# Patient Record
Sex: FEMALE | Race: WHITE | Employment: FULL TIME | URBAN - METROPOLITAN AREA
[De-identification: names, ages, dates, MRNs, and addresses within clinical notes are randomized per-mention and may not be internally consistent; named-entity substitution may affect disease eponyms.]

---

## 2017-02-20 PROBLEM — K58.0 IRRITABLE BOWEL SYNDROME WITH DIARRHEA: Status: ACTIVE | Noted: 2017-02-20

## 2018-04-24 ENCOUNTER — OFFICE VISIT (OUTPATIENT)
Dept: FAMILY MEDICINE CLINIC | Age: 43
End: 2018-04-24
Payer: COMMERCIAL

## 2018-04-24 VITALS
DIASTOLIC BLOOD PRESSURE: 80 MMHG | BODY MASS INDEX: 39.39 KG/M2 | HEIGHT: 67 IN | HEART RATE: 88 BPM | OXYGEN SATURATION: 94 % | WEIGHT: 251 LBS | RESPIRATION RATE: 18 BRPM | SYSTOLIC BLOOD PRESSURE: 122 MMHG

## 2018-04-24 DIAGNOSIS — H69.83 DYSFUNCTION OF BOTH EUSTACHIAN TUBES: ICD-10-CM

## 2018-04-24 DIAGNOSIS — J06.9 VIRAL URI: ICD-10-CM

## 2018-04-24 PROBLEM — H69.93 DYSFUNCTION OF BOTH EUSTACHIAN TUBES: Status: ACTIVE | Noted: 2018-04-24

## 2018-04-24 PROCEDURE — 99213 OFFICE O/P EST LOW 20 MIN: CPT | Performed by: FAMILY MEDICINE

## 2018-04-24 RX ORDER — PREDNISONE 20 MG/1
40 TABLET ORAL DAILY
Qty: 10 TABLET | Refills: 0 | Status: SHIPPED | OUTPATIENT
Start: 2018-04-24 | End: 2018-04-29

## 2018-04-24 ASSESSMENT — ENCOUNTER SYMPTOMS
DIARRHEA: 0
EYE ITCHING: 0
WHEEZING: 0
NAUSEA: 0
CHEST TIGHTNESS: 0
VOMITING: 0
CONSTIPATION: 0
EYE PAIN: 0
COLOR CHANGE: 0
APNEA: 0
BLOOD IN STOOL: 0
BACK PAIN: 0
ABDOMINAL PAIN: 0
SHORTNESS OF BREATH: 0
EYE REDNESS: 0
COUGH: 0
VISUAL CHANGE: 0
SORE THROAT: 0
SINUS PRESSURE: 0
RHINORRHEA: 0

## 2018-04-24 ASSESSMENT — PATIENT HEALTH QUESTIONNAIRE - PHQ9
SUM OF ALL RESPONSES TO PHQ9 QUESTIONS 1 & 2: 0
SUM OF ALL RESPONSES TO PHQ QUESTIONS 1-9: 0
1. LITTLE INTEREST OR PLEASURE IN DOING THINGS: 0
2. FEELING DOWN, DEPRESSED OR HOPELESS: 0

## 2018-06-01 ENCOUNTER — OFFICE VISIT (OUTPATIENT)
Dept: FAMILY MEDICINE CLINIC | Age: 43
End: 2018-06-01
Payer: COMMERCIAL

## 2018-06-01 ENCOUNTER — HOSPITAL ENCOUNTER (OUTPATIENT)
Age: 43
Discharge: HOME | End: 2018-06-03
Payer: COMMERCIAL

## 2018-06-01 VITALS
WEIGHT: 250 LBS | DIASTOLIC BLOOD PRESSURE: 70 MMHG | OXYGEN SATURATION: 97 % | HEIGHT: 67 IN | HEART RATE: 89 BPM | RESPIRATION RATE: 18 BRPM | SYSTOLIC BLOOD PRESSURE: 110 MMHG | BODY MASS INDEX: 39.24 KG/M2

## 2018-06-01 DIAGNOSIS — N93.8 DUB (DYSFUNCTIONAL UTERINE BLEEDING): ICD-10-CM

## 2018-06-01 DIAGNOSIS — E78.49 FAMILIAL HYPERLIPIDEMIA: Chronic | ICD-10-CM

## 2018-06-01 DIAGNOSIS — E11.9 TYPE 2 DIABETES MELLITUS WITHOUT COMPLICATION, WITHOUT LONG-TERM CURRENT USE OF INSULIN (HCC): ICD-10-CM

## 2018-06-01 DIAGNOSIS — H69.83 DYSFUNCTION OF BOTH EUSTACHIAN TUBES: ICD-10-CM

## 2018-06-01 DIAGNOSIS — E11.9 TYPE 2 DIABETES MELLITUS WITHOUT COMPLICATION, WITHOUT LONG-TERM CURRENT USE OF INSULIN (HCC): Primary | ICD-10-CM

## 2018-06-01 DIAGNOSIS — I10 ESSENTIAL HYPERTENSION: ICD-10-CM

## 2018-06-01 PROBLEM — J06.9 VIRAL URI: Status: RESOLVED | Noted: 2018-04-24 | Resolved: 2018-06-01

## 2018-06-01 LAB
ALBUMIN SERPL-MCNC: 4.2 G/DL (ref 3.5–5.2)
ALP BLD-CCNC: 76 U/L (ref 35–104)
ALT SERPL-CCNC: 47 U/L (ref 0–32)
ANION GAP SERPL CALCULATED.3IONS-SCNC: 11 MMOL/L (ref 7–16)
AST SERPL-CCNC: 25 U/L (ref 0–31)
BILIRUB SERPL-MCNC: 0.2 MG/DL (ref 0–1.2)
BUN BLDV-MCNC: 11 MG/DL (ref 6–20)
CALCIUM SERPL-MCNC: 9.1 MG/DL (ref 8.6–10.2)
CHLORIDE BLD-SCNC: 99 MMOL/L (ref 98–107)
CHOLESTEROL, TOTAL: 156 MG/DL (ref 0–199)
CO2: 28 MMOL/L (ref 22–29)
CREAT SERPL-MCNC: 0.7 MG/DL (ref 0.5–1)
CREATININE URINE: 144 MG/DL (ref 29–226)
FERRITIN: 19 NG/ML
FOLLICLE STIMULATING HORMONE: 6.2 MIU/ML
GFR AFRICAN AMERICAN: >60
GFR NON-AFRICAN AMERICAN: >60 ML/MIN/1.73
GLUCOSE BLD-MCNC: 167 MG/DL (ref 74–109)
HBA1C MFR BLD: 7.7 % (ref 4.8–5.9)
HCT VFR BLD CALC: 40.1 % (ref 34–48)
HDLC SERPL-MCNC: 38 MG/DL
HEMOGLOBIN: 12.3 G/DL (ref 11.5–15.5)
IRON SATURATION: 14 % (ref 15–50)
IRON: 51 MCG/DL (ref 37–145)
LDL CHOLESTEROL CALCULATED: 93 MG/DL (ref 0–99)
LUTEINIZING HORMONE: 5.1 MIU/ML
MCH RBC QN AUTO: 27.6 PG (ref 26–35)
MCHC RBC AUTO-ENTMCNC: 30.7 % (ref 32–34.5)
MCV RBC AUTO: 89.9 FL (ref 80–99.9)
MICROALBUMIN UR-MCNC: 32.1 MG/L
MICROALBUMIN/CREAT UR-RTO: 22.3 (ref 0–30)
PDW BLD-RTO: 14.1 FL (ref 11.5–15)
PLATELET # BLD: 266 E9/L (ref 130–450)
PMV BLD AUTO: 14.4 FL (ref 7–12)
POTASSIUM SERPL-SCNC: 4.7 MMOL/L (ref 3.5–5)
RBC # BLD: 4.46 E12/L (ref 3.5–5.5)
SODIUM BLD-SCNC: 138 MMOL/L (ref 132–146)
TOTAL IRON BINDING CAPACITY: 365 MCG/DL (ref 250–450)
TOTAL PROTEIN: 7.3 G/DL (ref 6.4–8.3)
TRIGL SERPL-MCNC: 127 MG/DL (ref 0–149)
TSH SERPL DL<=0.05 MIU/L-ACNC: 0.77 UIU/ML (ref 0.27–4.2)
VLDLC SERPL CALC-MCNC: 25 MG/DL
WBC # BLD: 6 E9/L (ref 4.5–11.5)

## 2018-06-01 PROCEDURE — 82570 ASSAY OF URINE CREATININE: CPT

## 2018-06-01 PROCEDURE — 80061 LIPID PANEL: CPT

## 2018-06-01 PROCEDURE — 83001 ASSAY OF GONADOTROPIN (FSH): CPT

## 2018-06-01 PROCEDURE — 83002 ASSAY OF GONADOTROPIN (LH): CPT

## 2018-06-01 PROCEDURE — 83550 IRON BINDING TEST: CPT

## 2018-06-01 PROCEDURE — 36415 COLL VENOUS BLD VENIPUNCTURE: CPT | Performed by: FAMILY MEDICINE

## 2018-06-01 PROCEDURE — 83540 ASSAY OF IRON: CPT

## 2018-06-01 PROCEDURE — 85027 COMPLETE CBC AUTOMATED: CPT

## 2018-06-01 PROCEDURE — 82728 ASSAY OF FERRITIN: CPT

## 2018-06-01 PROCEDURE — 83036 HEMOGLOBIN GLYCOSYLATED A1C: CPT

## 2018-06-01 PROCEDURE — 82044 UR ALBUMIN SEMIQUANTITATIVE: CPT

## 2018-06-01 PROCEDURE — 84443 ASSAY THYROID STIM HORMONE: CPT

## 2018-06-01 PROCEDURE — 80053 COMPREHEN METABOLIC PANEL: CPT

## 2018-06-01 RX ORDER — METFORMIN HYDROCHLORIDE 500 MG/1
500 TABLET, EXTENDED RELEASE ORAL
Qty: 30 TABLET | Refills: 3 | Status: SHIPPED | OUTPATIENT
Start: 2018-06-01 | End: 2018-06-04

## 2018-06-01 ASSESSMENT — ENCOUNTER SYMPTOMS
NAUSEA: 0
ABDOMINAL PAIN: 0
BLOOD IN STOOL: 0
EYE ITCHING: 0
APNEA: 0
COLOR CHANGE: 0
RHINORRHEA: 1
BACK PAIN: 0
EYE REDNESS: 0
COUGH: 0
SORE THROAT: 0
SINUS PRESSURE: 0
CONSTIPATION: 0
VOMITING: 0
CHEST TIGHTNESS: 0
SHORTNESS OF BREATH: 0
EYE PAIN: 0
WHEEZING: 0
DIARRHEA: 0

## 2018-06-04 ENCOUNTER — TELEPHONE (OUTPATIENT)
Dept: FAMILY MEDICINE CLINIC | Age: 43
End: 2018-06-04

## 2018-06-04 RX ORDER — METFORMIN HYDROCHLORIDE 500 MG/1
1000 TABLET, EXTENDED RELEASE ORAL
Qty: 60 TABLET | Refills: 5 | Status: SHIPPED | OUTPATIENT
Start: 2018-06-04 | End: 2018-06-18 | Stop reason: SDUPTHER

## 2018-06-04 RX ORDER — ROSUVASTATIN CALCIUM 5 MG/1
5 TABLET, COATED ORAL NIGHTLY
Qty: 30 TABLET | Refills: 5 | Status: SHIPPED | OUTPATIENT
Start: 2018-06-04 | End: 2018-08-29 | Stop reason: SDUPTHER

## 2018-06-18 RX ORDER — METFORMIN HYDROCHLORIDE 500 MG/1
1000 TABLET, EXTENDED RELEASE ORAL
Qty: 60 TABLET | Refills: 5 | Status: SHIPPED | OUTPATIENT
Start: 2018-06-18 | End: 2018-06-20 | Stop reason: DRUGHIGH

## 2018-06-20 RX ORDER — METFORMIN HYDROCHLORIDE 500 MG/1
1000 TABLET, EXTENDED RELEASE ORAL
COMMUNITY
End: 2018-06-20 | Stop reason: SDUPTHER

## 2018-06-20 RX ORDER — METFORMIN HYDROCHLORIDE 500 MG/1
1000 TABLET, EXTENDED RELEASE ORAL
Qty: 60 TABLET | Refills: 5 | Status: SHIPPED | OUTPATIENT
Start: 2018-06-20 | End: 2018-08-13 | Stop reason: SDUPTHER

## 2018-06-25 ENCOUNTER — HOSPITAL ENCOUNTER (OUTPATIENT)
Age: 43
Discharge: HOME | End: 2018-06-27
Payer: COMMERCIAL

## 2018-06-25 PROCEDURE — 88305 TISSUE EXAM BY PATHOLOGIST: CPT

## 2018-07-03 RX ORDER — VENLAFAXINE HYDROCHLORIDE 150 MG/1
150 CAPSULE, EXTENDED RELEASE ORAL DAILY
Qty: 90 CAPSULE | Refills: 3 | Status: SHIPPED | OUTPATIENT
Start: 2018-07-03 | End: 2018-11-09 | Stop reason: SDUPTHER

## 2018-08-13 RX ORDER — METFORMIN HYDROCHLORIDE 500 MG/1
1000 TABLET, EXTENDED RELEASE ORAL
Qty: 180 TABLET | Refills: 2 | Status: SHIPPED | OUTPATIENT
Start: 2018-08-13 | End: 2019-04-14 | Stop reason: SDUPTHER

## 2018-08-29 RX ORDER — ROSUVASTATIN CALCIUM 5 MG/1
5 TABLET, COATED ORAL NIGHTLY
Qty: 90 TABLET | Refills: 3 | Status: SHIPPED | OUTPATIENT
Start: 2018-08-29 | End: 2019-07-17 | Stop reason: SDUPTHER

## 2018-09-05 RX ORDER — GLIMEPIRIDE 2 MG/1
2 TABLET ORAL EVERY MORNING
Qty: 90 TABLET | Refills: 3 | Status: SHIPPED | OUTPATIENT
Start: 2018-09-05 | End: 2018-09-06 | Stop reason: SDUPTHER

## 2018-09-06 RX ORDER — GLIMEPIRIDE 2 MG/1
2 TABLET ORAL EVERY MORNING
Qty: 90 TABLET | Refills: 3 | Status: SHIPPED | OUTPATIENT
Start: 2018-09-06 | End: 2018-11-09 | Stop reason: SDUPTHER

## 2018-09-13 ENCOUNTER — HOSPITAL ENCOUNTER (OUTPATIENT)
Age: 43
Discharge: HOME | End: 2018-09-15
Payer: COMMERCIAL

## 2018-09-13 PROCEDURE — 88305 TISSUE EXAM BY PATHOLOGIST: CPT

## 2018-10-24 ENCOUNTER — HOSPITAL ENCOUNTER (OUTPATIENT)
Age: 43
Discharge: HOME | End: 2018-10-26

## 2018-10-24 PROCEDURE — 88305 TISSUE EXAM BY PATHOLOGIST: CPT

## 2018-11-09 ENCOUNTER — OFFICE VISIT (OUTPATIENT)
Dept: FAMILY MEDICINE CLINIC | Age: 43
End: 2018-11-09
Payer: COMMERCIAL

## 2018-11-09 VITALS
BODY MASS INDEX: 36.1 KG/M2 | WEIGHT: 230 LBS | HEIGHT: 67 IN | SYSTOLIC BLOOD PRESSURE: 130 MMHG | DIASTOLIC BLOOD PRESSURE: 78 MMHG | HEART RATE: 92 BPM | OXYGEN SATURATION: 98 %

## 2018-11-09 DIAGNOSIS — I10 ESSENTIAL HYPERTENSION: Primary | ICD-10-CM

## 2018-11-09 DIAGNOSIS — E11.9 TYPE 2 DIABETES MELLITUS WITHOUT COMPLICATION, WITHOUT LONG-TERM CURRENT USE OF INSULIN (HCC): ICD-10-CM

## 2018-11-09 LAB
DIABETIC RETINOPATHY: NEGATIVE
HBA1C MFR BLD: 7.8 %

## 2018-11-09 PROCEDURE — 99214 OFFICE O/P EST MOD 30 MIN: CPT | Performed by: FAMILY MEDICINE

## 2018-11-09 PROCEDURE — 83036 HEMOGLOBIN GLYCOSYLATED A1C: CPT | Performed by: FAMILY MEDICINE

## 2018-11-09 RX ORDER — VENLAFAXINE HYDROCHLORIDE 150 MG/1
150 CAPSULE, EXTENDED RELEASE ORAL DAILY
Qty: 90 CAPSULE | Refills: 3 | Status: SHIPPED | OUTPATIENT
Start: 2018-11-09 | End: 2020-02-03 | Stop reason: SDUPTHER

## 2018-11-09 RX ORDER — GLIMEPIRIDE 4 MG/1
4 TABLET ORAL EVERY MORNING
Qty: 90 TABLET | Refills: 3 | Status: SHIPPED | OUTPATIENT
Start: 2018-11-09 | End: 2019-04-15 | Stop reason: SDUPTHER

## 2018-11-09 RX ORDER — LIRAGLUTIDE 6 MG/ML
1.8 INJECTION SUBCUTANEOUS DAILY
Qty: 27 ML | Refills: 3 | Status: SHIPPED | OUTPATIENT
Start: 2018-11-09 | End: 2019-04-15 | Stop reason: SDUPTHER

## 2018-11-09 ASSESSMENT — ENCOUNTER SYMPTOMS
DIARRHEA: 0
COLOR CHANGE: 0
EYE ITCHING: 0
CHEST TIGHTNESS: 0
SINUS PRESSURE: 0
SHORTNESS OF BREATH: 0
BACK PAIN: 0
EYE PAIN: 0
VOMITING: 0
BLOOD IN STOOL: 0
APNEA: 0
WHEEZING: 0
COUGH: 0
NAUSEA: 0
CONSTIPATION: 0
SORE THROAT: 0
ABDOMINAL PAIN: 0
EYE REDNESS: 0
RHINORRHEA: 1

## 2018-11-09 ASSESSMENT — PATIENT HEALTH QUESTIONNAIRE - PHQ9
SUM OF ALL RESPONSES TO PHQ QUESTIONS 1-9: 0
2. FEELING DOWN, DEPRESSED OR HOPELESS: 0
SUM OF ALL RESPONSES TO PHQ9 QUESTIONS 1 & 2: 0
1. LITTLE INTEREST OR PLEASURE IN DOING THINGS: 0
SUM OF ALL RESPONSES TO PHQ QUESTIONS 1-9: 0

## 2018-11-09 NOTE — PROGRESS NOTES
Negative for activity change, appetite change, fatigue and fever. HENT: Positive for hearing loss and rhinorrhea. Negative for congestion, ear pain, nosebleeds, sinus pressure and sore throat. Eyes: Negative for pain, redness, itching and visual disturbance. Respiratory: Negative for apnea, cough, chest tightness, shortness of breath and wheezing. Cardiovascular: Negative for chest pain, palpitations and leg swelling. Gastrointestinal: Negative for abdominal pain, blood in stool, constipation, diarrhea, nausea and vomiting. Endocrine: Negative. Genitourinary: Negative for decreased urine volume, difficulty urinating, dysuria, frequency, hematuria and urgency. Musculoskeletal: Negative for arthralgias, back pain, gait problem, myalgias and neck pain. Skin: Negative for color change and rash. Allergic/Immunologic: Negative for environmental allergies and food allergies. Neurological: Negative for dizziness, weakness, light-headedness, numbness and headaches. Hematological: Negative for adenopathy. Does not bruise/bleed easily. Psychiatric/Behavioral: Negative for behavioral problems, dysphoric mood and sleep disturbance. The patient is not nervous/anxious and is not hyperactive. All other systems reviewed and are negative. /78 (Site: Right Upper Arm, Position: Sitting)   Pulse 92   Ht 5' 7\" (1.702 m)   Wt 230 lb (104.3 kg)   SpO2 98%   BMI 36.02 kg/m²     Physical Exam   Constitutional: She is oriented to person, place, and time. She appears well-developed and well-nourished. HENT:   Head: Normocephalic and atraumatic. Right Ear: External ear normal.   Left Ear: External ear normal.   Nose: Nose normal.   Mouth/Throat: Oropharynx is clear and moist.   Eyes: Pupils are equal, round, and reactive to light. Conjunctivae and EOM are normal. No scleral icterus. Neck: Normal range of motion. Neck supple. No thyromegaly present.    Cardiovascular: Normal rate, regular

## 2018-11-13 RX ORDER — LISINOPRIL 20 MG/1
20 TABLET ORAL DAILY
Qty: 90 TABLET | Refills: 3 | Status: SHIPPED | OUTPATIENT
Start: 2018-11-13 | End: 2019-07-17 | Stop reason: SDUPTHER

## 2018-12-19 RX ORDER — DEXLANSOPRAZOLE 60 MG/1
60 CAPSULE, DELAYED RELEASE ORAL DAILY
Qty: 90 CAPSULE | Refills: 3 | Status: SHIPPED | OUTPATIENT
Start: 2018-12-19 | End: 2019-01-17 | Stop reason: SDUPTHER

## 2018-12-19 RX ORDER — DEXLANSOPRAZOLE 60 MG/1
60 CAPSULE, DELAYED RELEASE ORAL DAILY
Qty: 10 CAPSULE | Refills: 0 | Status: SHIPPED | OUTPATIENT
Start: 2018-12-19 | End: 2019-07-17 | Stop reason: SDUPTHER

## 2019-01-15 ENCOUNTER — PATIENT MESSAGE (OUTPATIENT)
Dept: FAMILY MEDICINE CLINIC | Age: 44
End: 2019-01-15

## 2019-01-17 ENCOUNTER — OFFICE VISIT (OUTPATIENT)
Dept: FAMILY MEDICINE CLINIC | Age: 44
End: 2019-01-17
Payer: COMMERCIAL

## 2019-01-17 ENCOUNTER — HOSPITAL ENCOUNTER (OUTPATIENT)
Age: 44
Discharge: HOME | End: 2019-01-19
Payer: COMMERCIAL

## 2019-01-17 VITALS
DIASTOLIC BLOOD PRESSURE: 88 MMHG | HEIGHT: 67 IN | SYSTOLIC BLOOD PRESSURE: 128 MMHG | RESPIRATION RATE: 16 BRPM | OXYGEN SATURATION: 98 % | BODY MASS INDEX: 38.3 KG/M2 | WEIGHT: 244 LBS | HEART RATE: 102 BPM

## 2019-01-17 DIAGNOSIS — E11.9 TYPE 2 DIABETES MELLITUS WITHOUT COMPLICATION, WITHOUT LONG-TERM CURRENT USE OF INSULIN (HCC): Primary | ICD-10-CM

## 2019-01-17 DIAGNOSIS — E11.9 TYPE 2 DIABETES MELLITUS WITHOUT COMPLICATION, WITHOUT LONG-TERM CURRENT USE OF INSULIN (HCC): ICD-10-CM

## 2019-01-17 DIAGNOSIS — J06.9 VIRAL URI: ICD-10-CM

## 2019-01-17 LAB
ALBUMIN SERPL-MCNC: 4.3 G/DL (ref 3.5–5.2)
ALP BLD-CCNC: 80 U/L (ref 35–104)
ALT SERPL-CCNC: 35 U/L (ref 0–32)
ANION GAP SERPL CALCULATED.3IONS-SCNC: 17 MMOL/L (ref 7–16)
AST SERPL-CCNC: 23 U/L (ref 0–31)
BILIRUB SERPL-MCNC: 0.3 MG/DL (ref 0–1.2)
BUN BLDV-MCNC: 12 MG/DL (ref 6–20)
CALCIUM SERPL-MCNC: 9.3 MG/DL (ref 8.6–10.2)
CHLORIDE BLD-SCNC: 99 MMOL/L (ref 98–107)
CHOLESTEROL, TOTAL: 138 MG/DL (ref 0–199)
CO2: 24 MMOL/L (ref 22–29)
CREAT SERPL-MCNC: 0.7 MG/DL (ref 0.5–1)
GFR AFRICAN AMERICAN: >60
GFR NON-AFRICAN AMERICAN: >60 ML/MIN/1.73
GLUCOSE BLD-MCNC: 220 MG/DL (ref 74–99)
HBA1C MFR BLD: 8.7 % (ref 4–5.6)
HCT VFR BLD CALC: 40 % (ref 34–48)
HDLC SERPL-MCNC: 36 MG/DL
HEMOGLOBIN: 12.3 G/DL (ref 11.5–15.5)
LDL CHOLESTEROL CALCULATED: 62 MG/DL (ref 0–99)
MCH RBC QN AUTO: 26.3 PG (ref 26–35)
MCHC RBC AUTO-ENTMCNC: 30.8 % (ref 32–34.5)
MCV RBC AUTO: 85.7 FL (ref 80–99.9)
PDW BLD-RTO: 15.3 FL (ref 11.5–15)
PLATELET # BLD: 295 E9/L (ref 130–450)
PMV BLD AUTO: 15 FL (ref 7–12)
POTASSIUM SERPL-SCNC: 4.3 MMOL/L (ref 3.5–5)
RBC # BLD: 4.67 E12/L (ref 3.5–5.5)
SODIUM BLD-SCNC: 140 MMOL/L (ref 132–146)
TOTAL PROTEIN: 7.8 G/DL (ref 6.4–8.3)
TRIGL SERPL-MCNC: 199 MG/DL (ref 0–149)
TSH SERPL DL<=0.05 MIU/L-ACNC: 0.71 UIU/ML (ref 0.27–4.2)
VLDLC SERPL CALC-MCNC: 40 MG/DL
WBC # BLD: 7.7 E9/L (ref 4.5–11.5)

## 2019-01-17 PROCEDURE — 80061 LIPID PANEL: CPT

## 2019-01-17 PROCEDURE — 99214 OFFICE O/P EST MOD 30 MIN: CPT | Performed by: FAMILY MEDICINE

## 2019-01-17 PROCEDURE — 84443 ASSAY THYROID STIM HORMONE: CPT

## 2019-01-17 PROCEDURE — 85027 COMPLETE CBC AUTOMATED: CPT

## 2019-01-17 PROCEDURE — 80053 COMPREHEN METABOLIC PANEL: CPT

## 2019-01-17 PROCEDURE — 83036 HEMOGLOBIN GLYCOSYLATED A1C: CPT

## 2019-01-17 RX ORDER — CETIRIZINE HYDROCHLORIDE 10 MG/1
10 TABLET ORAL DAILY
Qty: 30 TABLET | Refills: 0 | Status: SHIPPED | OUTPATIENT
Start: 2019-01-17 | End: 2019-02-14 | Stop reason: SDUPTHER

## 2019-01-17 RX ORDER — BENZONATATE 200 MG/1
200 CAPSULE ORAL 3 TIMES DAILY PRN
Qty: 30 CAPSULE | Refills: 0 | Status: SHIPPED | OUTPATIENT
Start: 2019-01-17 | End: 2019-02-20 | Stop reason: SDUPTHER

## 2019-01-17 ASSESSMENT — ENCOUNTER SYMPTOMS
SORE THROAT: 0
COUGH: 1
SINUS PRESSURE: 0
CONSTIPATION: 0
VOMITING: 0
CHEST TIGHTNESS: 0
APNEA: 0
WHEEZING: 0
COLOR CHANGE: 0
BACK PAIN: 0
BLOOD IN STOOL: 0
RHINORRHEA: 0
NAUSEA: 0
DIARRHEA: 0
EYE PAIN: 0
EYE ITCHING: 0
ABDOMINAL PAIN: 0
SHORTNESS OF BREATH: 0
EYE REDNESS: 0

## 2019-01-17 ASSESSMENT — PATIENT HEALTH QUESTIONNAIRE - PHQ9
SUM OF ALL RESPONSES TO PHQ9 QUESTIONS 1 & 2: 0
2. FEELING DOWN, DEPRESSED OR HOPELESS: 0
SUM OF ALL RESPONSES TO PHQ QUESTIONS 1-9: 0
1. LITTLE INTEREST OR PLEASURE IN DOING THINGS: 0
SUM OF ALL RESPONSES TO PHQ QUESTIONS 1-9: 0

## 2019-01-21 ENCOUNTER — TELEPHONE (OUTPATIENT)
Dept: FAMILY MEDICINE CLINIC | Age: 44
End: 2019-01-21

## 2019-01-21 DIAGNOSIS — E11.9 TYPE 2 DIABETES MELLITUS WITHOUT COMPLICATION, WITHOUT LONG-TERM CURRENT USE OF INSULIN (HCC): Primary | ICD-10-CM

## 2019-02-14 RX ORDER — CETIRIZINE HYDROCHLORIDE 10 MG/1
TABLET ORAL
Qty: 30 TABLET | Refills: 5 | Status: SHIPPED | OUTPATIENT
Start: 2019-02-14 | End: 2019-03-20 | Stop reason: SDUPTHER

## 2019-03-04 ENCOUNTER — OFFICE VISIT (OUTPATIENT)
Dept: ENDOCRINOLOGY | Age: 44
End: 2019-03-04
Payer: COMMERCIAL

## 2019-03-04 VITALS
DIASTOLIC BLOOD PRESSURE: 82 MMHG | SYSTOLIC BLOOD PRESSURE: 122 MMHG | BODY MASS INDEX: 38.77 KG/M2 | OXYGEN SATURATION: 99 % | HEIGHT: 67 IN | WEIGHT: 247 LBS | HEART RATE: 72 BPM | RESPIRATION RATE: 18 BRPM

## 2019-03-04 DIAGNOSIS — E66.9 OBESITY WITHOUT SERIOUS COMORBIDITY, UNSPECIFIED CLASSIFICATION, UNSPECIFIED OBESITY TYPE: ICD-10-CM

## 2019-03-04 DIAGNOSIS — E55.9 VITAMIN D DEFICIENCY: ICD-10-CM

## 2019-03-04 DIAGNOSIS — E11.9 TYPE 2 DIABETES MELLITUS WITHOUT COMPLICATION, WITHOUT LONG-TERM CURRENT USE OF INSULIN (HCC): Primary | ICD-10-CM

## 2019-03-04 PROCEDURE — 99204 OFFICE O/P NEW MOD 45 MIN: CPT | Performed by: INTERNAL MEDICINE

## 2019-03-13 ENCOUNTER — TELEPHONE (OUTPATIENT)
Dept: ENDOCRINOLOGY | Age: 44
End: 2019-03-13

## 2019-03-20 RX ORDER — CETIRIZINE HYDROCHLORIDE 10 MG/1
TABLET ORAL
Qty: 90 TABLET | Refills: 1 | Status: SHIPPED | OUTPATIENT
Start: 2019-03-20 | End: 2019-09-09

## 2019-03-28 ENCOUNTER — TELEPHONE (OUTPATIENT)
Dept: ENDOCRINOLOGY | Age: 44
End: 2019-03-28

## 2019-03-29 NOTE — TELEPHONE ENCOUNTER
Stay on current diabetes regimen until you finish diabetes class    Please send us sugar a week or two after finishing education class

## 2019-04-15 RX ORDER — METFORMIN HYDROCHLORIDE 500 MG/1
TABLET, EXTENDED RELEASE ORAL
Qty: 180 TABLET | Refills: 2 | Status: SHIPPED | OUTPATIENT
Start: 2019-04-15 | End: 2019-04-25

## 2019-04-15 RX ORDER — METFORMIN HYDROCHLORIDE 500 MG/1
TABLET, EXTENDED RELEASE ORAL
Qty: 180 TABLET | Refills: 2 | Status: CANCELLED | OUTPATIENT
Start: 2019-04-15

## 2019-04-15 RX ORDER — LIRAGLUTIDE 6 MG/ML
1.8 INJECTION SUBCUTANEOUS DAILY
Qty: 27 ML | Refills: 3 | Status: SHIPPED | OUTPATIENT
Start: 2019-04-15 | End: 2019-10-28 | Stop reason: SDUPTHER

## 2019-04-15 RX ORDER — GLIMEPIRIDE 4 MG/1
4 TABLET ORAL 2 TIMES DAILY
Qty: 120 TABLET | Refills: 5 | Status: SHIPPED | OUTPATIENT
Start: 2019-04-15 | End: 2019-07-18 | Stop reason: SDUPTHER

## 2019-04-23 ENCOUNTER — HOSPITAL ENCOUNTER (OUTPATIENT)
Dept: DIABETES SERVICES | Age: 44
Setting detail: THERAPIES SERIES
Discharge: HOME | End: 2019-04-23
Payer: COMMERCIAL

## 2019-04-23 PROCEDURE — G0109 DIAB MANAGE TRN IND/GROUP: HCPCS | Performed by: DIETITIAN, REGISTERED

## 2019-04-23 SDOH — ECONOMIC STABILITY: FOOD INSECURITY: ADDITIONAL INFORMATION: NO

## 2019-04-24 ENCOUNTER — HOSPITAL ENCOUNTER (OUTPATIENT)
Dept: DIABETES SERVICES | Age: 44
Setting detail: THERAPIES SERIES
Discharge: HOME | End: 2019-04-24
Payer: COMMERCIAL

## 2019-04-24 PROCEDURE — G0109 DIAB MANAGE TRN IND/GROUP: HCPCS

## 2019-04-24 NOTE — PROGRESS NOTES
Diabetes Self-Management Education Record    Participant Name: Friddie Gitelman  Referring Provider: Marcus Vazquez DO  Assessment/Evaluation Ratings:  1=Needs Instruction   4=Demonstrates Understanding/Competency  2=Needs Review   NC=Not Covered    3=Comprehends Key Points  N/A=Not Applicable  Topics/Learning Objectives Pre-session Assess Date:  4/23/19 PC Instr. Date Reinforce Date Post- session Eval Comments   Diabetes disease process & Treatment process: Define diabetes & prediabetes; Identify own type of diabetes; role of the pancreas; signs/symptoms; diagnostic criteria; prevention & treatment options; contributing factors. 1 4/23/19 PC  3 Hx of GDM      Type 2 DM since 2008    Family hx   Incorporating nutritional management into lifestyle: Describe effect of type, amount & timing of food on blood glucose; Describe basic meal planning techniques & current nutrition guidelines;Correctly read food labels & demonstrate CHO counting & portion control with personalized meal plan. Identify dining out strategies, & dietary sick day guidelines. Incorporating physical activity into lifestyle:   Verbalize effect of exercise on blood glucose levels; benefits of regular exercise; safety considerations; contraindications; maintenance of activity. 1 4/23/19 PC  3 Yoga 3  Days a week     30-60 minutes each sesssion   Using medications safely:  Identify effects of diabetes medicines on blood glucose levels; List diabetes medication taken, action & side effects; appropriate injection sites; proper storage; supplies needed; proper technique; safe needle disposal guidelines. 1 4/23/19 PC 4/24/19 PC  3 Metformin1,000 mg with breakfast and supper    Glimepiride 4mg breakfast and supper    Victoza 1.8mg daily   Monitoring blood glucose, interpreting and using results:  Identify recommended & personal blood glucose targets; importance of testing; testing supplies; HgbA1C target levels;  Factors affecting blood glucose; Importance of logging blood glucose levels for pattern recognition; ketone testing; safe lancet disposal. 1 4/23/19 PC  3 Testing once daily before breakfast    Ac 136-188   Prevention, detection & treatment of acute complications:  Identify symptoms of hyper & hypoglycemia, and prevention & treatment strategies. Describe sick day guidelines & indications for ketone testing & physician notification. Identify short term consequences of poor control. 1 4/23/19 PC  3 Rare low sugar gets shaky and sweaty    Rx with glucose tabs or fruit     Prevention, detection & treatment of chronic complications:  Define the natural course of diabetes & describe the relationship of blood glucose levels to long term complications of diabetes. Identify preventative measures & standards of care. 1 4/23/19 PC  3 Hypertension    Depression    Liver disease    migraines   Developing strategies to address psychosocial issues:  Describe feelings about living with diabetes; Describe how stress, depression & anxiety affect blood glucose; Identify coping strategies; Identify support needed & support network available. 1 4/23/19 PC  3 PHQ-9 Depression Screen Score: 9     Developing strategies to promote health/change behavior: Identify 7 self-care behaviors; Personal health risk factors; Benefits, challenges & strategies for behavioral change; Individualized goal selection.  1    Goal:     Identified Barriers to learning/adherence to self management plan:    None    Instruction Method:  Lecture/Discussion, Power Point Presentation  and Handouts    Education Materials/Equipment Provided: Self-management manual, Meal Plan and Nutritional Packet       Encounter Type Date Attended Start Time End Time Comments No Show Dates   Assessment 4/23/19 PC 1800 1830   In person    Session 1 4/23/19 PC 1830 2100      Session 2        Session 3         Session 4         Gestational Diabetes         Individual MNT        Meter Instrx        Insulin Instrx Additional Comments:    DSMES Support Plan:  Follow-up plan/Date: 7/2019  Contact Post Class Regarding:   Fasting Blood Sugar   HgbA1C   Weight   Hypertension/Follow-up with Physician   Self-Foot Exam Frequency   Monitoring Frequency   Exercise Routine   Goal Attainment  Post Education Referrals:      []WIC   []PAP   []Wound Care   []Social Service   [] Marko Almanza [facility] Specialist   [][facility]   []Sleep Lab   []Other

## 2019-04-24 NOTE — PROGRESS NOTES
Diabetes Self-Management Education Record    Participant Name: Rylee Poole  Referring Provider: Kathy Lim DO  Assessment/Evaluation Ratings:  1=Needs Instruction   4=Demonstrates Understanding/Competency  2=Needs Review   NC=Not Covered    3=Comprehends Key Points  N/A=Not Applicable  Topics/Learning Objectives Pre-session Assess Date:  4/23/19 PC Instr. Date Reinforce Date Post- session Eval Comments   Diabetes disease process & Treatment process: Define diabetes & prediabetes; Identify own type of diabetes; role of the pancreas; signs/symptoms; diagnostic criteria; prevention & treatment options; contributing factors. 1 4/23/19 PC  3 Hx of GDM      Type 2 DM since 2008    Family hx   Incorporating nutritional management into lifestyle: Describe effect of type, amount & timing of food on blood glucose; Describe basic meal planning techniques & current nutrition guidelines;Correctly read food labels & demonstrate CHO counting & portion control with personalized meal plan. Identify dining out strategies, & dietary sick day guidelines. Incorporating physical activity into lifestyle:   Verbalize effect of exercise on blood glucose levels; benefits of regular exercise; safety considerations; contraindications; maintenance of activity. 1 4/23/19 PC  3 Yoga 3  Days a week     30-60 minutes each sesssion   Using medications safely:  Identify effects of diabetes medicines on blood glucose levels; List diabetes medication taken, action & side effects; appropriate injection sites; proper storage; supplies needed; proper technique; safe needle disposal guidelines. 1 4/23/19 PC 4/24/19 PC  3 Metformin1,000 mg with breakfast and supper    Glimepiride 4mg breakfast and supper    Victoza 1.8mg daily   Monitoring blood glucose, interpreting and using results:  Identify recommended & personal blood glucose targets; importance of testing; testing supplies; HgbA1C target levels;  Factors affecting blood glucose; Insulin Instrx            Additional Comments:    DSMES Support Plan:  Follow-up plan/Date: 7/2019  Contact Post Class Regarding:   Fasting Blood Sugar   HgbA1C   Weight   Hypertension/Follow-up with Physician   Self-Foot Exam Frequency   Monitoring Frequency   Exercise Routine   Goal Attainment  Post Education Referrals:      []WIC   []PAP   []Wound Care   []Social Service   []BRITTANI Almanza [address] Specialist   [][facility]   []Sleep Lab   []Other

## 2019-04-25 ENCOUNTER — HOSPITAL ENCOUNTER (OUTPATIENT)
Dept: DIABETES SERVICES | Age: 44
Setting detail: THERAPIES SERIES
Discharge: HOME | End: 2019-04-25
Payer: COMMERCIAL

## 2019-04-25 PROCEDURE — 97804 MEDICAL NUTRITION GROUP: CPT

## 2019-04-25 NOTE — PROGRESS NOTES
Diabetes Self-Management Education Record    Participant Name: Randy Avila  Referring Provider: Yelitza Lucas DO  Assessment/Evaluation Ratings:  1=Needs Instruction   4=Demonstrates Understanding/Competency  2=Needs Review   NC=Not Covered    3=Comprehends Key Points  N/A=Not Applicable  Topics/Learning Objectives Pre-session Assess Date:  4/23/19 PC Instr. Date Reinforce Date Post- session Eval Comments   Diabetes disease process & Treatment process: Define diabetes & prediabetes; Identify own type of diabetes; role of the pancreas; signs/symptoms; diagnostic criteria; prevention & treatment options; contributing factors. 1 4/23/19 PC  3 Hx of GDM      Type 2 DM since 2008    Family hx   Incorporating nutritional management into lifestyle: Describe effect of type, amount & timing of food on blood glucose; Describe basic meal planning techniques & current nutrition guidelines;Correctly read food labels & demonstrate CHO counting & portion control with personalized meal plan. Identify dining out strategies, & dietary sick day guidelines. 1 4/24/19 REM  4 Pt attended Session 2. Participated in group activities on Diabetes Plate Method and healthy  food choices. Demonstrated understanding of carb counting using food lists with carbohydrate serving sizes. Read CHO content of food correctly on nutrition facts using various food labels . Questions answered. Followed along and took notes. Incorporating physical activity into lifestyle:   Verbalize effect of exercise on blood glucose levels; benefits of regular exercise; safety considerations; contraindications; maintenance of activity. 1 4/23/19 PC  3 Yoga 3  Days a week     30-60 minutes each sesssion   Using medications safely:  Identify effects of diabetes medicines on blood glucose levels; List diabetes medication taken, action & side effects; appropriate injection sites; proper storage; supplies needed; proper technique; safe needle disposal guidelines.  1 4/23/19 PC 4/24/19 PC  3 Metformin1,000 mg with breakfast and supper    Glimepiride 4mg breakfast and supper    Victoza 1.8mg daily   Monitoring blood glucose, interpreting and using results:  Identify recommended & personal blood glucose targets; importance of testing; testing supplies; HgbA1C target levels; Factors affecting blood glucose; Importance of logging blood glucose levels for pattern recognition; ketone testing; safe lancet disposal. 1 4/23/19 PC  3 Testing once daily before breakfast    Ac 136-188    A1C 8.7%   Prevention, detection & treatment of acute complications:  Identify symptoms of hyper & hypoglycemia, and prevention & treatment strategies. Describe sick day guidelines & indications for ketone testing & physician notification. Identify short term consequences of poor control. 1 4/23/19 PC  3 Rare low sugar gets shaky and sweaty    Rx with glucose tabs or fruit     Prevention, detection & treatment of chronic complications:  Define the natural course of diabetes & describe the relationship of blood glucose levels to long term complications of diabetes. Identify preventative measures & standards of care. 1 4/23/19 PC  3 Hypertension    Depression    Liver disease    migraines   Developing strategies to address psychosocial issues:  Describe feelings about living with diabetes; Describe how stress, depression & anxiety affect blood glucose; Identify coping strategies; Identify support needed & support network available. 1 4/23/19 PC  3 PHQ-9 Depression Screen Score: 9     Developing strategies to promote health/change behavior: Identify 7 self-care behaviors; Personal health risk factors; Benefits, challenges & strategies for behavioral change; Individualized goal selection. 1    Goal: Add exercise in the morning.       Identified Barriers to learning/adherence to self management plan:    None    Instruction Method:  Lecture/Discussion, Power Point Presentation  and [facility] Materials/Equipment Provided: Self-management manual, Meal Plan and Nutritional Packet       Encounter Type Date Attended Start Time End Time Comments No Show Dates   Assessment 4/23/19 PC 1800 1830   In person    Session 1 4/23/19 PC 1830 2100      Session 2 4/24/19 PC  4/24/19 REM 1800  1830 1830  2030     Session 3         Session 4         Gestational Diabetes         Individual MNT        Meter Instrx        Insulin Instrx            Additional Comments:    DSMES Support Plan:  Follow-up plan/Date: 7/2019  Contact Post Class Regarding:   Fasting Blood Sugar   HgbA1C   Weight   Hypertension/Follow-up with Physician   Self-Foot Exam Frequency   Monitoring Frequency   Exercise Routine   Goal Attainment  Post Education Referrals:      []WIC   []PAP   []Wound Care   []Social Service   [] Marko [facility] Specialist   [][facility]   []Sleep Lab   []Other

## 2019-04-26 NOTE — PROGRESS NOTES
Diabetes Self-Management Education Record    Participant Name: Phoenix Palomino  Referring Provider: Marco A Mcguire DO  Assessment/Evaluation Ratings:  1=Needs Instruction   4=Demonstrates Understanding/Competency  2=Needs Review   NC=Not Covered    3=Comprehends Key Points  N/A=Not Applicable  Topics/Learning Objectives Pre-session Assess Date:  4/23/19 PC Instr. Date Reinforce Date Post- session Eval Comments   Diabetes disease process & Treatment process: Define diabetes & prediabetes; Identify own type of diabetes; role of the pancreas; signs/symptoms; diagnostic criteria; prevention & treatment options; contributing factors. 1 4/23/19 PC  3 Hx of GDM      Type 2 DM since 2008    Family hx   Incorporating nutritional management into lifestyle: Describe effect of type, amount & timing of food on blood glucose; Describe basic meal planning techniques & current nutrition guidelines;Correctly read food labels & demonstrate CHO counting & portion control with personalized meal plan. Identify dining out strategies, & dietary sick day guidelines. 1                                1 4/24/19 REM                                4/25/19 REM  4                                4 Pt attended Session 2. Participated in group activities on Diabetes Plate Method and healthy  food choices. Demonstrated understanding of carb counting using food lists with carbohydrate serving sizes. Read CHO content of food correctly on nutrition facts using various food labels . Questions answered. Followed along and took notes. Pt participated in group activities for meal planning. Instruction provided on a 1500 calorie carb-consistent meal plan. Pt was able to choose correct amount of carbohydrate at meals using food models and Meal Planning Food Lists sheet Questions answered. Followed along and took notes.     Incorporating physical activity into lifestyle:   Verbalize effect of exercise on blood glucose levels; benefits of regular exercise; safety considerations; contraindications; maintenance of activity. 1 4/23/19 PC  3 Yoga 3  Days a week     30-60 minutes each sesssion   Using medications safely:  Identify effects of diabetes medicines on blood glucose levels; List diabetes medication taken, action & side effects; appropriate injection sites; proper storage; supplies needed; proper technique; safe needle disposal guidelines. 1 4/23/19 PC 4/24/19 PC  3 Metformin1,000 mg with breakfast and supper    Glimepiride 4mg breakfast and supper    Victoza 1.8mg daily   Monitoring blood glucose, interpreting and using results:  Identify recommended & personal blood glucose targets; importance of testing; testing supplies; HgbA1C target levels; Factors affecting blood glucose; Importance of logging blood glucose levels for pattern recognition; ketone testing; safe lancet disposal. 1 4/23/19 PC  3 Testing once daily before breakfast    Ac 136-188    A1C 8.7%   Prevention, detection & treatment of acute complications:  Identify symptoms of hyper & hypoglycemia, and prevention & treatment strategies. Describe sick day guidelines & indications for ketone testing & physician notification. Identify short term consequences of poor control. 1 4/23/19 PC  3 Rare low sugar gets shaky and sweaty    Rx with glucose tabs or fruit     Prevention, detection & treatment of chronic complications:  Define the natural course of diabetes & describe the relationship of blood glucose levels to long term complications of diabetes. Identify preventative measures & standards of care. 1 4/23/19 PC  3 Hypertension    Depression    Liver disease    migraines   Developing strategies to address psychosocial issues:  Describe feelings about living with diabetes; Describe how stress, depression & anxiety affect blood glucose; Identify coping strategies; Identify support needed & support network available.  1 4/23/19 PC  3 PHQ-9 Depression Screen Score: 9     Developing strategies to promote health/change behavior: Identify 7 self-care behaviors; Personal health risk factors; Benefits, challenges & strategies for behavioral change; Individualized goal selection. 1    Goal: Add exercise in the morning.       Identified Barriers to learning/adherence to self management plan:    None    Instruction Method:  Lecture/Discussion, Power Point Presentation  and Handouts    Education Materials/Equipment Provided: Self-management manual, Meal Plan and Nutritional Packet       Encounter Type Date Attended Start Time End Time Comments No Show Dates   Assessment 4/23/19 PC 1800 1830   In person    Session 1 4/23/19 PC 1830 2100      Session 2 4/24/19 PC  4/24/19 REM 1800  1830 1830 2030     Session 3 4/25/19 REM 1800 2030      Session 4         Gestational Diabetes         Individual MNT        Meter Instrx        Insulin Instrx            Additional Comments:    DSMES Support Plan:  Follow-up plan/Date: 7/2019  Contact Post Class Regarding:   Fasting Blood Sugar   HgbA1C   Weight   Hypertension/Follow-up with Physician   Self-Foot Exam Frequency   Monitoring Frequency   Exercise Routine   Goal Attainment  Post Education Referrals:      []WIC   []PAP   []Wound Care   []Social Service   [] Marko Almanza [facility] Specialist   [][facility]   []Sleep Lab   []Other

## 2019-07-16 ENCOUNTER — PATIENT MESSAGE (OUTPATIENT)
Dept: ENDOCRINOLOGY | Age: 44
End: 2019-07-16

## 2019-07-17 RX ORDER — LISINOPRIL 20 MG/1
20 TABLET ORAL DAILY
Qty: 90 TABLET | Refills: 3 | Status: SHIPPED | OUTPATIENT
Start: 2019-07-17 | End: 2019-09-07 | Stop reason: SDUPTHER

## 2019-07-17 RX ORDER — DEXLANSOPRAZOLE 60 MG/1
60 CAPSULE, DELAYED RELEASE ORAL DAILY
Qty: 90 CAPSULE | Refills: 3 | Status: SHIPPED
Start: 2019-07-17 | End: 2020-09-09

## 2019-07-17 RX ORDER — ROSUVASTATIN CALCIUM 5 MG/1
5 TABLET, COATED ORAL NIGHTLY
Qty: 90 TABLET | Refills: 3 | Status: SHIPPED
Start: 2019-07-17 | End: 2020-09-09

## 2019-07-18 RX ORDER — GLIMEPIRIDE 4 MG/1
4 TABLET ORAL 2 TIMES DAILY
Qty: 180 TABLET | Refills: 4 | Status: SHIPPED | OUTPATIENT
Start: 2019-07-18 | End: 2019-10-28

## 2019-07-19 ENCOUNTER — TELEPHONE (OUTPATIENT)
Dept: ENDOCRINOLOGY | Age: 44
End: 2019-07-19

## 2019-08-12 ENCOUNTER — TELEPHONE (OUTPATIENT)
Dept: PRIMARY CARE CLINIC | Age: 44
End: 2019-08-12

## 2019-08-12 RX ORDER — AZITHROMYCIN 250 MG/1
250 TABLET, FILM COATED ORAL SEE ADMIN INSTRUCTIONS
Qty: 6 TABLET | Refills: 0 | Status: SHIPPED | OUTPATIENT
Start: 2019-08-12 | End: 2019-08-17

## 2019-08-16 ENCOUNTER — APPOINTMENT (OUTPATIENT)
Dept: GENERAL RADIOLOGY | Age: 44
End: 2019-08-16
Payer: COMMERCIAL

## 2019-08-16 ENCOUNTER — HOSPITAL ENCOUNTER (EMERGENCY)
Age: 44
Discharge: HOME | End: 2019-08-16
Payer: COMMERCIAL

## 2019-08-16 VITALS
OXYGEN SATURATION: 97 % | HEIGHT: 67 IN | HEART RATE: 89 BPM | TEMPERATURE: 98.8 F | SYSTOLIC BLOOD PRESSURE: 132 MMHG | RESPIRATION RATE: 16 BRPM | BODY MASS INDEX: 37.2 KG/M2 | WEIGHT: 237 LBS | DIASTOLIC BLOOD PRESSURE: 84 MMHG

## 2019-08-16 DIAGNOSIS — J40 BRONCHITIS: Primary | ICD-10-CM

## 2019-08-16 LAB
HCG, URINE, POC: NEGATIVE
Lab: NORMAL
NEGATIVE QC PASS/FAIL: NORMAL
POSITIVE QC PASS/FAIL: NORMAL

## 2019-08-16 PROCEDURE — 6360000002 HC RX W HCPCS: Performed by: NURSE PRACTITIONER

## 2019-08-16 PROCEDURE — 99283 EMERGENCY DEPT VISIT LOW MDM: CPT

## 2019-08-16 PROCEDURE — 94664 DEMO&/EVAL PT USE INHALER: CPT

## 2019-08-16 PROCEDURE — 94640 AIRWAY INHALATION TREATMENT: CPT

## 2019-08-16 PROCEDURE — 71046 X-RAY EXAM CHEST 2 VIEWS: CPT

## 2019-08-16 RX ORDER — METHYLPREDNISOLONE 4 MG/1
TABLET ORAL
Qty: 21 TABLET | Status: SHIPPED | OUTPATIENT
Start: 2019-08-16 | End: 2019-08-22

## 2019-08-16 RX ORDER — ALBUTEROL SULFATE 2.5 MG/3ML
2.5 SOLUTION RESPIRATORY (INHALATION) ONCE
Status: DISCONTINUED | OUTPATIENT
Start: 2019-08-16 | End: 2019-08-17 | Stop reason: HOSPADM

## 2019-08-16 RX ORDER — ALBUTEROL SULFATE 90 UG/1
2 AEROSOL, METERED RESPIRATORY (INHALATION) 4 TIMES DAILY PRN
Qty: 3 INHALER | Refills: 1 | Status: SHIPPED | OUTPATIENT
Start: 2019-08-16 | End: 2019-12-26 | Stop reason: SDUPTHER

## 2019-08-16 RX ORDER — ALBUTEROL SULFATE 2.5 MG/3ML
2.5 SOLUTION RESPIRATORY (INHALATION) ONCE
Status: COMPLETED | OUTPATIENT
Start: 2019-08-16 | End: 2019-08-16

## 2019-08-16 RX ADMIN — ALBUTEROL SULFATE 2.5 MG: 2.5 SOLUTION RESPIRATORY (INHALATION) at 20:15

## 2019-08-16 RX ADMIN — IPRATROPIUM BROMIDE 0.5 MG: 0.5 SOLUTION RESPIRATORY (INHALATION) at 20:15

## 2019-08-16 RX ADMIN — IPRATROPIUM BROMIDE 0.5 MG: 0.5 SOLUTION RESPIRATORY (INHALATION) at 22:09

## 2019-08-16 RX ADMIN — ALBUTEROL SULFATE 2.5 MG: 2.5 SOLUTION RESPIRATORY (INHALATION) at 22:09

## 2019-08-17 NOTE — ED PROVIDER NOTES
ED Attending  CC: No     HPI:  19, Time: 9:26 PM         [place] Robb is a 45 y.o. female presenting to the ED for a cough, onset yesterday. The complaint has been intermittent, moderate in severity. She reports just finishing azithromycin for an URI and now has a persistent cough. She denies fever, chest pain, SOB, nausea, or vomiting. Review of Systems:   Pertinent positives and negatives are stated within HPI, all other systems reviewed and are negative.          --------------------------------------------- PAST HISTORY ---------------------------------------------  Past Medical History:  has a past medical history of Diabetes mellitus ([address] Fatty liver disease, nonalcoholic, GERD (gastroesophageal reflux disease), Heart palpitations, Migraine, PCOS (polycystic ovarian syndrome), and Post partum depression. Past Surgical History:  has a past surgical history that includes  section and Dilation and curettage of uterus. Social History:  reports that she quit smoking about 2 years ago. Her smoking use included cigarettes. She has a 20.00 pack-year smoking history. She has never used smokeless tobacco. She reports that she does not drink alcohol or use drugs. Family History: family history includes Cancer in her maternal grandmother; Diabetes in her father and paternal grandfather; High Blood Pressure in her father and mother. The patients home medications have been reviewed. Allergies: Patient has no known allergies.     -------------------------------------------------- RESULTS -------------------------------------------------  All laboratory and radiology results have been personally reviewed by myself   LABS:  Results for orders placed or performed during the hospital encounter of 19   POC Pregnancy Urine   Result Value Ref Range    HCG, Urine, POC Negative Negative    Lot Number [id number]     Positive QC Pass/Fail Pass     Negative QC Pass/Fail Pass DuoNeb treatment during the ER course with symptom improvement and will be prescribed a taper of steroid and albuterol inhaler. She should follow-up with her PCP within 3 days and return to the ER if symptoms worsen. Counseling: The emergency provider has spoken with the patient and discussed todays results, in addition to providing specific details for the plan of care and counseling regarding the diagnosis and prognosis. Questions are answered at this time and they are agreeable with the plan.      --------------------------------- IMPRESSION AND DISPOSITION ---------------------------------    IMPRESSION  1. Bronchitis        DISPOSITION  Disposition: Discharge to home  Patient condition is stable      NOTE: This report was transcribed using voice recognition software.  Every effort was made to ensure accuracy; however, inadvertent computerized transcription errors may be present     TONI Aparicio  [place]  08/16/19 Martínez Ritter, TONI - CNP  08/16/19 [id number]

## 2019-09-09 ENCOUNTER — HOSPITAL ENCOUNTER (OUTPATIENT)
Age: 44
Discharge: HOME | End: 2019-09-11
Payer: COMMERCIAL

## 2019-09-09 ENCOUNTER — OFFICE VISIT (OUTPATIENT)
Dept: PRIMARY CARE CLINIC | Age: 44
End: 2019-09-09
Payer: COMMERCIAL

## 2019-09-09 VITALS
RESPIRATION RATE: 16 BRPM | DIASTOLIC BLOOD PRESSURE: 82 MMHG | SYSTOLIC BLOOD PRESSURE: 122 MMHG | HEIGHT: 67 IN | BODY MASS INDEX: 36.91 KG/M2 | OXYGEN SATURATION: 96 % | HEART RATE: 92 BPM | WEIGHT: 235.2 LBS

## 2019-09-09 DIAGNOSIS — E11.9 TYPE 2 DIABETES MELLITUS WITHOUT COMPLICATION, WITHOUT LONG-TERM CURRENT USE OF INSULIN (HCC): ICD-10-CM

## 2019-09-09 DIAGNOSIS — I10 ESSENTIAL HYPERTENSION: ICD-10-CM

## 2019-09-09 DIAGNOSIS — E78.49 FAMILIAL HYPERLIPIDEMIA: Chronic | ICD-10-CM

## 2019-09-09 DIAGNOSIS — E11.9 TYPE 2 DIABETES MELLITUS WITHOUT COMPLICATION, WITHOUT LONG-TERM CURRENT USE OF INSULIN (HCC): Primary | ICD-10-CM

## 2019-09-09 PROBLEM — J06.9 VIRAL URI: Status: RESOLVED | Noted: 2018-04-24 | Resolved: 2019-09-09

## 2019-09-09 LAB
ALBUMIN SERPL-MCNC: 4.5 G/DL (ref 3.5–5.2)
ALP BLD-CCNC: 64 U/L (ref 35–104)
ALT SERPL-CCNC: 29 U/L (ref 0–32)
ANION GAP SERPL CALCULATED.3IONS-SCNC: 15 MMOL/L (ref 7–16)
AST SERPL-CCNC: 22 U/L (ref 0–31)
BILIRUB SERPL-MCNC: 0.4 MG/DL (ref 0–1.2)
BUN BLDV-MCNC: 13 MG/DL (ref 6–20)
CALCIUM SERPL-MCNC: 9.7 MG/DL (ref 8.6–10.2)
CHLORIDE BLD-SCNC: 99 MMOL/L (ref 98–107)
CHOLESTEROL, TOTAL: 157 MG/DL (ref 0–199)
CO2: 25 MMOL/L (ref 22–29)
CREAT SERPL-MCNC: 0.6 MG/DL (ref 0.5–1)
CREATININE URINE: 95 MG/DL (ref 29–226)
GFR AFRICAN AMERICAN: >60
GFR NON-AFRICAN AMERICAN: >60 ML/MIN/1.73
GLUCOSE BLD-MCNC: 141 MG/DL (ref 74–99)
HBA1C MFR BLD: 8.5 % (ref 4–5.6)
HCT VFR BLD CALC: 41 % (ref 34–48)
HDLC SERPL-MCNC: 36 MG/DL
HEMOGLOBIN: 12.3 G/DL (ref 11.5–15.5)
LDL CHOLESTEROL CALCULATED: 76 MG/DL (ref 0–99)
MCH RBC QN AUTO: 25.4 PG (ref 26–35)
MCHC RBC AUTO-ENTMCNC: 30 % (ref 32–34.5)
MCV RBC AUTO: 84.7 FL (ref 80–99.9)
MICROALBUMIN UR-MCNC: <12 MG/L
MICROALBUMIN/CREAT UR-RTO: ABNORMAL (ref 0–30)
PDW BLD-RTO: 16 FL (ref 11.5–15)
PLATELET # BLD: 326 E9/L (ref 130–450)
PMV BLD AUTO: 14.1 FL (ref 7–12)
POTASSIUM SERPL-SCNC: 4.1 MMOL/L (ref 3.5–5)
RBC # BLD: 4.84 E12/L (ref 3.5–5.5)
SODIUM BLD-SCNC: 139 MMOL/L (ref 132–146)
TOTAL PROTEIN: 7.9 G/DL (ref 6.4–8.3)
TRIGL SERPL-MCNC: 223 MG/DL (ref 0–149)
TSH SERPL DL<=0.05 MIU/L-ACNC: 0.55 UIU/ML (ref 0.27–4.2)
VLDLC SERPL CALC-MCNC: 45 MG/DL
WBC # BLD: 7.6 E9/L (ref 4.5–11.5)

## 2019-09-09 PROCEDURE — 85027 COMPLETE CBC AUTOMATED: CPT

## 2019-09-09 PROCEDURE — 82044 UR ALBUMIN SEMIQUANTITATIVE: CPT

## 2019-09-09 PROCEDURE — 80053 COMPREHEN METABOLIC PANEL: CPT

## 2019-09-09 PROCEDURE — 83036 HEMOGLOBIN GLYCOSYLATED A1C: CPT

## 2019-09-09 PROCEDURE — 80061 LIPID PANEL: CPT

## 2019-09-09 PROCEDURE — 36415 COLL VENOUS BLD VENIPUNCTURE: CPT

## 2019-09-09 PROCEDURE — 82570 ASSAY OF URINE CREATININE: CPT

## 2019-09-09 PROCEDURE — 84443 ASSAY THYROID STIM HORMONE: CPT

## 2019-09-09 PROCEDURE — 99214 OFFICE O/P EST MOD 30 MIN: CPT | Performed by: FAMILY MEDICINE

## 2019-09-09 RX ORDER — LISINOPRIL 20 MG/1
TABLET ORAL
Qty: 90 TABLET | Refills: 3 | Status: SHIPPED
Start: 2019-09-09 | End: 2020-09-09

## 2019-09-09 ASSESSMENT — ENCOUNTER SYMPTOMS
CONSTIPATION: 0
BLOOD IN STOOL: 0
COLOR CHANGE: 0
SHORTNESS OF BREATH: 0
DIARRHEA: 0
NAUSEA: 0
SINUS PRESSURE: 0
SORE THROAT: 0
CHEST TIGHTNESS: 0
ABDOMINAL PAIN: 0
EYE PAIN: 0
RHINORRHEA: 0
COUGH: 0
EYE ITCHING: 0
BACK PAIN: 0
VOMITING: 0
EYE REDNESS: 0
WHEEZING: 0
APNEA: 0

## 2019-09-09 ASSESSMENT — PATIENT HEALTH QUESTIONNAIRE - PHQ9
SUM OF ALL RESPONSES TO PHQ9 QUESTIONS 1 & 2: 0
2. FEELING DOWN, DEPRESSED OR HOPELESS: 0
1. LITTLE INTEREST OR PLEASURE IN DOING THINGS: 0
SUM OF ALL RESPONSES TO PHQ QUESTIONS 1-9: 0
SUM OF ALL RESPONSES TO PHQ QUESTIONS 1-9: 0

## 2019-09-09 NOTE — PROGRESS NOTES
Chief Complaint:     Chief Complaint   Patient presents with    Diabetes    Hypertension         Diabetes   She presents for her follow-up diabetic visit. She has type 2 diabetes mellitus. Her disease course has been stable. Pertinent negatives for hypoglycemia include no dizziness, headaches or nervousness/anxiousness. There are no diabetic associated symptoms. Pertinent negatives for diabetes include no chest pain, no fatigue and no weakness. There are no hypoglycemic complications. Symptoms are stable. There are no diabetic complications. Pertinent negatives for diabetic complications include no CVA or PVD. Risk factors for coronary artery disease include diabetes mellitus and obesity. Current diabetic treatment includes oral agent (triple therapy). She is compliant with treatment all of the time. Her weight is stable. She is following a generally healthy diet. When asked about meal planning, she reported none. She rarely participates in exercise. There is no change in her home blood glucose trend. An ACE inhibitor/angiotensin II receptor blocker is being taken. She does not see a podiatrist.Eye exam is current. Hypertension   This is a chronic problem. The current episode started more than 1 month ago. The problem is unchanged. The problem is controlled. Associated symptoms include anxiety. Pertinent negatives include no chest pain, headaches, malaise/fatigue, neck pain, palpitations or shortness of breath. There are no associated agents to hypertension. Risk factors for coronary artery disease include diabetes mellitus, dyslipidemia and obesity. Past treatments include ACE inhibitors. The current treatment provides significant improvement. There are no compliance problems. There is no history of CAD/MI, CVA or PVD. There is no history of a hypertension causing med, pheochromocytoma, renovascular disease, sleep apnea or a thyroid problem.        Patient Active Problem List   Diagnosis    GERD (gastroesophageal reflux disease)    Essential hypertension    Type 2 diabetes mellitus without complication, without long-term current use of insulin (HCC)    Familial hyperlipidemia    Irritable bowel syndrome with diarrhea    Dysfunction of both eustachian tubes    DUB (dysfunctional uterine bleeding)       Past Medical History:   Diagnosis Date    Diabetes mellitus ([address]     Fatty liver disease, nonalcoholic     GERD (gastroesophageal reflux disease)     Heart palpitations     Migraine     PCOS (polycystic ovarian syndrome)     Post partum depression        Past Surgical History:   Procedure Laterality Date     SECTION      DILATION AND CURETTAGE OF UTERUS         Current Outpatient Medications   Medication Sig Dispense Refill    lisinopril (PRINIVIL;ZESTRIL) 20 MG tablet TAKE 1 TABLET DAILY 90 tablet 3    albuterol sulfate  (90 Base) MCG/ACT inhaler Inhale 2 puffs into the lungs 4 times daily as needed for Wheezing 3 Inhaler 1    metFORMIN (GLUCOPHAGE) 1000 MG tablet Take 1 tablet by mouth 2 times daily (with meals) 180 tablet 5    glimepiride (AMARYL) 4 MG tablet Take 1 tablet by mouth 2 times daily 180 tablet 4    DEXILANT 60 MG CPDR delayed release capsule Take 1 capsule by mouth daily 90 capsule 3    rosuvastatin (CRESTOR) 5 MG tablet Take 1 tablet by mouth nightly 90 tablet 3    VICTOZA 18 MG/3ML SOPN SC injection Inject 1.8 mg into the skin daily 27 mL 3    blood glucose test strips (ASCENSIA AUTODISC VI;ONE TOUCH ULTRA TEST VI) strip 1 each by In Vitro route daily As needed. 100 each 3    venlafaxine (EFFEXOR XR) 150 MG extended release capsule Take 1 capsule by mouth daily 90 capsule 3    glucose blood VI test strips (ASCENSIA AUTODISC VI;ONE TOUCH ULTRA TEST VI) strip 1 each by In Vitro route daily As needed.  100 each 3    Insulin Pen Needle (BD ULTRA-FINE PEN NEEDLES) 29G X 12.7MM MISC 1 each by Does not apply route daily Bd ultrafine needles to use with pain, hearing loss, nosebleeds, rhinorrhea, sinus pressure and sore throat. Eyes: Negative for pain, redness, itching and visual disturbance. Respiratory: Negative for apnea, cough, chest tightness, shortness of breath and wheezing. Cardiovascular: Negative for chest pain, palpitations and leg swelling. Gastrointestinal: Negative for abdominal pain, blood in stool, constipation, diarrhea, nausea and vomiting. Endocrine: Negative. Genitourinary: Negative for decreased urine volume, difficulty urinating, dysuria, frequency, hematuria and urgency. Musculoskeletal: Negative for arthralgias, back pain, gait problem, myalgias and neck pain. Skin: Negative for color change and rash. Allergic/Immunologic: Negative for environmental allergies and food allergies. Neurological: Negative for dizziness, weakness, light-headedness, numbness and headaches. Hematological: Negative for adenopathy. Does not bruise/bleed easily. Psychiatric/Behavioral: Negative for behavioral problems, dysphoric mood and sleep disturbance. The patient is not nervous/anxious and is not hyperactive. All other systems reviewed and are negative. /82   Pulse 92   Resp 16   Ht 5' 7\" (1.702 m)   Wt 235 lb 3.2 oz (106.7 kg)   LMP 09/05/2019   SpO2 96%   BMI 36.84 kg/m²     Physical Exam   Constitutional: She is oriented to person, place, and time. She appears well-developed and well-nourished. HENT:   Head: Normocephalic and atraumatic. Right Ear: External ear normal.   Left Ear: External ear normal.   Nose: Nose normal.   Mouth/Throat: Oropharynx is clear and moist.   Eyes: Pupils are equal, round, and reactive to light. Conjunctivae and EOM are normal. No scleral icterus. Neck: Normal range of motion. Neck supple. No thyromegaly present. Cardiovascular: Normal rate, regular rhythm and normal heart sounds. No murmur heard.   Pulmonary/Chest: Effort normal and breath sounds normal. No respiratory

## 2019-09-11 ENCOUNTER — PATIENT MESSAGE (OUTPATIENT)
Dept: PRIMARY CARE CLINIC | Age: 44
End: 2019-09-11

## 2019-09-13 ENCOUNTER — TELEPHONE (OUTPATIENT)
Dept: PRIMARY CARE CLINIC | Age: 44
End: 2019-09-13

## 2019-09-15 ENCOUNTER — TELEPHONE (OUTPATIENT)
Dept: ENDOCRINOLOGY | Age: 44
End: 2019-09-15

## 2019-09-15 DIAGNOSIS — E11.9 TYPE 2 DIABETES MELLITUS WITHOUT COMPLICATION, WITHOUT LONG-TERM CURRENT USE OF INSULIN (HCC): Primary | ICD-10-CM

## 2019-09-15 NOTE — TELEPHONE ENCOUNTER
Notify pt,  I have reviewed your recent results    A1c still above goal. Confirm she is currenlu on Metformin 1000 mg BID, Glimepiride 4 mg BID and Victoza 1.8 mg daily.  She is curently on Maximum dose of all these medications     I recommend adding Jardiance 25 mg daily in the morning    Send us sugar log a week after starting new medication

## 2019-09-16 ENCOUNTER — TELEPHONE (OUTPATIENT)
Dept: ENDOCRINOLOGY | Age: 44
End: 2019-09-16

## 2019-10-03 ENCOUNTER — TELEPHONE (OUTPATIENT)
Dept: ENDOCRINOLOGY | Age: 44
End: 2019-10-03

## 2019-10-25 DIAGNOSIS — E11.9 TYPE 2 DIABETES MELLITUS WITHOUT COMPLICATION, WITHOUT LONG-TERM CURRENT USE OF INSULIN (HCC): Primary | ICD-10-CM

## 2019-10-28 ENCOUNTER — OFFICE VISIT (OUTPATIENT)
Dept: ENDOCRINOLOGY | Age: 44
End: 2019-10-28
Payer: COMMERCIAL

## 2019-10-28 VITALS
SYSTOLIC BLOOD PRESSURE: 136 MMHG | HEIGHT: 67 IN | RESPIRATION RATE: 16 BRPM | DIASTOLIC BLOOD PRESSURE: 82 MMHG | WEIGHT: 230.6 LBS | OXYGEN SATURATION: 96 % | HEART RATE: 95 BPM | BODY MASS INDEX: 36.19 KG/M2

## 2019-10-28 DIAGNOSIS — E66.9 OBESITY WITHOUT SERIOUS COMORBIDITY, UNSPECIFIED CLASSIFICATION, UNSPECIFIED OBESITY TYPE: ICD-10-CM

## 2019-10-28 DIAGNOSIS — E11.9 TYPE 2 DIABETES MELLITUS WITHOUT COMPLICATION, WITHOUT LONG-TERM CURRENT USE OF INSULIN (HCC): Primary | ICD-10-CM

## 2019-10-28 DIAGNOSIS — E55.9 VITAMIN D DEFICIENCY: ICD-10-CM

## 2019-10-28 PROCEDURE — 99214 OFFICE O/P EST MOD 30 MIN: CPT | Performed by: INTERNAL MEDICINE

## 2019-10-28 RX ORDER — LIRAGLUTIDE 6 MG/ML
1.8 INJECTION SUBCUTANEOUS DAILY
Qty: 9 PEN | Refills: 5 | Status: SHIPPED
Start: 2019-10-28 | End: 2020-09-09 | Stop reason: SDUPTHER

## 2019-10-28 RX ORDER — GLIPIZIDE 10 MG/1
10 TABLET, FILM COATED, EXTENDED RELEASE ORAL 2 TIMES DAILY
Qty: 60 TABLET | Refills: 5 | Status: SHIPPED
Start: 2019-10-28 | End: 2020-07-08 | Stop reason: SDUPTHER

## 2019-11-25 DIAGNOSIS — E11.9 TYPE 2 DIABETES MELLITUS WITHOUT COMPLICATION, WITHOUT LONG-TERM CURRENT USE OF INSULIN (HCC): ICD-10-CM

## 2019-12-26 ENCOUNTER — E-VISIT (OUTPATIENT)
Dept: FAMILY MEDICINE CLINIC | Age: 44
End: 2019-12-26

## 2019-12-26 ENCOUNTER — E-VISIT (OUTPATIENT)
Dept: PRIMARY CARE CLINIC | Age: 44
End: 2019-12-26
Payer: COMMERCIAL

## 2019-12-26 DIAGNOSIS — J40 BRONCHITIS: Primary | ICD-10-CM

## 2019-12-26 DIAGNOSIS — B37.31 VAGINAL YEAST INFECTION: Primary | ICD-10-CM

## 2019-12-26 PROCEDURE — 98969 PR NONPHYSICIAN ONLINE ASSESSMENT AND MANAGEMENT: CPT | Performed by: NURSE PRACTITIONER

## 2019-12-26 RX ORDER — DOXYCYCLINE HYCLATE 100 MG
100 TABLET ORAL 2 TIMES DAILY
Qty: 20 TABLET | Refills: 0 | Status: SHIPPED | OUTPATIENT
Start: 2019-12-26 | End: 2020-01-05

## 2019-12-26 RX ORDER — ALBUTEROL SULFATE 90 UG/1
2 AEROSOL, METERED RESPIRATORY (INHALATION) 4 TIMES DAILY PRN
Qty: 1 INHALER | Refills: 1 | Status: SHIPPED | OUTPATIENT
Start: 2019-12-26 | End: 2021-07-29

## 2019-12-26 RX ORDER — BENZONATATE 100 MG/1
100 CAPSULE ORAL 3 TIMES DAILY PRN
Qty: 30 CAPSULE | Refills: 0 | Status: SHIPPED | OUTPATIENT
Start: 2019-12-26 | End: 2020-01-02

## 2019-12-26 RX ORDER — FLUCONAZOLE 150 MG/1
150 TABLET ORAL ONCE
Qty: 2 TABLET | Refills: 0 | Status: SHIPPED | OUTPATIENT
Start: 2019-12-26 | End: 2019-12-26

## 2019-12-26 ASSESSMENT — LIFESTYLE VARIABLES
SMOKING_YEARS: 5
SMOKING_STATUS: YES

## 2020-01-13 ENCOUNTER — HOSPITAL ENCOUNTER (OUTPATIENT)
Age: 45
Discharge: HOME | End: 2020-01-15
Payer: COMMERCIAL

## 2020-01-13 PROCEDURE — 87070 CULTURE OTHR SPECIMN AEROBIC: CPT

## 2020-01-17 LAB — GENITAL CULTURE, ROUTINE: NORMAL

## 2020-02-01 ENCOUNTER — PATIENT MESSAGE (OUTPATIENT)
Dept: PRIMARY CARE CLINIC | Age: 45
End: 2020-02-01

## 2020-02-02 NOTE — TELEPHONE ENCOUNTER
From: Elda Talamantes  To: Pankaj Garner DO  Sent: 2/1/2020 1:12 PM EST  Subject: Prescription Question    Cant u please send a new script for effexor 150mg to mail order place when u have a minute?

## 2020-02-03 ENCOUNTER — PATIENT MESSAGE (OUTPATIENT)
Dept: PRIMARY CARE CLINIC | Age: 45
End: 2020-02-03

## 2020-02-03 RX ORDER — VENLAFAXINE HYDROCHLORIDE 150 MG/1
150 CAPSULE, EXTENDED RELEASE ORAL DAILY
Qty: 90 CAPSULE | Refills: 3 | Status: SHIPPED | OUTPATIENT
Start: 2020-02-03 | End: 2020-02-03 | Stop reason: SDUPTHER

## 2020-02-03 RX ORDER — VENLAFAXINE HYDROCHLORIDE 150 MG/1
150 CAPSULE, EXTENDED RELEASE ORAL DAILY
Qty: 90 CAPSULE | Refills: 3 | Status: SHIPPED
Start: 2020-02-03 | End: 2021-05-04 | Stop reason: SDUPTHER

## 2020-02-03 NOTE — TELEPHONE ENCOUNTER
From: Iris Capone  To: Quyen Montes DO  Sent: 2/3/2020 2:42 PM EST  Subject: Prescription Question    Can you please send 90 day script for effexor 150mg to [facility] pharmacy?  Thank you in advance :)

## 2020-02-27 ENCOUNTER — OFFICE VISIT (OUTPATIENT)
Dept: ENDOCRINOLOGY | Age: 45
End: 2020-02-27
Payer: COMMERCIAL

## 2020-02-27 VITALS
DIASTOLIC BLOOD PRESSURE: 88 MMHG | RESPIRATION RATE: 16 BRPM | HEART RATE: 96 BPM | OXYGEN SATURATION: 97 % | BODY MASS INDEX: 34.69 KG/M2 | WEIGHT: 221 LBS | HEIGHT: 67 IN | SYSTOLIC BLOOD PRESSURE: 138 MMHG

## 2020-02-27 LAB — HBA1C MFR BLD: 6.9 %

## 2020-02-27 PROCEDURE — 99214 OFFICE O/P EST MOD 30 MIN: CPT | Performed by: INTERNAL MEDICINE

## 2020-02-27 PROCEDURE — 83036 HEMOGLOBIN GLYCOSYLATED A1C: CPT | Performed by: INTERNAL MEDICINE

## 2020-02-27 NOTE — PATIENT INSTRUCTIONS
Recommendations for today's visit  · Stop Jardiance   · Increase Glipizide 10 mg twice a day   · cotninue Metformin 1000 mg twice a day, Victoza 1.8 mg daily, Levemir 25 units daily at bedtime   · Check Blood sugar 2 times/day before meals and send us sugar log in a week     These are your blood sugar, blood pressure, cholesterol and A1c goals  · Blood sugar fastin mg/dl to 130 mg/dl  · Blood sugar before meals: <150 mg/dl  · Peak blood sugar lower than 180 mg/dl  · Bad cholesterol (LDL cholesterol): less than 100 mg/dl  · Blood pressure: less than 140/80 mmHg\  · A1c: between 6.5 - 7%      Steps for managing low blood sugar  1. Eat 15 grams of glucose of simple carbohydrate, as found in:   1 tablespoon sugar, Seth or corn syrup    4 oz (1/2 cup) of juice or regular soda   Glucose Tablet or gel (follow package instruction)   2. Wait 15 min and check blood sugar again   3. Repeat until blood sugar within range  4.  Once within range, follow up with snack or meal within 1 hour      I you have any questions please call Dr. Ligia Vale office       Jasmina Denny MD  Endocrinologist, [facility])   [facility] [address] [place] [address]   Phone: [phone]  Fax: [phone]

## 2020-02-27 NOTE — PROGRESS NOTES
Grandmother         unknown if it was uterine, ovarian or cervix     Diabetes Paternal Grandfather      ALLERGIES AND DRUG REACTIONS   No Known Allergies    CURRENT MEDICATIONS   Current Outpatient Medications   Medication Sig Dispense Refill    venlafaxine (EFFEXOR XR) 150 MG extended release capsule Take 1 capsule by mouth daily 90 capsule 3    nystatin-triamcinolone (MYCOLOG II) 007899-8.1 UNIT/GM-% cream Apply topically 2 times daily. 1 Tube 3    albuterol sulfate  (90 Base) MCG/ACT inhaler Inhale 2 puffs into the lungs 4 times daily as needed for Wheezing 1 Inhaler 1    insulin detemir (LEVEMIR FLEXTOUCH) 100 UNIT/ML injection pen Inject 25 Units into the skin nightly 15 pen 5    Cholecalciferol (VITAMIN D3) 125 MCG (5000 UT) TABS Take by mouth daily      blood glucose test strips (ASCENSIA AUTODISC VI;ONE TOUCH ULTRA TEST VI) strip 1 each by In Vitro route 3 times daily 250 each 5    empagliflozin (JARDIANCE) 25 MG tablet Take 25 mg by mouth daily 30 tablet 5    metFORMIN (GLUCOPHAGE) 1000 MG tablet Take 1 tablet by mouth 2 times daily (with meals) 180 tablet 5    VICTOZA 18 MG/3ML SOPN SC injection Inject 1.8 mg into the skin daily 9 pen 5    glipiZIDE (GLUCOTROL XL) 10 MG extended release tablet Take 1 tablet by mouth 2 times daily (Patient taking differently: Take 10 mg by mouth daily ) 60 tablet 5    Insulin Pen Needle (BD ULTRA-FINE PEN NEEDLES) 29G X 12.7MM MISC 1 each by Does not apply route 2 times daily Bd ultrafine needles to use with victoza pen 100 each 5    lisinopril (PRINIVIL;ZESTRIL) 20 MG tablet TAKE 1 TABLET DAILY 90 tablet 3    DEXILANT 60 MG CPDR delayed release capsule Take 1 capsule by mouth daily 90 capsule 3    rosuvastatin (CRESTOR) 5 MG tablet Take 1 tablet by mouth nightly 90 tablet 3    glucose blood VI test strips (ASCENSIA AUTODISC VI;ONE TOUCH ULTRA TEST VI) strip 1 each by In Vitro route daily As needed.  100 each 3     No current facility-administered of Laboratory Data:  I personally reviewed the following lab:  Lab Results   Component Value Date/Time    WBC 7.6 09/09/2019 11:52 AM    RBC 4.84 09/09/2019 11:52 AM    HGB 12.3 09/09/2019 11:52 AM    HCT 41.0 09/09/2019 11:52 AM    MCV 84.7 09/09/2019 11:52 AM    MCH 25.4 (L) 09/09/2019 11:52 AM    MCHC 30.0 (L) 09/09/2019 11:52 AM    RDW 16.0 (H) 09/09/2019 11:52 AM     09/09/2019 11:52 AM    MPV 14.1 (H) 09/09/2019 11:52 AM      Lab Results   Component Value Date/Time     09/09/2019 11:52 AM    K 4.1 09/09/2019 11:52 AM    CO2 25 09/09/2019 11:52 AM    BUN 13 09/09/2019 11:52 AM    CREATININE 0.6 09/09/2019 11:52 AM    CALCIUM 9.7 09/09/2019 11:52 AM    LABGLOM >60 09/09/2019 11:52 AM    GFRAA >60 09/09/2019 11:52 AM      Lab Results   Component Value Date/Time    TSH 0.551 09/09/2019 11:52 AM     Lab Results   Component Value Date    LABA1C 6.9 02/27/2020    GLUCOSE 141 09/09/2019    GLUCOSE 124 02/01/2012    MALBCR - 09/09/2019    LABMICR <12.0 09/09/2019    LABCREA 95 09/09/2019     Lab Results   Component Value Date    LABA1C 6.9 02/27/2020    LABA1C 8.5 09/09/2019    LABA1C 8.7 01/17/2019     Lab Results   Component Value Date    CHOL 157 09/09/2019    CHOL 138 01/17/2019    TRIG 223 09/09/2019    TRIG 199 01/17/2019    HDL 36 09/09/2019    HDL 36 01/17/2019     No results found for: [address]   Anusha Valerio, a 45 y.o.-old female seen in for the following issues     Diabetes Mellitus Type 2     · Improving control, A1v 6.9%   · Will change DM regimen to levemir 25 units at bedtime, Metformin er 1000 BID, Victoza 1.8 mg daily, Glipiizde 10 mg BID  · Stop Jardiance   · The patient was advised to check blood sugars 2-3 times a day before meals and at bedtime and fax the results to our office in a week.   · Discussed with patient A1c and blood sugar goals   · Optimal blood sugars: 100-140 pre-prandial, < 180 peak post-prandial  · The patient counseled about the

## 2020-04-09 RX ORDER — LIRAGLUTIDE 6 MG/ML
1.8 INJECTION SUBCUTANEOUS DAILY
Qty: 9 PEN | Refills: 3 | Status: SHIPPED
Start: 2020-04-09 | End: 2020-07-08

## 2020-07-02 ENCOUNTER — HOSPITAL ENCOUNTER (OUTPATIENT)
Age: 45
Discharge: HOME | End: 2020-07-04
Payer: COMMERCIAL

## 2020-07-02 LAB
ANION GAP SERPL CALCULATED.3IONS-SCNC: 20 MMOL/L (ref 7–16)
BUN BLDV-MCNC: 11 MG/DL (ref 6–20)
CALCIUM SERPL-MCNC: 9.8 MG/DL (ref 8.6–10.2)
CHLORIDE BLD-SCNC: 105 MMOL/L (ref 98–107)
CHOLESTEROL, TOTAL: 110 MG/DL (ref 0–199)
CO2: 18 MMOL/L (ref 22–29)
CREAT SERPL-MCNC: 0.8 MG/DL (ref 0.5–1)
CREATININE URINE: 104 MG/DL (ref 29–226)
GFR AFRICAN AMERICAN: >60
GFR NON-AFRICAN AMERICAN: >60 ML/MIN/1.73
GLUCOSE BLD-MCNC: 121 MG/DL (ref 74–99)
HBA1C MFR BLD: 6.6 % (ref 4–5.6)
HDLC SERPL-MCNC: 32 MG/DL
LDL CHOLESTEROL CALCULATED: 47 MG/DL (ref 0–99)
MICROALBUMIN UR-MCNC: <12 MG/L
MICROALBUMIN/CREAT UR-RTO: ABNORMAL (ref 0–30)
POTASSIUM SERPL-SCNC: 5 MMOL/L (ref 3.5–5)
SODIUM BLD-SCNC: 143 MMOL/L (ref 132–146)
TRIGL SERPL-MCNC: 155 MG/DL (ref 0–149)
VITAMIN D 25-HYDROXY: 94 NG/ML (ref 30–100)
VLDLC SERPL CALC-MCNC: 31 MG/DL

## 2020-07-02 PROCEDURE — 80048 BASIC METABOLIC PNL TOTAL CA: CPT

## 2020-07-02 PROCEDURE — 82306 VITAMIN D 25 HYDROXY: CPT

## 2020-07-02 PROCEDURE — 36415 COLL VENOUS BLD VENIPUNCTURE: CPT

## 2020-07-02 PROCEDURE — 82044 UR ALBUMIN SEMIQUANTITATIVE: CPT

## 2020-07-02 PROCEDURE — 80061 LIPID PANEL: CPT

## 2020-07-02 PROCEDURE — 83036 HEMOGLOBIN GLYCOSYLATED A1C: CPT

## 2020-07-02 PROCEDURE — 82570 ASSAY OF URINE CREATININE: CPT

## 2020-07-08 ENCOUNTER — VIRTUAL VISIT (OUTPATIENT)
Dept: ENDOCRINOLOGY | Age: 45
End: 2020-07-08
Payer: COMMERCIAL

## 2020-07-08 PROCEDURE — 99214 OFFICE O/P EST MOD 30 MIN: CPT | Performed by: INTERNAL MEDICINE

## 2020-07-08 RX ORDER — GLIPIZIDE 10 MG/1
10 TABLET, FILM COATED, EXTENDED RELEASE ORAL 2 TIMES DAILY
Qty: 180 TABLET | Refills: 3 | Status: SHIPPED
Start: 2020-07-08 | End: 2021-06-01

## 2020-07-08 RX ORDER — INSULIN DETEMIR 100 [IU]/ML
25 INJECTION, SOLUTION SUBCUTANEOUS NIGHTLY
Qty: 15 PEN | Refills: 5 | Status: SHIPPED
Start: 2020-07-08 | End: 2020-11-18 | Stop reason: SDUPTHER

## 2020-07-08 NOTE — LETTER
[address] [place] [address] Department of Endocrinology Diabetes and Metabolism   [address] [place] [address]   Phone: [phone]  Fax: [phone]      Provider: Joyce Yeboah MD  Primary Care Physician: Francie Tsai DO   Referring Provider: No ref. provider found    Patient: Marga Ray  YOB: 1975  Date of Visit: 7/8/2020      Dear Dr. Francie Tsai DO   I had the pleasure of seeing your patient Marga Ray today at endocrine clinic for follow up visit and I enclosed a copy of the office visit completed today. Thank you very much for asking us to participate in the care of this very pleasant patient. Please don't hesitate to call if there are any further questions or concerns. Sincerely   Joyce Yeboah MD  Endocrinologist, [address] [address]   Phone: [phone]  Fax: [phone]      [address] [place] [address] Department of Endocrinology Diabetes and Metabolism   [address] [place] [address], [address] [place] [address],[address] [address]   Phone: [phone]  Fax: [phone]\    Date of Service: 7/8/2020    Primary Care Physician: Francie Tsai DO  Provider: Joyce Yeboah MD     Reason for the visit:  DM type 2     History of Present Illness: The history is provided by the patient. No  was used. Accuracy of the patient data is excellent. Marga Ray is a very pleasant 39 y.o. female with past medical history of PCOS, HTN and Obesity seen today for follow up visit     Marga Ray was diagnosed with diabetes early 35s.  Started as gestational diabetes and she is currently on Levemir 25 units at bedtime, Glipizide ER 10 mg BID, Metformin er 1000 daily, Victoza 1.8 mg daily    Over the past 1-3  times a day and all readings at goal   Most recent A1c summarized below  Lab Results   Component Value Date    LABA1C 6.6 07/02/2020    LABA1C 6.9 02/27/2020    LABA1C 8.5 09/09/2019      Patient has had no hypoglycemic episodes Very good with following diabetes diet and encouraged   I reviewed current medications and the patient has no issues with diabetes medications  Xavier Torres is up to date with eye exam and denied any history of diabetic retinopathy, retinopathy   The patient performs her own feet care and doesn't see podiatry   Microvascular complications:  No Retinopathy, Nephropathy or Neuropathy   Macrovascular complications: no CAD, PVD, or Stroke  On statin     PAST MEDICAL HISTORY   Past Medical History:   Diagnosis Date    Diabetes mellitus ([address]     Fatty liver disease, nonalcoholic     GERD (gastroesophageal reflux disease)     Heart palpitations     Migraine     PCOS (polycystic ovarian syndrome)     Post partum depression      PAST SURGICAL HISTORY   Past Surgical History:   Procedure Laterality Date     SECTION      DILATION AND CURETTAGE OF UTERUS       SOCIAL HISTORY   Tobacco:   reports that she quit smoking about 3 years ago. Her smoking use included cigarettes. She has a 20.00 pack-year smoking history. She has never used smokeless tobacco.  Alcol:   reports no history of alcohol use. Illicit Drugs:   reports no history of drug use.     FAMILY HISTORY   Family History   Problem Relation Age of Onset    High Blood Pressure Mother     High Blood Pressure Father     Diabetes Father     Cancer Maternal Grandmother         unknown if it was uterine, ovarian or cervix     Diabetes Paternal Grandfather      ALLERGIES AND DRUG REACTIONS   No Known Allergies    CURRENT MEDICATIONS   Current Outpatient Medications   Medication Sig Dispense Refill    insulin detemir (LEVEMIR FLEXTOUCH) 100 UNIT/ML injection pen Inject 25 Units into the skin nightly 15 pen 5    glipiZIDE (GLUCOTROL XL) 10 MG extended release tablet Take 1 tablet by mouth 2 times daily 180 tablet 3    venlafaxine (EFFEXOR XR) 150 MG extended release capsule Take 1 capsule by mouth daily 90 capsule 3  albuterol sulfate  (90 Base) MCG/ACT inhaler Inhale 2 puffs into the lungs 4 times daily as needed for Wheezing 1 Inhaler 1    Cholecalciferol (VITAMIN D3) 125 MCG (5000 UT) TABS Take by mouth daily      blood glucose test strips (ASCENSIA AUTODISC VI;ONE TOUCH ULTRA TEST VI) strip 1 each by In Vitro route 3 times daily 250 each 5    metFORMIN (GLUCOPHAGE) 1000 MG tablet Take 1 tablet by mouth 2 times daily (with meals) 180 tablet 5    VICTOZA 18 MG/3ML SOPN SC injection Inject 1.8 mg into the skin daily 9 pen 5    Insulin Pen Needle (BD ULTRA-FINE PEN NEEDLES) 29G X 12.7MM MISC 1 each by Does not apply route 2 times daily Bd ultrafine needles to use with victoza pen 100 each 5    lisinopril (PRINIVIL;ZESTRIL) 20 MG tablet TAKE 1 TABLET DAILY 90 tablet 3    DEXILANT 60 MG CPDR delayed release capsule Take 1 capsule by mouth daily 90 capsule 3    rosuvastatin (CRESTOR) 5 MG tablet Take 1 tablet by mouth nightly 90 tablet 3    glucose blood VI test strips (ASCENSIA AUTODISC VI;ONE TOUCH ULTRA TEST VI) strip 1 each by In Vitro route daily As needed. 100 each 3    nystatin-triamcinolone (MYCOLOG II) 748611-8.1 UNIT/GM-% cream Apply topically 2 times daily. (Patient not taking: Reported on 7/8/2020) 1 Tube 3     No current facility-administered medications for this visit. Review of Systems  Constitutional: No fever, no chills, no diaphoresis, no generalized weakness. HEENT: No blurred vision, No sore throat, no ear pain, no hair loss  Neck: denied any neck swelling, difficulty swallowing,   Cardio-pulmonary: No CP, SOB or palpitation, No orthopnea or PND. No cough or wheezing. GI: No N/V/D, no constipation, No abdominal pain, no melena or hematochezia   : Denied any dysuria, hematuria, flank pain, discharge, or incontinence. Skin: denied any rash, ulcer, Hirsute, or hyperpigmentation. MSK: denied any joint deformity, joint pain/swelling, muscle pain, or back pain. Component Value Date    CHOL 110 07/02/2020    CHOL 157 09/09/2019    TRIG 155 07/02/2020    TRIG 223 09/09/2019    HDL 32 07/02/2020    HDL 36 09/09/2019     Lab Results   Component Value Date    VITD25 94 07/02/2020         ASSESSMENT & RECOMMENDATIONS   Yesy Serna, a 39 y.o.-old female seen in for the following issues     Diabetes Mellitus Type 2     · Under good control   · Continue levemir 25 units at bedtime, Metformin er 1000 BID, Victoza 1.8 mg daily, Glipizide ER 10 mg BID  · Continue checking blood sugars 1-3 times a day  · Discussed with patient A1c and blood sugar goals   · Patient up to date with the routine diabetes maintenance and prevention   · Diabetes labs before next visit     H/o Dietary noncompliance  ? Improved after seeing diabetes educator     HLD  · At goal  · Continue Crestor 5 mg daily     Obesity  ? Discussed lifestyle changes including diet and exercise with patient in depth. Also discussed with patient cardiovascular risk associated with obesity  ? On GLP-1 agonist     VitD deficiency   · Level was 94  · Will change vitD from 5000 to 2000 U/day/    Return in about 4 months (around 11/8/2020) for DM type 2. The above issues were reviewed with the patient who understood and agreed with the plan. Thank you for allowing us to participate in the care of this patient. Please do not hesitate to contact us with any additional questions. Diagnosis Orders   1. Vitamin D deficiency     2. Type 2 diabetes mellitus without complication, without long-term current use of insulin ([facility])  insulin detemir (LEVEMIR FLEXTOUCH) 100 UNIT/ML injection pen    glipiZIDE (GLUCOTROL XL) 10 MG extended release tablet   3. IDDM (insulin dependent diabetes mellitus) (HCC)  insulin detemir (LEVEMIR FLEXTOUCH) 100 UNIT/ML injection pen   4. Hyperlipidemia, unspecified hyperlipidemia type     5.  Obesity without serious comorbidity, unspecified classification, unspecified obesity type         Tray Garcia MD Endocrinologist, [facility])   [address] [facility] [address]   Phone: [phone]  Fax: [phone]  --------------------------------------------  An electronic signature was used to authenticate this note. Mundo Eaton MD on 7/8/2020 at 10:20 AM  Services were provided through a video synchronous discussion virtually to substitute for in-person clinic visit. Pursuant to the emergency declaration under the [facility], [facility] waiver authority and the [employer] and [employer] Act, this Virtual  Visit was conducted, with patient's consent, to reduce the patient's risk of exposure to COVID-19 and provide continuity of care.

## 2020-07-08 NOTE — PROGRESS NOTES
[address] [place] [address] Department of Endocrinology Diabetes and Metabolism   [address] [facility] [address]   Phone: [phone]  Fax: [phone]\    Date of Service: 2020    Primary Care Physician: Missy Le DO  Provider: Dory Loving MD     Reason for the visit:  DM type 2     History of Present Illness: The history is provided by the patient. No  was used. Accuracy of the patient data is excellent. Ray Gonzalez is a very pleasant 39 y.o. female with past medical history of PCOS, HTN and Obesity seen today for follow up visit     Ray Gonzalez was diagnosed with diabetes early 35s.  Started as gestational diabetes and she is currently on Levemir 25 units at bedtime, Glipizide ER 10 mg BID, Metformin er 1000 daily, Victoza 1.8 mg daily    Over the past 1-3  times a day and all readings at goal   Most recent A1c summarized below  Lab Results   Component Value Date    LABA1C 6.6 2020    LABA1C 6.9 2020    LABA1C 8.5 2019      Patient has had no hypoglycemic episodes   Very good with following diabetes diet and encouraged   I reviewed current medications and the patient has no issues with diabetes medications  Ray Gonzalez is up to date with eye exam and denied any history of diabetic retinopathy, retinopathy   The patient performs her own feet care and doesn't see podiatry   Microvascular complications:  No Retinopathy, Nephropathy or Neuropathy   Macrovascular complications: no CAD, PVD, or Stroke  On statin     PAST MEDICAL HISTORY   Past Medical History:   Diagnosis Date    Diabetes mellitus ([address]     Fatty liver disease, nonalcoholic     GERD (gastroesophageal reflux disease)     Heart palpitations     Migraine     PCOS (polycystic ovarian syndrome)     Post partum depression      PAST SURGICAL HISTORY   Past Surgical History:   Procedure Laterality Date     SECTION      DILATION AND CURETTAGE OF UTERUS       SOCIAL HISTORY Tobacco:   reports that she quit smoking about 3 years ago. Her smoking use included cigarettes. She has a 20.00 pack-year smoking history. She has never used smokeless tobacco.  Alcol:   reports no history of alcohol use. Illicit Drugs:   reports no history of drug use.     FAMILY HISTORY   Family History   Problem Relation Age of Onset    High Blood Pressure Mother     High Blood Pressure Father     Diabetes Father     Cancer Maternal Grandmother         unknown if it was uterine, ovarian or cervix     Diabetes Paternal Grandfather      ALLERGIES AND DRUG REACTIONS   No Known Allergies    CURRENT MEDICATIONS   Current Outpatient Medications   Medication Sig Dispense Refill    insulin detemir (LEVEMIR FLEXTOUCH) 100 UNIT/ML injection pen Inject 25 Units into the skin nightly 15 pen 5    glipiZIDE (GLUCOTROL XL) 10 MG extended release tablet Take 1 tablet by mouth 2 times daily 180 tablet 3    venlafaxine (EFFEXOR XR) 150 MG extended release capsule Take 1 capsule by mouth daily 90 capsule 3    albuterol sulfate  (90 Base) MCG/ACT inhaler Inhale 2 puffs into the lungs 4 times daily as needed for Wheezing 1 Inhaler 1    Cholecalciferol (VITAMIN D3) 125 MCG (5000 UT) TABS Take by mouth daily      blood glucose test strips (ASCENSIA AUTODISC VI;ONE TOUCH ULTRA TEST VI) strip 1 each by In Vitro route 3 times daily 250 each 5    metFORMIN (GLUCOPHAGE) 1000 MG tablet Take 1 tablet by mouth 2 times daily (with meals) 180 tablet 5    VICTOZA 18 MG/3ML SOPN SC injection Inject 1.8 mg into the skin daily 9 pen 5    Insulin Pen Needle (BD ULTRA-FINE PEN NEEDLES) 29G X 12.7MM MISC 1 each by Does not apply route 2 times daily Bd ultrafine needles to use with victoza pen 100 each 5    lisinopril (PRINIVIL;ZESTRIL) 20 MG tablet TAKE 1 TABLET DAILY 90 tablet 3    DEXILANT 60 MG CPDR delayed release capsule Take 1 capsule by mouth daily 90 capsule 3    rosuvastatin (CRESTOR) 5 MG tablet Take 1 tablet by mouth nightly 90 tablet 3    glucose blood VI test strips (ASCENSIA AUTODISC VI;ONE TOUCH ULTRA TEST VI) strip 1 each by In Vitro route daily As needed. 100 each 3    nystatin-triamcinolone (MYCOLOG II) 963908-8.1 UNIT/GM-% cream Apply topically 2 times daily. (Patient not taking: Reported on 7/8/2020) 1 Tube 3     No current facility-administered medications for this visit. Review of Systems  Constitutional: No fever, no chills, no diaphoresis, no generalized weakness. HEENT: No blurred vision, No sore throat, no ear pain, no hair loss  Neck: denied any neck swelling, difficulty swallowing,   Cardio-pulmonary: No CP, SOB or palpitation, No orthopnea or PND. No cough or wheezing. GI: No N/V/D, no constipation, No abdominal pain, no melena or hematochezia   : Denied any dysuria, hematuria, flank pain, discharge, or incontinence. Skin: denied any rash, ulcer, Hirsute, or hyperpigmentation. MSK: denied any joint deformity, joint pain/swelling, muscle pain, or back pain. Neuro: no numbness, no tingling, no weakness, _    OBJECTIVE    There were no vitals taken for this visit. BP Readings from Last 4 Encounters:   02/27/20 138/88   01/13/20 128/81   10/28/19 136/82   09/09/19 122/82     Wt Readings from Last 6 Encounters:   02/27/20 221 lb (100.2 kg)   01/13/20 223 lb 6.4 oz (101.3 kg)   10/28/19 230 lb 9.6 oz (104.6 kg)   09/09/19 235 lb 3.2 oz (106.7 kg)   08/16/19 237 lb (107.5 kg)   03/04/19 247 lb (112 kg)     Physical examination:  Due to this being a TeleHealth encounter, evaluation of the following organ systems is limited: EENT/Resp/CV/GI//MS/Neuro/Skin/Heme-Lymph-Imm. General: awake alert, oriented x3, no abnormal position or movements.   Pulm: move with respiration   Skin: no rash  Psych: normal mood, and affect    Review of Laboratory Data:  I personally reviewed the following lab:  Lab Results   Component Value Date/Time    WBC 7.6 09/09/2019 11:52 AM    RBC 4.84 09/09/2019 11:52 AM HGB 12.3 09/09/2019 11:52 AM    HCT 41.0 09/09/2019 11:52 AM    MCV 84.7 09/09/2019 11:52 AM    MCH 25.4 (L) 09/09/2019 11:52 AM    MCHC 30.0 (L) 09/09/2019 11:52 AM    RDW 16.0 (H) 09/09/2019 11:52 AM     09/09/2019 11:52 AM    MPV 14.1 (H) 09/09/2019 11:52 AM      Lab Results   Component Value Date/Time     07/02/2020 08:21 AM    K 5.0 07/02/2020 08:21 AM    CO2 18 (L) 07/02/2020 08:21 AM    BUN 11 07/02/2020 08:21 AM    CREATININE 0.8 07/02/2020 08:21 AM    CALCIUM 9.8 07/02/2020 08:21 AM    LABGLOM >60 07/02/2020 08:21 AM    GFRAA >60 07/02/2020 08:21 AM      Lab Results   Component Value Date/Time    TSH 0.551 09/09/2019 11:52 AM     Lab Results   Component Value Date    LABA1C 6.6 07/02/2020    GLUCOSE 121 07/02/2020    GLUCOSE 124 02/01/2012    MALBCR - 07/02/2020    LABMICR <12.0 07/02/2020    LABCREA 104 07/02/2020     Lab Results   Component Value Date    LABA1C 6.6 07/02/2020    LABA1C 6.9 02/27/2020    LABA1C 8.5 09/09/2019     Lab Results   Component Value Date    CHOL 110 07/02/2020    CHOL 157 09/09/2019    TRIG 155 07/02/2020    TRIG 223 09/09/2019    HDL 32 07/02/2020    HDL 36 09/09/2019     Lab Results   Component Value Date    VITD25 94 07/02/2020         ASSESSMENT & RECOMMENDATIONS   Valerie Nagy, a 39 y.o.-old female seen in for the following issues     Diabetes Mellitus Type 2     · Under good control   · Continue levemir 25 units at bedtime, Metformin er 1000 BID, Victoza 1.8 mg daily, Glipizide ER 10 mg BID  · Continue checking blood sugars 1-3 times a day  · Discussed with patient A1c and blood sugar goals   · Patient up to date with the routine diabetes maintenance and prevention   · Diabetes labs before next visit     H/o Dietary noncompliance   Improved after seeing diabetes educator     HLD  · At goal  · Continue Crestor 5 mg daily     Obesity   Discussed lifestyle changes including diet and exercise with patient in depth.  Also discussed with patient cardiovascular risk associated with obesity   On GLP-1 agonist     VitD deficiency   · Level was 94  · Will change vitD from 5000 to 2000 U/day/    Return in about 4 months (around 11/8/2020) for DM type 2. The above issues were reviewed with the patient who understood and agreed with the plan. Thank you for allowing us to participate in the care of this patient. Please do not hesitate to contact us with any additional questions. Diagnosis Orders   1. Vitamin D deficiency     2. Type 2 diabetes mellitus without complication, without long-term current use of insulin (HCC)  insulin detemir (LEVEMIR FLEXTOUCH) 100 UNIT/ML injection pen    glipiZIDE (GLUCOTROL XL) 10 MG extended release tablet   3. IDDM (insulin dependent diabetes mellitus) (HCC)  insulin detemir (LEVEMIR FLEXTOUCH) 100 UNIT/ML injection pen   4. Hyperlipidemia, unspecified hyperlipidemia type     5. Obesity without serious comorbidity, unspecified classification, unspecified obesity type         Bill Cummins MD  Endocrinologist, [facility])   [address] [place] [address], [address] [place] [address],[id number]   Phone: [phone]  Fax: [phone]  --------------------------------------------  An electronic signature was used to authenticate this note. Kamaljit Jackson MD on 7/8/2020 at 10:20 AM  Services were provided through a video synchronous discussion virtually to substitute for in-person clinic visit. Pursuant to the emergency declaration under the [facility], [facility] waiver authority and the [employer] Act, this Virtual  Visit was conducted, with patient's consent, to reduce the patient's risk of exposure to COVID-19 and provide continuity of care.

## 2020-07-10 RX ORDER — PEN NEEDLE, DIABETIC 29 G X1/2"
1 NEEDLE, DISPOSABLE MISCELLANEOUS 2 TIMES DAILY
Qty: 200 EACH | Refills: 3 | Status: SHIPPED | OUTPATIENT
Start: 2020-07-10

## 2020-07-13 ENCOUNTER — E-VISIT (OUTPATIENT)
Dept: FAMILY MEDICINE CLINIC | Age: 45
End: 2020-07-13
Payer: COMMERCIAL

## 2020-07-13 PROCEDURE — 99421 OL DIG E/M SVC 5-10 MIN: CPT | Performed by: FAMILY MEDICINE

## 2020-07-13 RX ORDER — NEOMYCIN SULFATE, POLYMYXIN B SULFATE, BACITRACIN ZINC, HYDROCORTISONE 3.5; 10000; 400; 1 MG/G; [USP'U]/G; [USP'U]/G; MG/G
OINTMENT OPHTHALMIC 2 TIMES DAILY
Qty: 1 TUBE | Refills: 0 | Status: SHIPPED | OUTPATIENT
Start: 2020-07-13 | End: 2020-09-09

## 2020-07-13 RX ORDER — NEOMYCIN SULFATE, POLYMYXIN B SULFATE, BACITRACIN ZINC, HYDROCORTISONE 3.5; 10000; 400; 1 MG/G; [USP'U]/G; [USP'U]/G; MG/G
OINTMENT OPHTHALMIC 2 TIMES DAILY
Qty: 1 TUBE | Refills: 0 | Status: SHIPPED | OUTPATIENT
Start: 2020-07-13 | End: 2020-07-13 | Stop reason: SDUPTHER

## 2020-09-09 RX ORDER — LISINOPRIL 20 MG/1
TABLET ORAL
Qty: 90 TABLET | Refills: 3 | Status: SHIPPED
Start: 2020-09-09 | End: 2021-05-04 | Stop reason: SDUPTHER

## 2020-09-09 RX ORDER — LIRAGLUTIDE 6 MG/ML
1.8 INJECTION SUBCUTANEOUS DAILY
Qty: 9 PEN | Refills: 5 | Status: SHIPPED
Start: 2020-09-09 | End: 2021-09-13 | Stop reason: SDUPTHER

## 2020-09-09 RX ORDER — DEXLANSOPRAZOLE 60 MG/1
CAPSULE, DELAYED RELEASE ORAL
Qty: 90 CAPSULE | Refills: 3 | Status: SHIPPED
Start: 2020-09-09 | End: 2021-05-04 | Stop reason: SDUPTHER

## 2020-09-09 RX ORDER — ROSUVASTATIN CALCIUM 5 MG/1
TABLET, COATED ORAL
Qty: 90 TABLET | Refills: 3 | Status: SHIPPED
Start: 2020-09-09 | End: 2021-05-04 | Stop reason: SDUPTHER

## 2020-09-09 RX ORDER — NEOMYCIN SULFATE, POLYMYXIN B SULFATE, BACITRACIN ZINC, HYDROCORTISONE 3.5; 10000; 400; 1 MG/G; [USP'U]/G; [USP'U]/G; MG/G
OINTMENT OPHTHALMIC
Qty: 3.5 G | Refills: 0 | Status: SHIPPED | OUTPATIENT
Start: 2020-09-09 | End: 2020-10-29

## 2020-09-09 NOTE — TELEPHONE ENCOUNTER
Name of Medication(s) Requested:  Metformin    Pharmacy Requested:   [facility]    Medication(s) pended? [x] Yes  [] No    Last Appointment:  7/8/2020    Future appts:  Future Appointments   Date Time Provider Pedrito Yolanda   11/18/2020  2:40 PM Cole Siu MD [facility]        Does patient need call back?   [] Yes  [x] No

## 2020-09-09 NOTE — TELEPHONE ENCOUNTER
Please Approve or Refuse.   Send to Pharmacy per Pt's Request:      Next Visit Date:  Visit date not found   Last Visit Date: 7/13/2020    Hemoglobin A1C (%)   Date Value   07/02/2020 6.6 (H)   02/27/2020 6.9   09/09/2019 8.5 (H)             ( goal A1C is < 7)   BP Readings from Last 3 Encounters:   02/27/20 138/88   01/13/20 128/81   10/28/19 136/82          (goal 120/80)  BUN   Date Value Ref Range Status   07/02/2020 11 6 - 20 mg/dL Final     CREATININE   Date Value Ref Range Status   07/02/2020 0.8 0.5 - 1.0 mg/dL Final     Potassium   Date Value Ref Range Status   07/02/2020 5.0 3.5 - 5.0 mmol/L Final

## 2020-10-29 ENCOUNTER — OFFICE VISIT (OUTPATIENT)
Dept: PRIMARY CARE CLINIC | Age: 45
End: 2020-10-29
Payer: COMMERCIAL

## 2020-10-29 ENCOUNTER — HOSPITAL ENCOUNTER (OUTPATIENT)
Age: 45
Discharge: HOME | End: 2020-10-31
Payer: COMMERCIAL

## 2020-10-29 VITALS
BODY MASS INDEX: 37.04 KG/M2 | HEART RATE: 85 BPM | DIASTOLIC BLOOD PRESSURE: 84 MMHG | RESPIRATION RATE: 16 BRPM | TEMPERATURE: 97.5 F | HEIGHT: 67 IN | WEIGHT: 236 LBS | OXYGEN SATURATION: 96 % | SYSTOLIC BLOOD PRESSURE: 122 MMHG

## 2020-10-29 PROBLEM — R53.83 FATIGUE: Status: ACTIVE | Noted: 2020-10-29

## 2020-10-29 LAB
ALBUMIN SERPL-MCNC: 4.7 G/DL (ref 3.5–5.2)
ALP BLD-CCNC: 68 U/L (ref 35–104)
ALT SERPL-CCNC: 19 U/L (ref 0–32)
ANION GAP SERPL CALCULATED.3IONS-SCNC: 17 MMOL/L (ref 7–16)
AST SERPL-CCNC: 15 U/L (ref 0–31)
BILIRUB SERPL-MCNC: 0.2 MG/DL (ref 0–1.2)
BUN BLDV-MCNC: 9 MG/DL (ref 6–20)
CALCIUM SERPL-MCNC: 9.8 MG/DL (ref 8.6–10.2)
CHLORIDE BLD-SCNC: 98 MMOL/L (ref 98–107)
CO2: 23 MMOL/L (ref 22–29)
CREAT SERPL-MCNC: 0.7 MG/DL (ref 0.5–1)
FOLATE: >20 NG/ML (ref 4.8–24.2)
GFR AFRICAN AMERICAN: >60
GFR NON-AFRICAN AMERICAN: >60 ML/MIN/1.73
GLUCOSE BLD-MCNC: 109 MG/DL (ref 74–99)
HBA1C MFR BLD: 7.3 % (ref 4–5.6)
HCT VFR BLD CALC: 39.1 % (ref 34–48)
HEMOGLOBIN: 11.6 G/DL (ref 11.5–15.5)
MCH RBC QN AUTO: 23.2 PG (ref 26–35)
MCHC RBC AUTO-ENTMCNC: 29.7 % (ref 32–34.5)
MCV RBC AUTO: 78.4 FL (ref 80–99.9)
PDW BLD-RTO: 18.7 FL (ref 11.5–15)
PLATELET # BLD: 341 E9/L (ref 130–450)
PMV BLD AUTO: 13.1 FL (ref 7–12)
POTASSIUM SERPL-SCNC: 4.5 MMOL/L (ref 3.5–5)
RBC # BLD: 4.99 E12/L (ref 3.5–5.5)
SODIUM BLD-SCNC: 138 MMOL/L (ref 132–146)
T4 FREE: 1.34 NG/DL (ref 0.93–1.7)
TOTAL PROTEIN: 7.8 G/DL (ref 6.4–8.3)
TSH SERPL DL<=0.05 MIU/L-ACNC: 1.07 UIU/ML (ref 0.27–4.2)
VITAMIN B-12: 441 PG/ML (ref 211–946)
WBC # BLD: 12.2 E9/L (ref 4.5–11.5)

## 2020-10-29 PROCEDURE — 99214 OFFICE O/P EST MOD 30 MIN: CPT | Performed by: FAMILY MEDICINE

## 2020-10-29 PROCEDURE — 84443 ASSAY THYROID STIM HORMONE: CPT

## 2020-10-29 PROCEDURE — 80053 COMPREHEN METABOLIC PANEL: CPT

## 2020-10-29 PROCEDURE — 84439 ASSAY OF FREE THYROXINE: CPT

## 2020-10-29 PROCEDURE — 82607 VITAMIN B-12: CPT

## 2020-10-29 PROCEDURE — 36415 COLL VENOUS BLD VENIPUNCTURE: CPT

## 2020-10-29 PROCEDURE — 85027 COMPLETE CBC AUTOMATED: CPT

## 2020-10-29 PROCEDURE — 83036 HEMOGLOBIN GLYCOSYLATED A1C: CPT

## 2020-10-29 PROCEDURE — 82746 ASSAY OF FOLIC ACID SERUM: CPT

## 2020-10-29 ASSESSMENT — ENCOUNTER SYMPTOMS
EYE PAIN: 0
DIARRHEA: 0
RHINORRHEA: 0
SORE THROAT: 0
SHORTNESS OF BREATH: 0
APNEA: 0
SINUS PRESSURE: 0
WHEEZING: 0
NAUSEA: 0
COLOR CHANGE: 0
ABDOMINAL PAIN: 0
COUGH: 0
BLOOD IN STOOL: 0
VOMITING: 0
CHEST TIGHTNESS: 0
EYE ITCHING: 0
BACK PAIN: 0
EYE REDNESS: 0
CONSTIPATION: 0

## 2020-10-29 ASSESSMENT — PATIENT HEALTH QUESTIONNAIRE - PHQ9
SUM OF ALL RESPONSES TO PHQ QUESTIONS 1-9: 0
2. FEELING DOWN, DEPRESSED OR HOPELESS: 0
SUM OF ALL RESPONSES TO PHQ QUESTIONS 1-9: 0
1. LITTLE INTEREST OR PLEASURE IN DOING THINGS: 0
SUM OF ALL RESPONSES TO PHQ9 QUESTIONS 1 & 2: 0
SUM OF ALL RESPONSES TO PHQ QUESTIONS 1-9: 0

## 2020-10-29 NOTE — PROGRESS NOTES
Chief Complaint:     Chief Complaint   Patient presents with    Fatigue     said she has been very tired since june. got an iud put in june went and saw her gyn and was told to see pcp    Diabetes         Fatigue   This is a recurrent problem. The current episode started more than 1 month ago. The problem occurs daily. The problem has been unchanged. Associated symptoms include fatigue. Pertinent negatives include no abdominal pain, arthralgias, chest pain, congestion, coughing, diaphoresis, fever, headaches, myalgias, nausea, neck pain, numbness, rash, sore throat, vertigo, vomiting or weakness. Nothing aggravates the symptoms. She has tried nothing for the symptoms. The treatment provided no relief. Diabetes   She presents for her follow-up diabetic visit. She has type 2 diabetes mellitus. Her disease course has been stable. Pertinent negatives for hypoglycemia include no dizziness, headaches or nervousness/anxiousness. Associated symptoms include fatigue. Pertinent negatives for diabetes include no chest pain and no weakness. There are no hypoglycemic complications. Symptoms are stable. There are no diabetic complications. Pertinent negatives for diabetic complications include no CVA or PVD. Risk factors for coronary artery disease include diabetes mellitus and obesity. Current diabetic treatment includes oral agent (triple therapy). She is compliant with treatment all of the time. Her weight is stable. She is following a generally healthy diet. When asked about meal planning, she reported none. She rarely participates in exercise. There is no change in her home blood glucose trend. An ACE inhibitor/angiotensin II receptor blocker is being taken. She does not see a podiatrist.Eye exam is current. Hypertension   This is a chronic problem. The current episode started more than 1 month ago. The problem is unchanged. The problem is controlled. Associated symptoms include anxiety.  Pertinent negatives include no chest pain, headaches, malaise/fatigue, neck pain, palpitations or shortness of breath. There are no associated agents to hypertension. Risk factors for coronary artery disease include diabetes mellitus, dyslipidemia and obesity. Past treatments include ACE inhibitors. The current treatment provides significant improvement. There are no compliance problems. There is no history of CAD/MI, CVA or PVD. There is no history of a hypertension causing med, pheochromocytoma, renovascular disease, sleep apnea or a thyroid problem.        Patient Active Problem List   Diagnosis    GERD (gastroesophageal reflux disease)    Essential hypertension    Type 2 diabetes mellitus without complication, without long-term current use of insulin (HCC)    Familial hyperlipidemia    Irritable bowel syndrome with diarrhea    Dysfunction of both eustachian tubes    DUB (dysfunctional uterine bleeding)    Fatigue       Past Medical History:   Diagnosis Date    Diabetes mellitus ([address]     Fatty liver disease, nonalcoholic     GERD (gastroesophageal reflux disease)     Heart palpitations     Migraine     PCOS (polycystic ovarian syndrome)     Post partum depression        Past Surgical History:   Procedure Laterality Date     SECTION      DILATION AND CURETTAGE OF UTERUS         Current Outpatient Medications   Medication Sig Dispense Refill    Levonorgestrel (LILETTA, 52 MG, IU) by Intrauterine route      DEXILANT 60 MG CPDR delayed release capsule TAKE 1 CAPSULE DAILY 90 capsule 3    metFORMIN (GLUCOPHAGE) 1000 MG tablet TAKE 1 TABLET TWICE DAILY  WITH MEALS 180 tablet 3    rosuvastatin (CRESTOR) 5 MG tablet TAKE 1 TABLET NIGHTLY 90 tablet 3    lisinopril (PRINIVIL;ZESTRIL) 20 MG tablet TAKE 1 TABLET DAILY 90 tablet 3    blood glucose test strips (ASCENSIA AUTODISC VI;ONE TOUCH ULTRA TEST VI) strip 1 each by In Vitro route 3 times daily 250 each 5    VICTOZA 18 MG/3ML SOPN SC injection Inject 1.8 mg into the skin daily 9 pen 5    butalbital-acetaminophen-caffeine (FIORICET, ESGIC) -40 MG per tablet Take 1 tablet by mouth every 6 hours as needed for Headaches or Migraine 90 tablet 3    Insulin Pen Needle (BD ULTRA-FINE PEN NEEDLES) 29G X 12.7MM MISC 1 each by Does not apply route 2 times daily Bd ultrafine needles to use with victoza pen 200 each 3    insulin detemir (LEVEMIR FLEXTOUCH) 100 UNIT/ML injection pen Inject 25 Units into the skin nightly 15 pen 5    glipiZIDE (GLUCOTROL XL) 10 MG extended release tablet Take 1 tablet by mouth 2 times daily 180 tablet 3    venlafaxine (EFFEXOR XR) 150 MG extended release capsule Take 1 capsule by mouth daily 90 capsule 3    albuterol sulfate  (90 Base) MCG/ACT inhaler Inhale 2 puffs into the lungs 4 times daily as needed for Wheezing 1 Inhaler 1     No current facility-administered medications for this visit. No Known Allergies    Social History     Socioeconomic History    Marital status:      Spouse name: None    Number of children: None    Years of education: None    Highest education level: None   Occupational History     Employer: [employer]    Financial resource strain: None    Food insecurity     Worry: None     Inability: None    Transportation needs     Medical: None     Non-medical: None   Tobacco Use    Smoking status: Former Smoker     Packs/day: 2.00     Years: 10.00     Pack years: 20.00     Types: Cigarettes     Last attempt to quit: 11/9/2016     Years since quitting: 3.9    Smokeless tobacco: Never Used    Tobacco comment: Sometimes smokes cigars   Substance and Sexual Activity    Alcohol use: No     Comment: occ.     Drug use: No    Sexual activity: Yes     Partners: Male   Lifestyle    Physical activity     Days per week: None     Minutes per session: None    Stress: None   Relationships    Social connections     Talks on phone: None     Gets together: None     Attends Spiritism service: reviewed and are negative. /84   Pulse 85   Temp 97.5 °F (36.4 °C)   Resp 16   Ht 5' 7\" (1.702 m)   Wt 236 lb (107 kg)   SpO2 96%   BMI 36.96 kg/m²     Physical Exam  Vitals signs and nursing note reviewed. Constitutional:       General: She is not in acute distress. Appearance: Normal appearance. She is well-developed. HENT:      Head: Normocephalic and atraumatic. Right Ear: Hearing, tympanic membrane and external ear normal. No tenderness. No middle ear effusion. Left Ear: Hearing, tympanic membrane and external ear normal. No tenderness. No middle ear effusion. Nose: Nose normal. No congestion or rhinorrhea. Right Turbinates: Not enlarged. Left Turbinates: Not enlarged. Mouth/Throat:      Mouth: Mucous membranes are moist.      Tongue: No lesions. Pharynx: Oropharynx is clear. No oropharyngeal exudate or posterior oropharyngeal erythema. Eyes:      General: No scleral icterus. Conjunctiva/sclera: Conjunctivae normal.      Pupils: Pupils are equal, round, and reactive to light. Neck:      Musculoskeletal: Normal range of motion and neck supple. No neck rigidity or muscular tenderness. Thyroid: No thyromegaly. Cardiovascular:      Rate and Rhythm: Normal rate and regular rhythm. Heart sounds: Normal heart sounds. No murmur. Pulmonary:      Effort: Pulmonary effort is normal. No respiratory distress. Breath sounds: Normal breath sounds. No wheezing or rales. Abdominal:      General: Bowel sounds are normal. There is no distension. Palpations: Abdomen is soft. Tenderness: There is no abdominal tenderness. Musculoskeletal: Normal range of motion. General: No tenderness. Lymphadenopathy:      Cervical: No cervical adenopathy. Skin:     General: Skin is warm and dry. Findings: No erythema or rash. Neurological:      General: No focal deficit present.       Mental Status: She is alert and oriented to person, place, and time. Cranial Nerves: No cranial nerve deficit. Deep Tendon Reflexes: Reflexes are normal and symmetric. Reflexes normal.   Psychiatric:         Mood and Affect: Mood normal.                                 ASSESSMENT/PLAN:    Patient Active Problem List   Diagnosis    GERD (gastroesophageal reflux disease)    Essential hypertension    Type 2 diabetes mellitus without complication, without long-term current use of insulin ([facility]    Familial hyperlipidemia    Irritable bowel syndrome with diarrhea    Dysfunction of both eustachian tubes    DUB (dysfunctional uterine bleeding)    Fatigue       Corarandolph Bonillapema was seen today for fatigue and diabetes. Diagnoses and all orders for this visit:    Essential hypertension    Type 2 diabetes mellitus without complication, without long-term current use of insulin ([facility])  -     CBC; Future  -     Comprehensive Metabolic Panel; Future  -     TSH without Reflex; Future  -     Hemoglobin A1C; Future    Fatigue, unspecified type  -     TSH without Reflex; Future  -     T4, Free; Future  -     Vitamin B12 & Folate; Future    FELECIA (obstructive sleep apnea)  -     Sleep Study with PAP Titration; Future          Return if symptoms worsen or fail to improve. I spent 30 minutes with this patient. I spent greater than 50% of the time counseling this patient.         Veronica Syed DO  10/29/2020  8:55 AM

## 2020-11-11 DIAGNOSIS — D72.829 LEUKOCYTOSIS, UNSPECIFIED TYPE: ICD-10-CM

## 2020-11-11 LAB
BASOPHILS ABSOLUTE: 0.06 E9/L (ref 0–0.2)
BASOPHILS RELATIVE PERCENT: 0.6 % (ref 0–2)
EOSINOPHILS ABSOLUTE: 0.18 E9/L (ref 0.05–0.5)
EOSINOPHILS RELATIVE PERCENT: 1.7 % (ref 0–6)
HCT VFR BLD CALC: 39.1 % (ref 34–48)
HEMOGLOBIN: 11.7 G/DL (ref 11.5–15.5)
IMMATURE GRANULOCYTES #: 0.05 E9/L
IMMATURE GRANULOCYTES %: 0.5 % (ref 0–5)
LYMPHOCYTES ABSOLUTE: 2.75 E9/L (ref 1.5–4)
LYMPHOCYTES RELATIVE PERCENT: 26.4 % (ref 20–42)
MCH RBC QN AUTO: 23.6 PG (ref 26–35)
MCHC RBC AUTO-ENTMCNC: 29.9 % (ref 32–34.5)
MCV RBC AUTO: 78.8 FL (ref 80–99.9)
MONOCYTES ABSOLUTE: 0.76 E9/L (ref 0.1–0.95)
MONOCYTES RELATIVE PERCENT: 7.3 % (ref 2–12)
NEUTROPHILS ABSOLUTE: 6.6 E9/L (ref 1.8–7.3)
NEUTROPHILS RELATIVE PERCENT: 63.5 % (ref 43–80)
PDW BLD-RTO: 19 FL (ref 11.5–15)
PLATELET # BLD: 341 E9/L (ref 130–450)
PMV BLD AUTO: 13.1 FL (ref 7–12)
RBC # BLD: 4.96 E12/L (ref 3.5–5.5)
WBC # BLD: 10.4 E9/L (ref 4.5–11.5)

## 2020-11-18 ENCOUNTER — OFFICE VISIT (OUTPATIENT)
Dept: ENDOCRINOLOGY | Age: 45
End: 2020-11-18
Payer: COMMERCIAL

## 2020-11-18 VITALS
TEMPERATURE: 98 F | HEART RATE: 104 BPM | SYSTOLIC BLOOD PRESSURE: 122 MMHG | WEIGHT: 231 LBS | DIASTOLIC BLOOD PRESSURE: 72 MMHG | OXYGEN SATURATION: 98 % | BODY MASS INDEX: 36.18 KG/M2

## 2020-11-18 PROCEDURE — 3051F HG A1C>EQUAL 7.0%<8.0%: CPT | Performed by: INTERNAL MEDICINE

## 2020-11-18 PROCEDURE — 99214 OFFICE O/P EST MOD 30 MIN: CPT | Performed by: INTERNAL MEDICINE

## 2020-11-18 RX ORDER — INSULIN DETEMIR 100 [IU]/ML
30 INJECTION, SOLUTION SUBCUTANEOUS NIGHTLY
Qty: 15 PEN | Refills: 5
Start: 2020-11-18 | End: 2021-05-20

## 2020-11-18 NOTE — PROGRESS NOTES
[address] [place] [address] Department of Endocrinology Diabetes and Metabolism   [address] [facility] [address]   Phone: [phone]  Fax: [phone]\    Date of Service: 2020    Primary Care Physician: Paul Spain DO  Provider: Young Cuevas MD     Reason for the visit:  DM type 2     History of Present Illness: The history is provided by the patient. No  was used. Accuracy of the patient data is excellent. Denise Alford is a very pleasant 39 y.o. female with past medical history of PCOS, HTN and Obesity seen today for follow up visit     Denise Alford was diagnosed with diabetes early 35s.  Started as gestational diabetes and she is currently on Levemir 30 units at bedtime, Glipizide ER 10 mg BID, Metformin er 1000 daily, Victoza 1.8 mg daily    Over the past 1-3  times a day and all readings at goal   Most recent A1c summarized below  Lab Results   Component Value Date    LABA1C 7.3 10/29/2020    LABA1C 6.6 2020    LABA1C 6.9 2020      Patient has had no hypoglycemic episodes   Very good with following diabetes diet and encouraged   I reviewed current medications and the patient has no issues with diabetes medications  Denise Alford is up to date with eye exam and denied any history of diabetic retinopathy, retinopathy   The patient performs her own feet care and doesn't see podiatry   Microvascular complications:  No Retinopathy, Nephropathy or Neuropathy   Macrovascular complications: no CAD, PVD, or Stroke  On statin     PAST MEDICAL HISTORY   Past Medical History:   Diagnosis Date    Diabetes mellitus ([address]     Fatty liver disease, nonalcoholic     GERD (gastroesophageal reflux disease)     Heart palpitations     Migraine     PCOS (polycystic ovarian syndrome)     Post partum depression      PAST SURGICAL HISTORY   Past Surgical History:   Procedure Laterality Date     SECTION      DILATION AND CURETTAGE OF UTERUS       SOCIAL HISTORY   Tobacco:   reports that she quit smoking about 4 years ago. Her smoking use included cigarettes. She has a 20.00 pack-year smoking history. She has never used smokeless tobacco.  Alcol:   reports no history of alcohol use. Illicit Drugs:   reports no history of drug use.     FAMILY HISTORY   Family History   Problem Relation Age of Onset    High Blood Pressure Mother     High Blood Pressure Father     Diabetes Father     Cancer Maternal Grandmother         unknown if it was uterine, ovarian or cervix     Diabetes Paternal Grandfather      ALLERGIES AND DRUG REACTIONS   No Known Allergies    CURRENT MEDICATIONS   Current Outpatient Medications   Medication Sig Dispense Refill    insulin detemir (LEVEMIR FLEXTOUCH) 100 UNIT/ML injection pen Inject 30 Units into the skin nightly 15 pen 5    Levonorgestrel (LILETTA, 52 MG, IU) by Intrauterine route      DEXILANT 60 MG CPDR delayed release capsule TAKE 1 CAPSULE DAILY 90 capsule 3    metFORMIN (GLUCOPHAGE) 1000 MG tablet TAKE 1 TABLET TWICE DAILY  WITH MEALS 180 tablet 3    rosuvastatin (CRESTOR) 5 MG tablet TAKE 1 TABLET NIGHTLY 90 tablet 3    lisinopril (PRINIVIL;ZESTRIL) 20 MG tablet TAKE 1 TABLET DAILY 90 tablet 3    blood glucose test strips (ASCENSIA AUTODISC VI;ONE TOUCH ULTRA TEST VI) strip 1 each by In Vitro route 3 times daily 250 each 5    VICTOZA 18 MG/3ML SOPN SC injection Inject 1.8 mg into the skin daily 9 pen 5    butalbital-acetaminophen-caffeine (FIORICET, ESGIC) -40 MG per tablet Take 1 tablet by mouth every 6 hours as needed for Headaches or Migraine 90 tablet 3    Insulin Pen Needle (BD ULTRA-FINE PEN NEEDLES) 29G X 12.7MM MISC 1 each by Does not apply route 2 times daily Bd ultrafine needles to use with victoza pen 200 each 3    glipiZIDE (GLUCOTROL XL) 10 MG extended release tablet Take 1 tablet by mouth 2 times daily 180 tablet 3    venlafaxine (EFFEXOR XR) 150 MG extended release capsule Take 1 capsule by mouth daily 90 capsule 3    albuterol sulfate  (90 Base) MCG/ACT inhaler Inhale 2 puffs into the lungs 4 times daily as needed for Wheezing 1 Inhaler 1     No current facility-administered medications for this visit. Review of Systems  Constitutional: No fever, no chills, no diaphoresis, no generalized weakness. HEENT: No blurred vision, No sore throat, no ear pain, no hair loss  Neck: denied any neck swelling, difficulty swallowing,   Cardio-pulmonary: No CP, SOB or palpitation, No orthopnea or PND. No cough or wheezing. GI: No N/V/D, no constipation, No abdominal pain, no melena or hematochezia   : Denied any dysuria, hematuria, flank pain, discharge, or incontinence. Skin: denied any rash, ulcer, Hirsute, or hyperpigmentation. MSK: denied any joint deformity, joint pain/swelling, muscle pain, or back pain. Neuro: no numbness, no tingling, no weakness, _    OBJECTIVE    /72   Pulse 104   Temp 98 °F (36.7 °C)   Wt 231 lb (104.8 kg)   SpO2 98%   BMI 36.18 kg/m²   BP Readings from Last 4 Encounters:   11/18/20 122/72   10/29/20 122/84   02/27/20 138/88   01/13/20 128/81     Wt Readings from Last 6 Encounters:   11/18/20 231 lb (104.8 kg)   10/29/20 236 lb (107 kg)   02/27/20 221 lb (100.2 kg)   01/13/20 223 lb 6.4 oz (101.3 kg)   10/28/19 230 lb 9.6 oz (104.6 kg)   09/09/19 235 lb 3.2 oz (106.7 kg)     Physical examination:  General: awake alert, oriented x3, no abnormal position or movements. HEENT: normocephalic non traumatic, no exophthalmos   Neck: supple, no LN enlargement, no thyromegaly, no thyroid tenderness, no JVD. Pulm: Clear equal air entry no added sounds, no wheezing or rhonchi    CVS: S1 + S2, no murmur, no heave. Dorsalis pedis pulse palpable   Abd: soft lax, no tenderness, no organomegaly, audible bowel sounds. Skin: warm, no lesions, no rash. no Ulcers, No acanthosis nigricans   Neuro: CN intact, sensation present bilateral , muscle power normal  Psych: normal mood, and affect      Review of Laboratory Data:  I personally reviewed the following lab:  Lab Results   Component Value Date/Time    WBC 10.4 11/11/2020 08:21 AM    RBC 4.96 11/11/2020 08:21 AM    HGB 11.7 11/11/2020 08:21 AM    HCT 39.1 11/11/2020 08:21 AM    MCV 78.8 (L) 11/11/2020 08:21 AM    MCH 23.6 (L) 11/11/2020 08:21 AM    MCHC 29.9 (L) 11/11/2020 08:21 AM    RDW 19.0 (H) 11/11/2020 08:21 AM     11/11/2020 08:21 AM    MPV 13.1 (H) 11/11/2020 08:21 AM      Lab Results   Component Value Date/Time     10/29/2020 08:59 AM    K 4.5 10/29/2020 08:59 AM    CO2 23 10/29/2020 08:59 AM    BUN 9 10/29/2020 08:59 AM    CREATININE 0.7 10/29/2020 08:59 AM    CALCIUM 9.8 10/29/2020 08:59 AM    LABGLOM >60 10/29/2020 08:59 AM    GFRAA >60 10/29/2020 08:59 AM      Lab Results   Component Value Date/Time    TSH 1.070 10/29/2020 08:59 AM    T4FREE 1.34 10/29/2020 08:59 AM     Lab Results   Component Value Date    LABA1C 7.3 10/29/2020    GLUCOSE 109 10/29/2020    GLUCOSE 124 02/01/2012    MALBCR - 07/02/2020    LABMICR <12.0 07/02/2020    LABCREA 104 07/02/2020     Lab Results   Component Value Date    LABA1C 7.3 10/29/2020    LABA1C 6.6 07/02/2020    LABA1C 6.9 02/27/2020     Lab Results   Component Value Date    CHOL 110 07/02/2020    CHOL 157 09/09/2019    TRIG 155 07/02/2020    TRIG 223 09/09/2019    HDL 32 07/02/2020    HDL 36 09/09/2019     Lab Results   Component Value Date    VITD25 94 07/02/2020         ASSESSMENT & RECOMMENDATIONS   Kenna Gordon, a 39 y.o.-old female seen in for the following issues     Diabetes Mellitus Type 2     · Under good control   · Continue levemir 30 units at bedtime, Metformin er 1000 BID, Victoza 1.8 mg daily, Glipizide ER 10 mg BID  · Continue checking blood sugars 1-3 times a day  · Discussed with patient A1c and blood sugar goals   · Patient up to date with the routine diabetes maintenance and prevention     H/o Dietary noncompliance   Improved after seeing diabetes educator HLD  · At goal  · Continue Crestor 5 mg daily     Obesity   Discussed lifestyle changes including diet and exercise with patient in depth. Also discussed with patient cardiovascular risk associated with obesity   On GLP-1 agonist     VitD deficiency   · Level was 94  · Will change vitD from 5000 to 2000 U/day/    Return in about 4 months (around 3/18/2021) for DM type 2 . The above issues were reviewed with the patient who understood and agreed with the plan. Thank you for allowing us to participate in the care of this patient. Please do not hesitate to contact us with any additional questions. Diagnosis Orders   1. Vitamin D deficiency     2. Type 2 diabetes mellitus without complication, without long-term current use of insulin ([facility])  insulin detemir (LEVEMIR FLEXTOUCH) 100 UNIT/ML injection pen   3. Type 2 diabetes mellitus without complication, with long-term current use of insulin (HCC)  insulin detemir (LEVEMIR FLEXTOUCH) 100 UNIT/ML injection pen   4. Hyperlipidemia, unspecified hyperlipidemia type     5. Obesity without serious comorbidity, unspecified classification, unspecified obesity type         Ino Hodges MD  Endocrinologist, [facility])   [address] [place] [address], [address] [place] [address],[address] [id number]   Phone: [phone]  Fax: [phone]  --------------------------------------------  An electronic signature was used to authenticate this note.  Katey Gonzalez MD on 11/18/2020 at 4:10 PM

## 2020-11-18 NOTE — LETTER
[address] [place] [address] Department of Endocrinology Diabetes and Metabolism   [address] [place] [address]   Phone: [phone]  Fax: [phone]      Provider: Opal Escamilla MD  Primary Care Physician: Andre Olivas DO   Referring Provider: No ref. provider found    Patient: Penny Humphreys  YOB: 1975  Date of Visit: 11/18/2020      Dear Dr. Andre Olivas DO   I had the pleasure of seeing your patient Penny Humphreys today at endocrine clinic for follow up visit and I enclosed a copy of the office visit completed today. Thank you very much for asking us to participate in the care of this very pleasant patient. Please don't hesitate to call if there are any further questions or concerns. Sincerely   Opal Escamilla MD  Endocrinologist, [address] [address]   Phone: [phone]  Fax: [phone]      [address] [place] [address] Department of Endocrinology Diabetes and Metabolism   [address] [place] [address]   Phone: [phone]  Fax: [phone]\    Date of Service: 11/18/2020    Primary Care Physician: Andre Olivas DO  Provider: Opal Escamilla MD     Reason for the visit:  DM type 2     History of Present Illness: The history is provided by the patient. No  was used. Accuracy of the patient data is excellent. Penny Humphreys is a very pleasant 39 y.o. female with past medical history of PCOS, HTN and Obesity seen today for follow up visit     Penny Humphreys was diagnosed with diabetes early 35s.  Started as gestational diabetes and she is currently on Levemir 30 units at bedtime, Glipizide ER 10 mg BID, Metformin er 1000 daily, Victoza 1.8 mg daily    Over the past 1-3  times a day and all readings at goal   Most recent A1c summarized below  Lab Results   Component Value Date    LABA1C 7.3 10/29/2020    LABA1C 6.6 07/02/2020    LABA1C 6.9 02/27/2020      Patient has had no hypoglycemic episodes Very good with following diabetes diet and encouraged   I reviewed current medications and the patient has no issues with diabetes medications  Eber Carlson is up to date with eye exam and denied any history of diabetic retinopathy, retinopathy   The patient performs her own feet care and doesn't see podiatry   Microvascular complications:  No Retinopathy, Nephropathy or Neuropathy   Macrovascular complications: no CAD, PVD, or Stroke  On statin     PAST MEDICAL HISTORY   Past Medical History:   Diagnosis Date    Diabetes mellitus ([address]     Fatty liver disease, nonalcoholic     GERD (gastroesophageal reflux disease)     Heart palpitations     Migraine     PCOS (polycystic ovarian syndrome)     Post partum depression      PAST SURGICAL HISTORY   Past Surgical History:   Procedure Laterality Date     SECTION      DILATION AND CURETTAGE OF UTERUS       SOCIAL HISTORY   Tobacco:   reports that she quit smoking about 4 years ago. Her smoking use included cigarettes. She has a 20.00 pack-year smoking history. She has never used smokeless tobacco.  Alcol:   reports no history of alcohol use. Illicit Drugs:   reports no history of drug use.     FAMILY HISTORY   Family History   Problem Relation Age of Onset    High Blood Pressure Mother     High Blood Pressure Father     Diabetes Father     Cancer Maternal Grandmother         unknown if it was uterine, ovarian or cervix     Diabetes Paternal Grandfather      ALLERGIES AND DRUG REACTIONS   No Known Allergies    CURRENT MEDICATIONS   Current Outpatient Medications   Medication Sig Dispense Refill    insulin detemir (LEVEMIR FLEXTOUCH) 100 UNIT/ML injection pen Inject 30 Units into the skin nightly 15 pen 5    Levonorgestrel (LILETTA, 52 MG, IU) by Intrauterine route      DEXILANT 60 MG CPDR delayed release capsule TAKE 1 CAPSULE DAILY 90 capsule 3    metFORMIN (GLUCOPHAGE) 1000 MG tablet TAKE 1 TABLET TWICE DAILY  WITH MEALS 180 tablet 3  rosuvastatin (CRESTOR) 5 MG tablet TAKE 1 TABLET NIGHTLY 90 tablet 3    lisinopril (PRINIVIL;ZESTRIL) 20 MG tablet TAKE 1 TABLET DAILY 90 tablet 3    blood glucose test strips (ASCENSIA AUTODISC VI;ONE TOUCH ULTRA TEST VI) strip 1 each by In Vitro route 3 times daily 250 each 5    VICTOZA 18 MG/3ML SOPN SC injection Inject 1.8 mg into the skin daily 9 pen 5    butalbital-acetaminophen-caffeine (FIORICET, ESGIC) -40 MG per tablet Take 1 tablet by mouth every 6 hours as needed for Headaches or Migraine 90 tablet 3    Insulin Pen Needle (BD ULTRA-FINE PEN NEEDLES) 29G X 12.7MM MISC 1 each by Does not apply route 2 times daily Bd ultrafine needles to use with victoza pen 200 each 3    glipiZIDE (GLUCOTROL XL) 10 MG extended release tablet Take 1 tablet by mouth 2 times daily 180 tablet 3    venlafaxine (EFFEXOR XR) 150 MG extended release capsule Take 1 capsule by mouth daily 90 capsule 3    albuterol sulfate  (90 Base) MCG/ACT inhaler Inhale 2 puffs into the lungs 4 times daily as needed for Wheezing 1 Inhaler 1     No current facility-administered medications for this visit. Review of Systems  Constitutional: No fever, no chills, no diaphoresis, no generalized weakness. HEENT: No blurred vision, No sore throat, no ear pain, no hair loss  Neck: denied any neck swelling, difficulty swallowing,   Cardio-pulmonary: No CP, SOB or palpitation, No orthopnea or PND. No cough or wheezing. GI: No N/V/D, no constipation, No abdominal pain, no melena or hematochezia   : Denied any dysuria, hematuria, flank pain, discharge, or incontinence. Skin: denied any rash, ulcer, Hirsute, or hyperpigmentation. MSK: denied any joint deformity, joint pain/swelling, muscle pain, or back pain.   Neuro: no numbness, no tingling, no weakness, _    OBJECTIVE    /72   Pulse 104   Temp 98 °F (36.7 °C)   Wt 231 lb (104.8 kg)   SpO2 98%   BMI 36.18 kg/m²   BP Readings from Last 4 Encounters: 11/18/20 122/72   10/29/20 122/84   02/27/20 138/88   01/13/20 128/81     Wt Readings from Last 6 Encounters:   11/18/20 231 lb (104.8 kg)   10/29/20 236 lb (107 kg)   02/27/20 221 lb (100.2 kg)   01/13/20 223 lb 6.4 oz (101.3 kg)   10/28/19 230 lb 9.6 oz (104.6 kg)   09/09/19 235 lb 3.2 oz (106.7 kg)     Physical examination:  General: awake alert, oriented x3, no abnormal position or movements. HEENT: normocephalic non traumatic, no exophthalmos   Neck: supple, no LN enlargement, no thyromegaly, no thyroid tenderness, no JVD. Pulm: Clear equal air entry no added sounds, no wheezing or rhonchi    CVS: S1 + S2, no murmur, no heave. Dorsalis pedis pulse palpable   Abd: soft lax, no tenderness, no organomegaly, audible bowel sounds. Skin: warm, no lesions, no rash. no Ulcers, No acanthosis nigricans   Neuro: CN intact, sensation present bilateral , muscle power normal  Psych: normal mood, and affect      Review of Laboratory Data:  I personally reviewed the following lab:  Lab Results   Component Value Date/Time    WBC 10.4 11/11/2020 08:21 AM    RBC 4.96 11/11/2020 08:21 AM    HGB 11.7 11/11/2020 08:21 AM    HCT 39.1 11/11/2020 08:21 AM    MCV 78.8 (L) 11/11/2020 08:21 AM    MCH 23.6 (L) 11/11/2020 08:21 AM    MCHC 29.9 (L) 11/11/2020 08:21 AM    RDW 19.0 (H) 11/11/2020 08:21 AM     11/11/2020 08:21 AM    MPV 13.1 (H) 11/11/2020 08:21 AM      Lab Results   Component Value Date/Time     10/29/2020 08:59 AM    K 4.5 10/29/2020 08:59 AM    CO2 23 10/29/2020 08:59 AM    BUN 9 10/29/2020 08:59 AM    CREATININE 0.7 10/29/2020 08:59 AM    CALCIUM 9.8 10/29/2020 08:59 AM    LABGLOM >60 10/29/2020 08:59 AM    GFRAA >60 10/29/2020 08:59 AM      Lab Results   Component Value Date/Time    TSH 1.070 10/29/2020 08:59 AM    T4FREE 1.34 10/29/2020 08:59 AM     Lab Results   Component Value Date    LABA1C 7.3 10/29/2020    GLUCOSE 109 10/29/2020    GLUCOSE 124 02/01/2012    MALBCR - 07/02/2020 LABMICR <12.0 07/02/2020    LABCREA 104 07/02/2020     Lab Results   Component Value Date    LABA1C 7.3 10/29/2020    LABA1C 6.6 07/02/2020    LABA1C 6.9 02/27/2020     Lab Results   Component Value Date    CHOL 110 07/02/2020    CHOL 157 09/09/2019    TRIG 155 07/02/2020    TRIG 223 09/09/2019    HDL 32 07/02/2020    HDL 36 09/09/2019     Lab Results   Component Value Date    VITD25 94 07/02/2020         ASSESSMENT & RECOMMENDATIONS   Denise Alford, a 39 y.o.-old female seen in for the following issues     Diabetes Mellitus Type 2     · Under good control   · Continue levemir 30 units at bedtime, Metformin er 1000 BID, Victoza 1.8 mg daily, Glipizide ER 10 mg BID  · Continue checking blood sugars 1-3 times a day  · Discussed with patient A1c and blood sugar goals   · Patient up to date with the routine diabetes maintenance and prevention     H/o Dietary noncompliance  ? Improved after seeing diabetes educator     HLD  · At goal  · Continue Crestor 5 mg daily     Obesity  ? Discussed lifestyle changes including diet and exercise with patient in depth. Also discussed with patient cardiovascular risk associated with obesity  ? On GLP-1 agonist     VitD deficiency   · Level was 94  · Will change vitD from 5000 to 2000 U/day/    Return in about 4 months (around 3/18/2021) for DM type 2 . The above issues were reviewed with the patient who understood and agreed with the plan. Thank you for allowing us to participate in the care of this patient. Please do not hesitate to contact us with any additional questions. Diagnosis Orders   1. Vitamin D deficiency     2. Type 2 diabetes mellitus without complication, without long-term current use of insulin ([facility]CC)  insulin detemir (LEVEMIR FLEXTOUCH) 100 UNIT/ML injection pen   3.  Type 2 diabetes mellitus without complication, with long-term current use of insulin ([facility])  insulin detemir (LEVEMIR FLEXTOUCH) 100 UNIT/ML injection pen 4. Hyperlipidemia, unspecified hyperlipidemia type     5. Obesity without serious comorbidity, unspecified classification, unspecified obesity type         Melina Hodgkins MD  Endocrinologist, [facility])   [address] [place] [address], [address] [place] [address],[address] [address]   Phone: [phone]  Fax: [phone]  --------------------------------------------  An electronic signature was used to authenticate this note.  Divya David MD on 11/18/2020 at 4:10 PM

## 2020-12-10 DIAGNOSIS — G47.33 OBSTRUCTIVE SLEEP APNEA (ADULT) (PEDIATRIC): Primary | ICD-10-CM

## 2020-12-18 ENCOUNTER — HOSPITAL ENCOUNTER (OUTPATIENT)
Age: 45
Discharge: HOME | End: 2020-12-20
Payer: COMMERCIAL

## 2020-12-18 PROCEDURE — U0003 INFECTIOUS AGENT DETECTION BY NUCLEIC ACID (DNA OR RNA); SEVERE ACUTE RESPIRATORY SYNDROME CORONAVIRUS 2 (SARS-COV-2) (CORONAVIRUS DISEASE [COVID-19]), AMPLIFIED PROBE TECHNIQUE, MAKING USE OF HIGH THROUGHPUT TECHNOLOGIES AS DESCRIBED BY CMS-2020-01-R: HCPCS

## 2020-12-19 LAB
SARS-COV-2: NOT DETECTED
SOURCE: NORMAL

## 2021-01-12 ENCOUNTER — OFFICE VISIT (OUTPATIENT)
Dept: PRIMARY CARE CLINIC | Age: 46
End: 2021-01-12
Payer: COMMERCIAL

## 2021-01-12 VITALS
TEMPERATURE: 97.7 F | HEIGHT: 67 IN | DIASTOLIC BLOOD PRESSURE: 90 MMHG | RESPIRATION RATE: 18 BRPM | BODY MASS INDEX: 36.26 KG/M2 | OXYGEN SATURATION: 95 % | HEART RATE: 90 BPM | WEIGHT: 231 LBS | SYSTOLIC BLOOD PRESSURE: 130 MMHG

## 2021-01-12 DIAGNOSIS — Z20.822 ENCOUNTER FOR LABORATORY TESTING FOR COVID-19 VIRUS: ICD-10-CM

## 2021-01-12 DIAGNOSIS — R11.0 NAUSEA: ICD-10-CM

## 2021-01-12 DIAGNOSIS — J40 BRONCHITIS: ICD-10-CM

## 2021-01-12 DIAGNOSIS — J02.9 PHARYNGITIS, UNSPECIFIED ETIOLOGY: ICD-10-CM

## 2021-01-12 DIAGNOSIS — Z20.822 ENCOUNTER FOR LABORATORY TESTING FOR COVID-19 VIRUS: Primary | ICD-10-CM

## 2021-01-12 LAB
Lab: NORMAL
QC PASS/FAIL: NORMAL
SARS-COV-2, POC: NORMAL

## 2021-01-12 PROCEDURE — 87426 SARSCOV CORONAVIRUS AG IA: CPT | Performed by: CLINICAL NURSE SPECIALIST

## 2021-01-12 PROCEDURE — 99203 OFFICE O/P NEW LOW 30 MIN: CPT | Performed by: CLINICAL NURSE SPECIALIST

## 2021-01-12 RX ORDER — DOXYCYCLINE HYCLATE 100 MG
100 TABLET ORAL 2 TIMES DAILY
Qty: 20 TABLET | Refills: 0 | Status: SHIPPED | OUTPATIENT
Start: 2021-01-12 | End: 2021-01-22

## 2021-01-12 RX ORDER — METHYLPREDNISOLONE 4 MG/1
TABLET ORAL
Qty: 21 TABLET | Refills: 0 | Status: SHIPPED | OUTPATIENT
Start: 2021-01-12 | End: 2021-01-18

## 2021-01-12 RX ORDER — ALBUTEROL SULFATE 90 UG/1
2 AEROSOL, METERED RESPIRATORY (INHALATION) EVERY 4 HOURS PRN
Qty: 1 INHALER | Refills: 0 | Status: SHIPPED | OUTPATIENT
Start: 2021-01-12

## 2021-01-12 RX ORDER — ONDANSETRON 4 MG/1
4 TABLET, ORALLY DISINTEGRATING ORAL 3 TIMES DAILY PRN
Qty: 21 TABLET | Refills: 0 | Status: SHIPPED
Start: 2021-01-12 | End: 2021-04-10

## 2021-01-12 NOTE — PROGRESS NOTES
Chief Complaint   Nausea (sx x 3 days), Diarrhea, Chills, Sweats, Fatigue, Generalized Body Aches, Fever (last night 99.2, pt took ibuprofen), Headache, Cough, and Covid Testing      History of Present Illness   Source of history provided by: patient. Braydon Shell is a 39 y.o. old female who has a past medical history of:   Past Medical History:   Diagnosis Date    Diabetes mellitus ([address]     Fatty liver disease, nonalcoholic     GERD (gastroesophageal reflux disease)     Heart palpitations     Migraine     PCOS (polycystic ovarian syndrome)     Post partum depression     Presents to the flu clinic for evaluation of nausea, diarrhea, chills, sweats, body aches, fever, headache, dry cough, chest heaviness, fatigue x 3 days. States symptoms have been same since onset. Denies any  dyspnea, LE edema, abdominal pain, vomiting or rash. Has been taking ibuprofen with some symptomatic relief. No history of international travel in the past 14 days. Positive contact with individuals with known COVID-19 infection or under investigation for COVID-19 infection. History of asthma. Current hx of tobacco use. ROS   Pertinent positives and negatives are stated within HPI, all other systems reviewed and are negative. Surgical History:  has a past surgical history that includes  section and Dilation and curettage of uterus. Social History:  reports that she quit smoking about 4 years ago. Her smoking use included cigarettes. She has a 20.00 pack-year smoking history. She has never used smokeless tobacco. She reports that she does not drink alcohol or use drugs. Family History: family history includes Cancer in her maternal grandmother; Diabetes in her father and paternal grandfather; High Blood Pressure in her father and mother. Allergies: Patient has no known allergies.     Physical Exam VS:  BP (!) 130/90   Pulse 90   Temp 97.7 °F (36.5 °C) (Oral)   Resp 18   Ht 5' 7\" (1.702 m)   Wt 231 lb (104.8 kg)   LMP 12/17/2020 (Approximate)   SpO2 95%   BMI 36.18 kg/m²    Oxygen Saturation Interpretation: Normal.    Constitutional:  Alert, development consistent with age. NAD. Head:  NC/NT. Airway patent. EYES: Red injection to sclera of eyes. Ears: TMs intact bilaterally. Erythema to bilateral ear canals without exudate or swelling bilaterally. Nose: turbinates with mild erythema, no lesions. Mouth: Posterior pharynx with mild erythema and clear postnasal drip. Right sided tonsillar hypertrophy and clear exudate. Neck:  Normal ROM. Supple. Positive anterior cervical adenopathy noted. Lungs: Diminished lung sounds with bronchitis like cough  CV:  Regular rate and rhythm, normal heart sounds, without pathological murmurs, ectopy, gallops, or rubs. Abdomen: soft, nontender, NABS x 4, no firm or pulsatile masses, no organomegaly, no rebound or guarding. Skin:  Normal turgor. Warm, dry, without visible rash. Lymphatic: No lymphangitis or adenopathy noted unless otherwise specified. Neurological:  Oriented. Motor functions intact. Lab / Imaging Results   (All laboratory and radiology results have been personally reviewed by myself)  Labs:  Results for orders placed or performed in visit on 01/12/21   POCT COVID-19, Antigen   Result Value Ref Range    SARS-COV-2, POC Not-Detected Not Detected    Lot Number [id number]     QC Pass/Fail pass        Imaging: All Radiology results interpreted by Radiologist unless otherwise noted. No results found. Medical Decision Making   Pt non-toxic, in no apparent distress and stable at time of discharge. Assessment/Plan   Sary Joseph was seen today for nausea, diarrhea, chills, sweats, fatigue, generalized body aches, fever, headache, cough and covid testing.     Diagnoses and all orders for this visit: This visit was provided as a focused evaluation during the COVID -19 pandemic/national emergency. A comprehensive review of all previous patient history and testing was not conducted. Pertinent findings were elicited during the visit.

## 2021-01-12 NOTE — LETTER
[address] [place] [address]  Phone: [phone]  Fax: [phone]    TONI Estevez CNP        January 12, 2021     Patient: Natalie Canchola   YOB: 1975   Date of Visit: 1/12/2021       To Whom it May Concern:    Patricia Mitchell was seen in my clinic on 1/12/2021. She was covid tested today and results are pending. She is to quarantine until results are available and if positive she should quarantine for 10 days from initial symptoms, which was January 10,2021. If you have any questions or concerns, please don't hesitate to call.     Sincerely,         TONI Estevez CNP

## 2021-01-14 ENCOUNTER — TELEPHONE (OUTPATIENT)
Dept: ENDOCRINOLOGY | Age: 46
End: 2021-01-14

## 2021-01-14 DIAGNOSIS — E11.9 TYPE 2 DIABETES MELLITUS WITHOUT COMPLICATION, WITHOUT LONG-TERM CURRENT USE OF INSULIN (HCC): Primary | ICD-10-CM

## 2021-01-14 LAB
SARS-COV-2: NOT DETECTED
SOURCE: NORMAL

## 2021-01-14 RX ORDER — PEN NEEDLE, DIABETIC 32GX 5/32"
NEEDLE, DISPOSABLE MISCELLANEOUS
Qty: 250 EACH | Refills: 5 | Status: SHIPPED
Start: 2021-01-14 | End: 2022-06-27 | Stop reason: SDUPTHER

## 2021-01-14 RX ORDER — INSULIN LISPRO 100 [IU]/ML
INJECTION, SOLUTION INTRAVENOUS; SUBCUTANEOUS
Qty: 8 PEN | Refills: 3 | Status: SHIPPED
Start: 2021-01-14 | End: 2021-04-27 | Stop reason: ALTCHOICE

## 2021-01-15 NOTE — TELEPHONE ENCOUNTER
Will add as needed sliding scale to her current DM regimen     Blood sugar 150-200: take  2 Units of Humalog  Blood sugar 201-250 :take 4 Units of Humalog  Blood sugar 251 - 300: take 6 Units of Humalog  Blood sugar 301 - 350 : take 8 Units of Humalog  Blood sugar >350 : take 10 Units of Humalog

## 2021-02-16 ENCOUNTER — E-VISIT (OUTPATIENT)
Dept: PRIMARY CARE CLINIC | Age: 46
End: 2021-02-16
Payer: COMMERCIAL

## 2021-02-16 DIAGNOSIS — J01.00 ACUTE NON-RECURRENT MAXILLARY SINUSITIS: ICD-10-CM

## 2021-02-16 DIAGNOSIS — H69.83 DYSFUNCTION OF BOTH EUSTACHIAN TUBES: Primary | ICD-10-CM

## 2021-02-16 PROBLEM — J40 BRONCHITIS: Status: RESOLVED | Noted: 2021-01-12 | Resolved: 2021-02-16

## 2021-02-16 PROBLEM — R11.0 NAUSEA: Status: RESOLVED | Noted: 2021-01-12 | Resolved: 2021-02-16

## 2021-02-16 PROBLEM — J02.9 PHARYNGITIS: Status: RESOLVED | Noted: 2021-01-12 | Resolved: 2021-02-16

## 2021-02-16 PROCEDURE — 99422 OL DIG E/M SVC 11-20 MIN: CPT | Performed by: FAMILY MEDICINE

## 2021-02-16 RX ORDER — CEFDINIR 300 MG/1
300 CAPSULE ORAL 2 TIMES DAILY
Qty: 20 CAPSULE | Refills: 0 | Status: SHIPPED | OUTPATIENT
Start: 2021-02-16 | End: 2021-02-26

## 2021-02-16 ASSESSMENT — LIFESTYLE VARIABLES
PACKS_PER_DAY: 1
SMOKING_YEARS: 13
SMOKING_STATUS: NO, I'M A FORMER SMOKER

## 2021-02-18 ENCOUNTER — PATIENT MESSAGE (OUTPATIENT)
Dept: PRIMARY CARE CLINIC | Age: 46
End: 2021-02-18

## 2021-02-18 RX ORDER — MOXIFLOXACIN 5 MG/ML
1 SOLUTION/ DROPS OPHTHALMIC 3 TIMES DAILY
Qty: 3 ML | Refills: 0 | Status: SHIPPED | OUTPATIENT
Start: 2021-02-18 | End: 2021-02-25

## 2021-02-18 NOTE — TELEPHONE ENCOUNTER
From: Ayad Frye  To: Rajan Donovan DO  Sent: 2/18/2021 6:21 AM EST  Subject: Non-Urgent Medical Question    Just checkin. .. i woke up with my eye swollen and filled with water today. Been on antibiotic since tuesday. Sinus pressure is getting better and ear isnt as bad. Do u think eye is just the worse before it gets better stage? Sorry for scarey face so early in the am, lol!

## 2021-02-19 ENCOUNTER — OFFICE VISIT (OUTPATIENT)
Dept: FAMILY MEDICINE CLINIC | Age: 46
End: 2021-02-19
Payer: COMMERCIAL

## 2021-02-19 VITALS
BODY MASS INDEX: 36.88 KG/M2 | DIASTOLIC BLOOD PRESSURE: 80 MMHG | TEMPERATURE: 97.9 F | SYSTOLIC BLOOD PRESSURE: 132 MMHG | HEIGHT: 67 IN | RESPIRATION RATE: 16 BRPM | HEART RATE: 93 BPM | OXYGEN SATURATION: 99 % | WEIGHT: 235 LBS

## 2021-02-19 DIAGNOSIS — H10.32 ACUTE CONJUNCTIVITIS OF LEFT EYE, UNSPECIFIED ACUTE CONJUNCTIVITIS TYPE: Primary | ICD-10-CM

## 2021-02-19 DIAGNOSIS — J01.90 ACUTE BACTERIAL SINUSITIS: ICD-10-CM

## 2021-02-19 DIAGNOSIS — B96.89 ACUTE BACTERIAL SINUSITIS: ICD-10-CM

## 2021-02-19 DIAGNOSIS — R59.0 LYMPHADENOPATHY, PREAURICULAR: ICD-10-CM

## 2021-02-19 PROCEDURE — 99213 OFFICE O/P EST LOW 20 MIN: CPT | Performed by: NURSE PRACTITIONER

## 2021-02-19 RX ORDER — POLYMYXIN B SULFATE AND TRIMETHOPRIM 1; 10000 MG/ML; [USP'U]/ML
1 SOLUTION OPHTHALMIC 4 TIMES DAILY
Qty: 1 BOTTLE | Refills: 0 | Status: SHIPPED | OUTPATIENT
Start: 2021-02-19 | End: 2021-02-26

## 2021-02-19 ASSESSMENT — VISUAL ACUITY: OU: 1

## 2021-02-19 NOTE — PROGRESS NOTES
Social History:  reports that she quit smoking about 4 years ago. Her smoking use included cigarettes. She has a 20.00 pack-year smoking history. She has never used smokeless tobacco. She reports that she does not drink alcohol or use drugs. Family History: family history includes Cancer in her maternal grandmother; Diabetes in her father and paternal grandfather; High Blood Pressure in her father and mother. Allergies: Patient has no known allergies. Physical Exam   Vital Signs:  /80   Pulse 93   Temp 97.9 °F (36.6 °C)   Resp 16   Ht 5' 7\" (1.702 m)   Wt 235 lb (106.6 kg)   SpO2 99%   BMI 36.81 kg/m²    Oxygen Saturation Interpretation: Normal.    Physical Exam  Vitals signs reviewed. Constitutional:       Appearance: Normal appearance. She is well-developed. She is not toxic-appearing. HENT:      Head: Normocephalic and atraumatic. Right Ear: Hearing normal.      Left Ear: Hearing normal.      Nose: Nose normal.      Mouth/Throat:      Lips: Pink. Mouth: Mucous membranes are moist.      Pharynx: Oropharynx is clear. Uvula midline. Eyes:      General: Lids are normal. Vision grossly intact. Left eye: Discharge present. Extraocular Movements: Extraocular movements intact. Conjunctiva/sclera:      Left eye: Left conjunctiva is injected. Exudate present. Pupils: Pupils are equal, round, and reactive to light. Neck:      Musculoskeletal: Normal range of motion. Trachea: Trachea normal.   Cardiovascular:      Rate and Rhythm: Normal rate and regular rhythm. Pulses: Normal pulses. Heart sounds: Normal heart sounds. Pulmonary:      Effort: Pulmonary effort is normal.      Breath sounds: Normal breath sounds. Abdominal:      General: Abdomen is flat. Bowel sounds are normal.      Palpations: Abdomen is soft. Lymphadenopathy:      Head:      Left side of head: Preauricular adenopathy present. Cervical: No cervical adenopathy.    Skin: General: Skin is warm and dry. Capillary Refill: Capillary refill takes less than 2 seconds. Neurological:      Mental Status: She is alert and oriented to person, place, and time. Psychiatric:         Attention and Perception: Attention normal.         Mood and Affect: Mood and affect normal.         Speech: Speech normal.         Behavior: Behavior normal. Behavior is cooperative. Thought Content: Thought content normal.         Cognition and Memory: Cognition normal.         Judgment: Judgment normal.        Test Results Section   (All laboratory and radiology results have been personally reviewed by myself)  Labs:  No results found for this visit on 02/19/21. Assessment / Plan     Impression(s):  Hernan Camarillo was seen today for ear problem. Diagnoses and all orders for this visit:    Acute conjunctivitis of left eye, unspecified acute conjunctivitis type  -     trimethoprim-polymyxin b (POLYTRIM) 32862-6.1 UNIT/ML-% ophthalmic solution; Place 1 drop into the left eye 4 times daily for 7 days    Lymphadenopathy, preauricular    Acute bacterial sinusitis    Eyedrops changed from moxifloxacin to Polytrim, side effects discussed. Patient advised continue OTC medication and cefdinir as previously prescribed. Increase fluids and rest. Symptomatic relief discussed. F/u PCP in 5-7 days if symptoms persist. ED sooner if symptoms worsen or change. Red flag symptoms discussed. Patient verbalized understanding and is in agreement with this care plan. All questions answered. Return if symptoms worsen or fail to improve.     TONI Richards - CNP

## 2021-04-07 ENCOUNTER — VIRTUAL VISIT (OUTPATIENT)
Dept: ENDOCRINOLOGY | Age: 46
End: 2021-04-07
Payer: COMMERCIAL

## 2021-04-07 DIAGNOSIS — Z79.4 TYPE 2 DIABETES MELLITUS WITHOUT COMPLICATION, WITH LONG-TERM CURRENT USE OF INSULIN (HCC): Primary | ICD-10-CM

## 2021-04-07 DIAGNOSIS — E78.5 HYPERLIPIDEMIA, UNSPECIFIED HYPERLIPIDEMIA TYPE: ICD-10-CM

## 2021-04-07 DIAGNOSIS — E66.9 OBESITY WITHOUT SERIOUS COMORBIDITY, UNSPECIFIED CLASSIFICATION, UNSPECIFIED OBESITY TYPE: ICD-10-CM

## 2021-04-07 DIAGNOSIS — E11.9 TYPE 2 DIABETES MELLITUS WITHOUT COMPLICATION, WITH LONG-TERM CURRENT USE OF INSULIN (HCC): Primary | ICD-10-CM

## 2021-04-07 DIAGNOSIS — E55.9 VITAMIN D DEFICIENCY: ICD-10-CM

## 2021-04-07 PROCEDURE — 99214 OFFICE O/P EST MOD 30 MIN: CPT | Performed by: INTERNAL MEDICINE

## 2021-04-07 NOTE — PROGRESS NOTES
[address] [place] [address] Department of Endocrinology Diabetes and Metabolism   [address] [facility] [address]   Phone: [phone]  Fax: [phone]\    Date of Service: 4/7/2021  Primary Care Physician: Terrance Montes DO  Provider: Blaine Araiza MD     Reason for the visit:  DM type 2     History of Present Illness: The history is provided by the patient. No  was used. Accuracy of the patient data is excellent. Eduardo Dorsey is a very pleasant 42 y.o. female with past medical history of PCOS, HTN and Obesity seen today for follow up visit     Eduardo Dorsey was diagnosed with diabetes early 35s.  Started as gestational diabetes and she is currently on Levemir 30 units at bedtime, Humalog ss with meals and at night , Glipizide ER 10 mg BID, Metformin er 1000 daily, Victoza 1.8 mg daily    The patient has been checking BG 4 times a day and most readings at goal   Due for A1c now, previous A1c summarized below  Lab Results   Component Value Date    LABA1C 7.3 10/29/2020    LABA1C 6.6 07/02/2020    LABA1C 6.9 02/27/2020      Patient has had no hypoglycemic episodes   Very good with following diabetes diet and encouraged   I reviewed current medications and the patient has no issues with diabetes medications  Eduardo Dorsey is up to date with eye exam and denied any history of diabetic retinopathy, retinopathy   The patient performs her own feet care and doesn't see podiatry   Microvascular complications:  No Retinopathy, Nephropathy or Neuropathy   Macrovascular complications: no CAD, PVD, or Stroke  On statin     PAST MEDICAL HISTORY   Past Medical History:   Diagnosis Date    Diabetes mellitus ([address]     Fatty liver disease, nonalcoholic     GERD (gastroesophageal reflux disease)     Heart palpitations     Migraine     PCOS (polycystic ovarian syndrome)     Post partum depression      PAST SURGICAL HISTORY   Past Surgical History:   Procedure Laterality Date    each by Does not apply route 2 times daily Bd ultrafine needles to use with victoza pen 200 each 3    glipiZIDE (GLUCOTROL XL) 10 MG extended release tablet Take 1 tablet by mouth 2 times daily 180 tablet 3    venlafaxine (EFFEXOR XR) 150 MG extended release capsule Take 1 capsule by mouth daily 90 capsule 3    albuterol sulfate  (90 Base) MCG/ACT inhaler Inhale 2 puffs into the lungs 4 times daily as needed for Wheezing 1 Inhaler 1    ondansetron (ZOFRAN-ODT) 4 MG disintegrating tablet Take 1 tablet by mouth 3 times daily as needed for Nausea or Vomiting 21 tablet 0    DEXILANT 60 MG CPDR delayed release capsule TAKE 1 CAPSULE DAILY 90 capsule 3     No current facility-administered medications for this visit. Review of Systems  Constitutional: No fever, no chills, no diaphoresis, no generalized weakness. HEENT: No blurred vision, No sore throat, no ear pain, no hair loss  Neck: denied any neck swelling, difficulty swallowing,   Cardio-pulmonary: No CP, SOB or palpitation, No orthopnea or PND. No cough or wheezing. GI: No N/V/D, no constipation, No abdominal pain, no melena or hematochezia   : Denied any dysuria, hematuria, flank pain, discharge, or incontinence. Skin: denied any rash, ulcer, Hirsute, or hyperpigmentation. MSK: denied any joint deformity, joint pain/swelling, muscle pain, or back pain. Neuro: no numbness, no tingling, no weakness, _    OBJECTIVE    There were no vitals taken for this visit.   BP Readings from Last 4 Encounters:   02/19/21 132/80   01/12/21 (!) 130/90   11/18/20 122/72   10/29/20 122/84     Wt Readings from Last 6 Encounters:   02/19/21 235 lb (106.6 kg)   01/12/21 231 lb (104.8 kg)   11/18/20 231 lb (104.8 kg)   10/29/20 236 lb (107 kg)   02/27/20 221 lb (100.2 kg)   01/13/20 223 lb 6.4 oz (101.3 kg)     Physical examination:  Due to this being a TeleHealth encounter, evaluation of the following organ systems is limited: Vitals/Constitutional/EENT/Resp/CV/GI//MS/Neuro/Skin/Heme-Lymph-Imm. Modified physical exam through Telemedicine camera    General: Communicating well via camera   Neck: no obvious neck mass. No obvious neck deformity     CVS: no distress   Chest: no distress. Chest is moving with respiration    Extremities:  no visible tremor  Skin: No visible rashes as seen from camera   Musculoskeletal: no visible deformity  Neuro: Alert and oriented to person, place, and time. Psychiatric: Normal mood and affect.  Behavior is normal    Review of Laboratory Data:  I personally reviewed the following lab:  Lab Results   Component Value Date/Time    WBC 10.4 11/11/2020 08:21 AM    RBC 4.96 11/11/2020 08:21 AM    HGB 11.7 11/11/2020 08:21 AM    HCT 39.1 11/11/2020 08:21 AM    MCV 78.8 (L) 11/11/2020 08:21 AM    MCH 23.6 (L) 11/11/2020 08:21 AM    MCHC 29.9 (L) 11/11/2020 08:21 AM    RDW 19.0 (H) 11/11/2020 08:21 AM     11/11/2020 08:21 AM    MPV 13.1 (H) 11/11/2020 08:21 AM      Lab Results   Component Value Date/Time     10/29/2020 08:59 AM    K 4.5 10/29/2020 08:59 AM    CO2 23 10/29/2020 08:59 AM    BUN 9 10/29/2020 08:59 AM    CREATININE 0.7 10/29/2020 08:59 AM    CALCIUM 9.8 10/29/2020 08:59 AM    LABGLOM >60 10/29/2020 08:59 AM    GFRAA >60 10/29/2020 08:59 AM      Lab Results   Component Value Date/Time    TSH 1.070 10/29/2020 08:59 AM    T4FREE 1.34 10/29/2020 08:59 AM     Lab Results   Component Value Date    LABA1C 7.3 10/29/2020    GLUCOSE 109 10/29/2020    GLUCOSE 124 02/01/2012    MALBCR - 07/02/2020    LABMICR <12.0 07/02/2020    LABCREA 104 07/02/2020     Lab Results   Component Value Date    LABA1C 7.3 10/29/2020    LABA1C 6.6 07/02/2020    LABA1C 6.9 02/27/2020     Lab Results   Component Value Date    CHOL 110 07/02/2020    CHOL 157 09/09/2019    TRIG 155 07/02/2020    TRIG 223 09/09/2019    HDL 32 07/02/2020    HDL 36 09/09/2019     Lab Results   Component Value Date    VITD25 94 07/02/2020 ASSESSMENT & RECOMMENDATIONS   Sandra Hsu, a 42 y.o.-old female seen in for the following issues     Diabetes Mellitus Type 2     · Under good control   · Change DM regimen to levemir 34 units at bedtime, Humalog sliding scale, Metformin er 1000 BID, Victoza 1.8 mg daily, Glipizide ER 10 mg BID  · Continue checking blood sugars 4  times a day  · She is an excellent candidate for insulin pump. Will order insulin pump and CGM  · Discussed with patient A1c and blood sugar goals   · Patient up to date with the routine diabetes maintenance and prevention     H/o Dietary noncompliance   Doing better now     HLD  · Continue Crestor 5 mg daily     Obesity   Discussed lifestyle changes including diet and exercise with patient in depth. Also discussed with patient cardiovascular risk associated with obesity   On GLP-1 agonist     VitD deficiency   · Continue vitD supplement     I personally reviewed external notes from PCP and other patient's care team providers, and personally interpreted labs associated with the above diagnosis. I also ordered labs to further assess and manage the above addressed medical conditions    Return in about 4 months (around 8/7/2021) for DM type 2, Hyperlipidemia, VitD deficiency. The above issues were reviewed with the patient who understood and agreed with the plan. Thank you for allowing us to participate in the care of this patient. Please do not hesitate to contact us with any additional questions. Diagnosis Orders   1. Type 2 diabetes mellitus without complication, with long-term current use of insulin (HCC)  Comprehensive Metabolic Panel    Hemoglobin A1C    Lipid Panel    Microalbumin / Creatinine Urine Ratio   2. Vitamin D deficiency  Comprehensive Metabolic Panel    Vitamin D 25 Hydroxy   3. Hyperlipidemia, unspecified hyperlipidemia type  Lipid Panel   4.  Obesity without serious comorbidity, unspecified classification, unspecified obesity type         Jonnathan Sánchez MD Endocrinologist, [facility])   [address] [facility] [address]   Phone: [phone]  Fax: [phone]  --------------------------------------------  An electronic signature was used to authenticate this note.  Jyoti Norris MD on 4/7/2021 at 1:35 PM

## 2021-04-10 ENCOUNTER — TELEPHONE (OUTPATIENT)
Dept: ENDOCRINOLOGY | Age: 46
End: 2021-04-10

## 2021-04-10 DIAGNOSIS — E55.9 VITAMIN D DEFICIENCY: ICD-10-CM

## 2021-04-10 DIAGNOSIS — E11.9 TYPE 2 DIABETES MELLITUS WITHOUT COMPLICATION, WITH LONG-TERM CURRENT USE OF INSULIN (HCC): Primary | ICD-10-CM

## 2021-04-10 DIAGNOSIS — Z79.4 TYPE 2 DIABETES MELLITUS WITHOUT COMPLICATION, WITH LONG-TERM CURRENT USE OF INSULIN (HCC): Primary | ICD-10-CM

## 2021-04-10 DIAGNOSIS — E66.9 OBESITY WITHOUT SERIOUS COMORBIDITY, UNSPECIFIED CLASSIFICATION, UNSPECIFIED OBESITY TYPE: ICD-10-CM

## 2021-04-12 DIAGNOSIS — Z79.4 TYPE 2 DIABETES MELLITUS WITHOUT COMPLICATION, WITH LONG-TERM CURRENT USE OF INSULIN (HCC): Primary | ICD-10-CM

## 2021-04-12 DIAGNOSIS — E11.9 TYPE 2 DIABETES MELLITUS WITHOUT COMPLICATION, WITH LONG-TERM CURRENT USE OF INSULIN (HCC): Primary | ICD-10-CM

## 2021-04-12 RX ORDER — SUBCUTANEOUS INSULIN PUMP
EACH MISCELLANEOUS
Qty: 1 KIT | Refills: 0 | Status: SHIPPED | OUTPATIENT
Start: 2021-04-12

## 2021-04-12 RX ORDER — BLOOD-GLUCOSE SENSOR
EACH MISCELLANEOUS
Qty: 12 EACH | Refills: 3 | Status: SHIPPED | OUTPATIENT
Start: 2021-04-12

## 2021-04-12 RX ORDER — BLOOD-GLUCOSE TRANSMITTER
EACH MISCELLANEOUS
Qty: 1 EACH | Refills: 0 | Status: SHIPPED | OUTPATIENT
Start: 2021-04-12

## 2021-04-16 ENCOUNTER — TELEPHONE (OUTPATIENT)
Dept: PRIMARY CARE CLINIC | Age: 46
End: 2021-04-16

## 2021-04-16 ENCOUNTER — PATIENT MESSAGE (OUTPATIENT)
Dept: ENDOCRINOLOGY | Age: 46
End: 2021-04-16

## 2021-04-16 NOTE — TELEPHONE ENCOUNTER
Patient sent in the following [employer] message; The [employer]tronic guys say my pump will be delivered tomorrow. If its not too overwhelming and i get it set up and put on, do i continue all medications as i have been?  Dr. Del Toro Bowels like an appt in 3 weeks made too

## 2021-04-19 DIAGNOSIS — E11.9 TYPE 2 DIABETES MELLITUS WITHOUT COMPLICATION, WITHOUT LONG-TERM CURRENT USE OF INSULIN (HCC): ICD-10-CM

## 2021-04-19 NOTE — TELEPHONE ENCOUNTER
Please continue your current DM regimen     Your will need training on the pump first     Also, I will need to see her within two weeks after she finish pump training     Please check with Tr Dumont about pump training

## 2021-04-19 NOTE — TELEPHONE ENCOUNTER
The patient sent in a [employer] message requesting the humalog in vial for her meter. I pended this to you so the correct dosage / unit could be given.

## 2021-04-26 ENCOUNTER — TELEPHONE (OUTPATIENT)
Dept: PRIMARY CARE CLINIC | Age: 46
End: 2021-04-26

## 2021-04-26 DIAGNOSIS — E11.9 TYPE 2 DIABETES MELLITUS WITHOUT COMPLICATION, WITHOUT LONG-TERM CURRENT USE OF INSULIN (HCC): Primary | ICD-10-CM

## 2021-05-03 ENCOUNTER — PATIENT MESSAGE (OUTPATIENT)
Dept: PRIMARY CARE CLINIC | Age: 46
End: 2021-05-03

## 2021-05-04 RX ORDER — LISINOPRIL 20 MG/1
TABLET ORAL
Qty: 90 TABLET | Refills: 3 | Status: SHIPPED
Start: 2021-05-04 | End: 2021-07-29 | Stop reason: SDUPTHER

## 2021-05-04 RX ORDER — VENLAFAXINE HYDROCHLORIDE 150 MG/1
150 CAPSULE, EXTENDED RELEASE ORAL DAILY
Qty: 90 CAPSULE | Refills: 3 | Status: SHIPPED
Start: 2021-05-04 | End: 2021-07-29 | Stop reason: SDUPTHER

## 2021-05-04 RX ORDER — DEXLANSOPRAZOLE 60 MG/1
CAPSULE, DELAYED RELEASE ORAL
Qty: 90 CAPSULE | Refills: 3 | Status: SHIPPED
Start: 2021-05-04 | End: 2021-07-29 | Stop reason: SDUPTHER

## 2021-05-04 RX ORDER — ROSUVASTATIN CALCIUM 5 MG/1
TABLET, COATED ORAL
Qty: 90 TABLET | Refills: 3 | Status: SHIPPED
Start: 2021-05-04 | End: 2021-07-29 | Stop reason: SDUPTHER

## 2021-05-04 NOTE — TELEPHONE ENCOUNTER
From: Eduardo Dorsey  To: Terrance Montes DO  Sent: 5/3/2021 6:24 PM EDT  Subject: Prescription Question    Could you please send refills to [facility] order pharmacy for venlafaxine hcl er, dexilant, lisinopril, and rovustatin?  I appreciate your help!!

## 2021-05-20 ENCOUNTER — OFFICE VISIT (OUTPATIENT)
Dept: ENDOCRINOLOGY | Age: 46
End: 2021-05-20
Payer: COMMERCIAL

## 2021-05-20 VITALS
BODY MASS INDEX: 36 KG/M2 | DIASTOLIC BLOOD PRESSURE: 89 MMHG | HEART RATE: 93 BPM | HEIGHT: 67 IN | SYSTOLIC BLOOD PRESSURE: 133 MMHG | WEIGHT: 229.4 LBS

## 2021-05-20 DIAGNOSIS — E55.9 VITAMIN D DEFICIENCY: ICD-10-CM

## 2021-05-20 DIAGNOSIS — Z79.4 TYPE 2 DIABETES MELLITUS WITHOUT COMPLICATION, WITH LONG-TERM CURRENT USE OF INSULIN (HCC): Primary | ICD-10-CM

## 2021-05-20 DIAGNOSIS — E11.9 TYPE 2 DIABETES MELLITUS WITHOUT COMPLICATION, WITH LONG-TERM CURRENT USE OF INSULIN (HCC): Primary | ICD-10-CM

## 2021-05-20 LAB — HBA1C MFR BLD: 7.5 %

## 2021-05-20 PROCEDURE — 83036 HEMOGLOBIN GLYCOSYLATED A1C: CPT | Performed by: INTERNAL MEDICINE

## 2021-05-20 PROCEDURE — 99214 OFFICE O/P EST MOD 30 MIN: CPT | Performed by: INTERNAL MEDICINE

## 2021-05-20 PROCEDURE — 3051F HG A1C>EQUAL 7.0%<8.0%: CPT | Performed by: INTERNAL MEDICINE

## 2021-05-20 NOTE — PROGRESS NOTES
[address] [place] [address] Department of Endocrinology Diabetes and Metabolism   [address] [facility] [address]   Phone: [phone]  Fax: [phone]\    Date of Service: 2021  Primary Care Physician: Nikki Angel DO  Provider: Jonnathan Sánchez MD     Reason for the visit:  DM type 2     History of Present Illness: The history is provided by the patient. No  was used. Accuracy of the patient data is excellent. Sandra Hsu is a very pleasant 42 y.o. female with past medical history of PCOS, HTN and Obesity seen today for follow up visit     Sandra Hsu was diagnosed with diabetes early 35s.  Started as gestational diabetes and she is currently on 5g [employer]tr[employer] insulin pump with CGM   Current pump settings: Basal rate 0.7, CR 12, ISF 56, goal 100-150, active insulin time 3 hrs   Lab Results   Component Value Date    LABA1C 7.5 2021    LABA1C 7.3 10/29/2020    LABA1C 6.6 2020      Patient has had no hypoglycemic episodes   Very good with following diabetes diet and encouraged   I reviewed current medications and the patient has no issues with diabetes medications  Sandra Hsu is up to date with eye exam and denied any history of diabetic retinopathy, retinopathy   The patient performs her own feet care and doesn't see podiatry   Microvascular complications:  No Retinopathy, Nephropathy or Neuropathy   Macrovascular complications: no CAD, PVD, or Stroke  On statin     PAST MEDICAL HISTORY   Past Medical History:   Diagnosis Date    Diabetes mellitus ([address]     Fatty liver disease, nonalcoholic     GERD (gastroesophageal reflux disease)     Heart palpitations     Migraine     PCOS (polycystic ovarian syndrome)     Post partum depression      PAST SURGICAL HISTORY   Past Surgical History:   Procedure Laterality Date     SECTION      DILATION AND CURETTAGE OF UTERUS       SOCIAL HISTORY   Tobacco:   reports that she quit smoking about 4 years ago. Her smoking use included cigarettes. She has a 20.00 pack-year smoking history. She has never used smokeless tobacco.  Alcol:   reports no history of alcohol use. Illicit Drugs:   reports no history of drug use. FAMILY HISTORY   Family History   Problem Relation Age of Onset    High Blood Pressure Mother     High Blood Pressure Father     Diabetes Father     Cancer Maternal Grandmother         unknown if it was uterine, ovarian or cervix     Diabetes Paternal Grandfather      ALLERGIES AND DRUG REACTIONS   No Known Allergies    CURRENT MEDICATIONS   Current Outpatient Medications   Medication Sig Dispense Refill    venlafaxine (EFFEXOR XR) 150 MG extended release capsule Take 1 capsule by mouth daily 90 capsule 3    DEXILANT 60 MG CPDR delayed release capsule TAKE 1 CAPSULE DAILY 90 capsule 3    lisinopril (PRINIVIL;ZESTRIL) 20 MG tablet TAKE 1 TABLET DAILY 90 tablet 3    rosuvastatin (CRESTOR) 5 MG tablet TAKE 1 TABLET NIGHTLY 90 tablet 3    insulin lispro (HUMALOG) 100 UNIT/ML injection vial Via insulin pump.  MAX 75 Units/day 3 vial 11    Insulin Infusion Pump (MINIMED 770G INSULIN PUMP SYS) KIT To infuse insulin 1 kit 0    Continuous Blood Gluc Transmit (GUARDIAN LINK 3 TRANSMITTER) MISC To use with sensors 1 each 0    Continuous Blood Gluc Sensor (GUARDIAN SENSOR 3) MISC To change every 7 days 12 each 3    Insulin Pen Needle (BD PEN NEEDLE WEST U/F) 32G X 4 MM MISC Uses with insulin 4 times a day 250 each 5    albuterol sulfate  (90 Base) MCG/ACT inhaler Inhale 2 puffs into the lungs every 4 hours as needed for Wheezing 1 Inhaler 0    metFORMIN (GLUCOPHAGE) 1000 MG tablet TAKE 1 TABLET TWICE DAILY  WITH MEALS 180 tablet 3    blood glucose test strips (ASCENSIA AUTODISC VI;ONE TOUCH ULTRA TEST VI) strip 1 each by In Vitro route 3 times daily 250 each 5    VICTOZA 18 MG/3ML SOPN SC injection Inject 1.8 mg into the skin daily 9 pen 5    butalbital-acetaminophen-caffeine (FIORICET, ESGIC) -40 MG per tablet Take 1 tablet by mouth every 6 hours as needed for Headaches or Migraine 90 tablet 3    Insulin Pen Needle (BD ULTRA-FINE PEN NEEDLES) 29G X 12.7MM MISC 1 each by Does not apply route 2 times daily Bd ultrafine needles to use with victoza pen 200 each 3    glipiZIDE (GLUCOTROL XL) 10 MG extended release tablet Take 1 tablet by mouth 2 times daily 180 tablet 3    albuterol sulfate  (90 Base) MCG/ACT inhaler Inhale 2 puffs into the lungs 4 times daily as needed for Wheezing 1 Inhaler 1     No current facility-administered medications for this visit. Review of Systems  Constitutional: No fever, no chills, no diaphoresis, no generalized weakness. HEENT: No blurred vision, No sore throat, no ear pain, no hair loss  Neck: denied any neck swelling, difficulty swallowing,   Cardio-pulmonary: No CP, SOB or palpitation, No orthopnea or PND. No cough or wheezing. GI: No N/V/D, no constipation, No abdominal pain, no melena or hematochezia   : Denied any dysuria, hematuria, flank pain, discharge, or incontinence. Skin: denied any rash, ulcer, Hirsute, or hyperpigmentation. MSK: denied any joint deformity, joint pain/swelling, muscle pain, or back pain.   Neuro: no numbness, no tingling, no weakness, _    OBJECTIVE    /89   Pulse 93   Ht 5' 7\" (1.702 m)   Wt 229 lb 6.4 oz (104.1 kg)   BMI 35.93 kg/m²   BP Readings from Last 4 Encounters:   05/20/21 133/89   02/19/21 132/80   01/12/21 (!) 130/90   11/18/20 122/72     Wt Readings from Last 6 Encounters:   05/20/21 229 lb 6.4 oz (104.1 kg)   02/19/21 235 lb (106.6 kg)   01/12/21 231 lb (104.8 kg)   11/18/20 231 lb (104.8 kg)   10/29/20 236 lb (107 kg)   02/27/20 221 lb (100.2 kg)     Physical examination:  General: awake alert, oriented x3  HEENT: normocephalic non traumatic, no exophthalmos   Neck: supple, thyroid tenderness,  Pulm: Clear equal air entry no added sounds  CVS: S1 + S2  Abd: soft lax, no tenderness  Skin: warm, no lesions, no rash.  No open wounds, no ulcers   Neuro: CN intact, sensation present bilateral , muscle power normal  Psych: normal mood, and affect    Review of Laboratory Data:  I personally reviewed the following lab:  Lab Results   Component Value Date/Time    WBC 10.4 11/11/2020 08:21 AM    RBC 4.96 11/11/2020 08:21 AM    HGB 11.7 11/11/2020 08:21 AM    HCT 39.1 11/11/2020 08:21 AM    MCV 78.8 (L) 11/11/2020 08:21 AM    MCH 23.6 (L) 11/11/2020 08:21 AM    MCHC 29.9 (L) 11/11/2020 08:21 AM    RDW 19.0 (H) 11/11/2020 08:21 AM     11/11/2020 08:21 AM    MPV 13.1 (H) 11/11/2020 08:21 AM      Lab Results   Component Value Date/Time     10/29/2020 08:59 AM    K 4.5 10/29/2020 08:59 AM    CO2 23 10/29/2020 08:59 AM    BUN 9 10/29/2020 08:59 AM    CREATININE 0.7 10/29/2020 08:59 AM    CALCIUM 9.8 10/29/2020 08:59 AM    LABGLOM >60 10/29/2020 08:59 AM    GFRAA >60 10/29/2020 08:59 AM      Lab Results   Component Value Date/Time    TSH 1.070 10/29/2020 08:59 AM    T4FREE 1.34 10/29/2020 08:59 AM     Lab Results   Component Value Date    LABA1C 7.5 05/20/2021    GLUCOSE 109 10/29/2020    GLUCOSE 124 02/01/2012    MALBCR - 07/02/2020    LABMICR <12.0 07/02/2020    LABCREA 104 07/02/2020     Lab Results   Component Value Date    LABA1C 7.5 05/20/2021    LABA1C 7.3 10/29/2020    LABA1C 6.6 07/02/2020     Lab Results   Component Value Date    CHOL 110 07/02/2020    CHOL 157 09/09/2019    TRIG 155 07/02/2020    TRIG 223 09/09/2019    HDL 32 07/02/2020    HDL 36 09/09/2019     Lab Results   Component Value Date    VITD25 94 07/02/2020     ASSESSMENT & RECOMMENDATIONS   Radha Limber, a 42 y.o.-old female seen in for the following issues     Diabetes Mellitus Type 2     · Under good control   · Continue current pump settings:  Basal rate 0.7, CR 12, ISF 56, goal 100-150, active insulin time 3 hrs   · Continue using CGM   · Discussed with patient A1c and blood sugar goals   · Patient up to date with the routine diabetes maintenance and prevention   · Labs before next visit     HLD  · Continue Crestor 5 mg daily     VitD deficiency   · Continue vitD supplement     I personally reviewed external notes from PCP and other patient's care team providers, and personally interpreted labs associated with the above diagnosis. I also ordered labs to further assess and manage the above addressed medical conditions    Return in about 4 months (around 9/20/2021) for DM type 2, VitD deficiency. The above issues were reviewed with the patient who understood and agreed with the plan. Thank you for allowing us to participate in the care of this patient. Please do not hesitate to contact us with any additional questions. Diagnosis Orders   1. Type 2 diabetes mellitus without complication, with long-term current use of insulin (HCC)  POCT glycosylated hemoglobin (Hb A1C)    Comprehensive Metabolic Panel    Hemoglobin A1C    Lipid Panel    Microalbumin / Creatinine Urine Ratio    TSH without Reflex   2. Vitamin D deficiency  Vitamin D 25 Hydroxy       Ruby Loja MD  Endocrinologist, [facility])   [address] [place] [address], [address] [place] [address],[facility] [address] [id number]   Phone: [phone]  Fax: [phone]  --------------------------------------------  An electronic signature was used to authenticate this note.  Esther Castellon MD on 5/20/2021 at 12:14 PM

## 2021-05-31 DIAGNOSIS — E11.9 TYPE 2 DIABETES MELLITUS WITHOUT COMPLICATION, WITHOUT LONG-TERM CURRENT USE OF INSULIN (HCC): ICD-10-CM

## 2021-06-01 RX ORDER — GLIPIZIDE 10 MG/1
TABLET, FILM COATED, EXTENDED RELEASE ORAL
Qty: 180 TABLET | Refills: 3 | Status: SHIPPED
Start: 2021-06-01 | End: 2021-09-13 | Stop reason: SDUPTHER

## 2021-07-29 ENCOUNTER — OFFICE VISIT (OUTPATIENT)
Dept: PRIMARY CARE CLINIC | Age: 46
End: 2021-07-29
Payer: COMMERCIAL

## 2021-07-29 VITALS
OXYGEN SATURATION: 98 % | WEIGHT: 231 LBS | HEART RATE: 91 BPM | HEIGHT: 67 IN | BODY MASS INDEX: 36.26 KG/M2 | RESPIRATION RATE: 16 BRPM | SYSTOLIC BLOOD PRESSURE: 124 MMHG | DIASTOLIC BLOOD PRESSURE: 76 MMHG

## 2021-07-29 DIAGNOSIS — I10 ESSENTIAL HYPERTENSION: ICD-10-CM

## 2021-07-29 DIAGNOSIS — R53.83 FATIGUE, UNSPECIFIED TYPE: ICD-10-CM

## 2021-07-29 DIAGNOSIS — E11.9 TYPE 2 DIABETES MELLITUS WITHOUT COMPLICATION, WITHOUT LONG-TERM CURRENT USE OF INSULIN (HCC): ICD-10-CM

## 2021-07-29 DIAGNOSIS — E78.49 FAMILIAL HYPERLIPIDEMIA: Chronic | ICD-10-CM

## 2021-07-29 DIAGNOSIS — D64.9 ANEMIA, UNSPECIFIED TYPE: Primary | ICD-10-CM

## 2021-07-29 DIAGNOSIS — I10 ESSENTIAL HYPERTENSION: Primary | ICD-10-CM

## 2021-07-29 LAB
ALBUMIN SERPL-MCNC: 4.4 G/DL (ref 3.5–5.2)
ALP BLD-CCNC: 58 U/L (ref 35–104)
ALT SERPL-CCNC: 23 U/L (ref 0–32)
ANION GAP SERPL CALCULATED.3IONS-SCNC: 12 MMOL/L (ref 7–16)
AST SERPL-CCNC: 18 U/L (ref 0–31)
BILIRUB SERPL-MCNC: <0.2 MG/DL (ref 0–1.2)
BUN BLDV-MCNC: 12 MG/DL (ref 6–20)
CALCIUM SERPL-MCNC: 9.2 MG/DL (ref 8.6–10.2)
CHLORIDE BLD-SCNC: 100 MMOL/L (ref 98–107)
CHOLESTEROL, TOTAL: 125 MG/DL (ref 0–199)
CO2: 25 MMOL/L (ref 22–29)
CREAT SERPL-MCNC: 0.7 MG/DL (ref 0.5–1)
CREATININE URINE: 68 MG/DL (ref 29–226)
GFR AFRICAN AMERICAN: >60
GFR NON-AFRICAN AMERICAN: >60 ML/MIN/1.73
GLUCOSE BLD-MCNC: 152 MG/DL (ref 74–99)
HCT VFR BLD CALC: 35.4 % (ref 34–48)
HDLC SERPL-MCNC: 38 MG/DL
HEMOGLOBIN: 10.2 G/DL (ref 11.5–15.5)
LDL CHOLESTEROL CALCULATED: 61 MG/DL (ref 0–99)
MCH RBC QN AUTO: 21.2 PG (ref 26–35)
MCHC RBC AUTO-ENTMCNC: 28.8 % (ref 32–34.5)
MCV RBC AUTO: 73.6 FL (ref 80–99.9)
MICROALBUMIN UR-MCNC: 14.8 MG/L
MICROALBUMIN/CREAT UR-RTO: 21.8 (ref 0–30)
PDW BLD-RTO: 19.6 FL (ref 11.5–15)
PLATELET # BLD: 377 E9/L (ref 130–450)
PMV BLD AUTO: 12.4 FL (ref 7–12)
POTASSIUM SERPL-SCNC: 4.3 MMOL/L (ref 3.5–5)
RBC # BLD: 4.81 E12/L (ref 3.5–5.5)
SODIUM BLD-SCNC: 137 MMOL/L (ref 132–146)
TOTAL PROTEIN: 7.5 G/DL (ref 6.4–8.3)
TRIGL SERPL-MCNC: 131 MG/DL (ref 0–149)
TSH SERPL DL<=0.05 MIU/L-ACNC: 1.09 UIU/ML (ref 0.27–4.2)
VLDLC SERPL CALC-MCNC: 26 MG/DL
WBC # BLD: 10.8 E9/L (ref 4.5–11.5)

## 2021-07-29 PROCEDURE — 3051F HG A1C>EQUAL 7.0%<8.0%: CPT | Performed by: FAMILY MEDICINE

## 2021-07-29 PROCEDURE — 99214 OFFICE O/P EST MOD 30 MIN: CPT | Performed by: FAMILY MEDICINE

## 2021-07-29 RX ORDER — DEXLANSOPRAZOLE 60 MG/1
CAPSULE, DELAYED RELEASE ORAL
Qty: 90 CAPSULE | Refills: 3 | Status: SHIPPED
Start: 2021-07-29 | Stop reason: SDUPTHER

## 2021-07-29 RX ORDER — LISINOPRIL 20 MG/1
TABLET ORAL
Qty: 90 TABLET | Refills: 3 | Status: SHIPPED
Start: 2021-07-29 | Stop reason: SDUPTHER

## 2021-07-29 RX ORDER — ROSUVASTATIN CALCIUM 5 MG/1
TABLET, COATED ORAL
Qty: 90 TABLET | Refills: 3 | Status: SHIPPED
Start: 2021-07-29 | Stop reason: SDUPTHER

## 2021-07-29 RX ORDER — VENLAFAXINE HYDROCHLORIDE 150 MG/1
150 CAPSULE, EXTENDED RELEASE ORAL DAILY
Qty: 90 CAPSULE | Refills: 3 | Status: SHIPPED
Start: 2021-07-29 | Stop reason: SDUPTHER

## 2021-07-29 ASSESSMENT — ENCOUNTER SYMPTOMS
BACK PAIN: 0
EYE PAIN: 0
VOMITING: 0
RHINORRHEA: 0
SHORTNESS OF BREATH: 0
EYE REDNESS: 0
CHEST TIGHTNESS: 0
DIARRHEA: 0
COUGH: 0
CONSTIPATION: 0
SORE THROAT: 0
COLOR CHANGE: 0
WHEEZING: 0
SINUS PRESSURE: 0
EYE ITCHING: 0
ABDOMINAL PAIN: 0
APNEA: 0
BLOOD IN STOOL: 0
NAUSEA: 0

## 2021-07-29 ASSESSMENT — PATIENT HEALTH QUESTIONNAIRE - PHQ9
SUM OF ALL RESPONSES TO PHQ9 QUESTIONS 1 & 2: 0
SUM OF ALL RESPONSES TO PHQ QUESTIONS 1-9: 0
1. LITTLE INTEREST OR PLEASURE IN DOING THINGS: 0
SUM OF ALL RESPONSES TO PHQ QUESTIONS 1-9: 0
2. FEELING DOWN, DEPRESSED OR HOPELESS: 0
SUM OF ALL RESPONSES TO PHQ QUESTIONS 1-9: 0

## 2021-07-29 NOTE — PROGRESS NOTES
Chief Complaint:     Chief Complaint   Patient presents with    Diabetes    Hypertension         Diabetes  She presents for her follow-up diabetic visit. She has type 2 diabetes mellitus. Her disease course has been stable. Pertinent negatives for hypoglycemia include no dizziness, headaches or nervousness/anxiousness. Associated symptoms include fatigue. Pertinent negatives for diabetes include no chest pain and no weakness. There are no hypoglycemic complications. Symptoms are stable. There are no diabetic complications. Pertinent negatives for diabetic complications include no CVA or PVD. Risk factors for coronary artery disease include diabetes mellitus and obesity. Current diabetic treatment includes oral agent (triple therapy). She is compliant with treatment all of the time. Her weight is stable. She is following a generally healthy diet. When asked about meal planning, she reported none. She rarely participates in exercise. There is no change in her home blood glucose trend. An ACE inhibitor/angiotensin II receptor blocker is being taken. She does not see a podiatrist.Eye exam is current. Hypertension  This is a chronic problem. The current episode started more than 1 month ago. The problem is unchanged. The problem is controlled. Associated symptoms include anxiety. Pertinent negatives include no chest pain, headaches, malaise/fatigue, neck pain, palpitations or shortness of breath. There are no associated agents to hypertension. Risk factors for coronary artery disease include diabetes mellitus, dyslipidemia and obesity. Past treatments include ACE inhibitors. The current treatment provides significant improvement. There are no compliance problems. There is no history of CAD/MI, CVA or PVD. There is no history of a hypertension causing med, pheochromocytoma, renovascular disease, sleep apnea or a thyroid problem. Fatigue  This is a recurrent problem.  The current episode started more than 1 month ago. The problem occurs daily. The problem has been unchanged. Associated symptoms include fatigue. Pertinent negatives include no abdominal pain, arthralgias, chest pain, congestion, coughing, diaphoresis, fever, headaches, myalgias, nausea, neck pain, numbness, rash, sore throat, vertigo, vomiting or weakness. Nothing aggravates the symptoms. She has tried nothing for the symptoms. The treatment provided no relief. Patient Active Problem List   Diagnosis    GERD (gastroesophageal reflux disease)    Essential hypertension    Type 2 diabetes mellitus without complication, without long-term current use of insulin (HCC)    Familial hyperlipidemia    Irritable bowel syndrome with diarrhea    Dysfunction of both eustachian tubes    DUB (dysfunctional uterine bleeding)    Fatigue       Past Medical History:   Diagnosis Date    Diabetes mellitus ([facility] [address]     Fatty liver disease, nonalcoholic     GERD (gastroesophageal reflux disease)     Heart palpitations     Migraine     PCOS (polycystic ovarian syndrome)     Post partum depression        Past Surgical History:   Procedure Laterality Date     SECTION      DILATION AND CURETTAGE OF UTERUS         Current Outpatient Medications   Medication Sig Dispense Refill    venlafaxine (EFFEXOR XR) 150 MG extended release capsule Take 1 capsule by mouth daily 90 capsule 3    lisinopril (PRINIVIL;ZESTRIL) 20 MG tablet TAKE 1 TABLET DAILY 90 tablet 3    rosuvastatin (CRESTOR) 5 MG tablet TAKE 1 TABLET NIGHTLY 90 tablet 3    DEXILANT 60 MG CPDR delayed release capsule TAKE 1 CAPSULE DAILY 90 capsule 3    glipiZIDE (GLUCOTROL XL) 10 MG extended release tablet TAKE 1 TABLET TWICE A  tablet 3    insulin lispro (HUMALOG) 100 UNIT/ML injection vial Via insulin pump.  MAX 75 Units/day 3 vial 11    Insulin Infusion Pump (MINIMED 770G INSULIN PUMP SYS) KIT To infuse insulin 1 kit 0    Continuous Blood Gluc Transmit (GUARDIAN LINK 3 TRANSMITTER) MISC To use with sensors 1 each 0    Continuous Blood Gluc Sensor (GUARDIAN SENSOR 3) MISC To change every 7 days 12 each 3    Insulin Pen Needle (BD PEN NEEDLE WEST U/F) 32G X 4 MM MISC Uses with insulin 4 times a day 250 each 5    albuterol sulfate  (90 Base) MCG/ACT inhaler Inhale 2 puffs into the lungs every 4 hours as needed for Wheezing 1 Inhaler 0    metFORMIN (GLUCOPHAGE) 1000 MG tablet TAKE 1 TABLET TWICE DAILY  WITH MEALS 180 tablet 3    blood glucose test strips (ASCENSIA AUTODISC VI;ONE TOUCH ULTRA TEST VI) strip 1 each by In Vitro route 3 times daily 250 each 5    VICTOZA 18 MG/3ML SOPN SC injection Inject 1.8 mg into the skin daily 9 pen 5    butalbital-acetaminophen-caffeine (FIORICET, ESGIC) -40 MG per tablet Take 1 tablet by mouth every 6 hours as needed for Headaches or Migraine 90 tablet 3    Insulin Pen Needle (BD ULTRA-FINE PEN NEEDLES) 29G X 12.7MM MISC 1 each by Does not apply route 2 times daily Bd ultrafine needles to use with victoza pen 200 each 3     No current facility-administered medications for this visit. No Known Allergies    Social History     Socioeconomic History    Marital status:      Spouse name: None    Number of children: None    Years of education: None    Highest education level: None   Occupational History     Employer: [employer]   Tobacco Use    Smoking status: Former Smoker     Packs/day: 2.00     Years: 10.00     Pack years: 20.00     Types: Cigarettes     Quit date: 2016     Years since quittin.7    Smokeless tobacco: Never Used    Tobacco comment: Sometimes smokes cigars   Vaping Use    Vaping Use: Former   Substance and Sexual Activity    Alcohol use: No     Comment: occ.     Drug use: No    Sexual activity: Yes     Partners: Male   Other Topics Concern    None   Social History Narrative    None     Social Determinants of Health     Financial Resource Strain:     Difficulty of Paying Living Expenses:    Food Insecurity:     Worried About Running Out of Food in the Last Year:     [address] in the Last Year:    Transportation Needs:     Lack of Transportation (Medical):  Lack of Transportation (Non-Medical):    Physical Activity:     Days of Exercise per Week:     Minutes of Exercise per Session:    Stress:     Feeling of Stress :    Social Connections:     Frequency of Communication with Friends and Family:     Frequency of Social Gatherings with Friends and Family:     Attends Rastafarian Services:     Active Member of Clubs or Organizations:     Attends Club or Organization Meetings:     Marital Status:    Intimate Partner Violence:     Fear of Current or Ex-Partner:     Emotionally Abused:     Physically Abused:     Sexually Abused:        Family History   Problem Relation Age of Onset    High Blood Pressure Mother     High Blood Pressure Father     Diabetes Father     Cancer Maternal Grandmother         unknown if it was uterine, ovarian or cervix     Diabetes Paternal Grandfather           Review of Systems   Constitutional: Positive for fatigue. Negative for activity change, appetite change, diaphoresis, fever and malaise/fatigue. HENT: Negative for congestion, ear pain, hearing loss, nosebleeds, rhinorrhea, sinus pressure and sore throat. Eyes: Negative for pain, redness, itching and visual disturbance. Respiratory: Negative for apnea, cough, chest tightness, shortness of breath and wheezing. Cardiovascular: Negative for chest pain, palpitations and leg swelling. Gastrointestinal: Negative for abdominal pain, blood in stool, constipation, diarrhea, nausea and vomiting. Endocrine: Negative. Genitourinary: Negative for decreased urine volume, difficulty urinating, dysuria, frequency, hematuria and urgency. Musculoskeletal: Negative for arthralgias, back pain, gait problem, myalgias and neck pain. Skin: Negative for color change and rash.    Allergic/Immunologic: Negative for environmental allergies and food allergies. Neurological: Negative for dizziness, vertigo, weakness, light-headedness, numbness and headaches. Hematological: Negative for adenopathy. Does not bruise/bleed easily. Psychiatric/Behavioral: Negative for behavioral problems, dysphoric mood and sleep disturbance. The patient is not nervous/anxious and is not hyperactive. All other systems reviewed and are negative. /76   Pulse 91   Resp 16   Ht 5' 7\" (1.702 m)   Wt 231 lb (104.8 kg)   SpO2 98%   BMI 36.18 kg/m²     Physical Exam  Vitals and nursing note reviewed. Constitutional:       General: She is not in acute distress. Appearance: Normal appearance. She is well-developed. HENT:      Head: Normocephalic and atraumatic. Right Ear: Hearing, tympanic membrane and external ear normal. No tenderness. No middle ear effusion. Left Ear: Hearing, tympanic membrane and external ear normal. No tenderness. No middle ear effusion. Nose: Nose normal. No congestion or rhinorrhea. Right Turbinates: Not enlarged. Left Turbinates: Not enlarged. Mouth/Throat:      Mouth: Mucous membranes are moist.      Tongue: No lesions. Pharynx: Oropharynx is clear. No oropharyngeal exudate or posterior oropharyngeal erythema. Eyes:      General: No scleral icterus. Conjunctiva/sclera: Conjunctivae normal.      Pupils: Pupils are equal, round, and reactive to light. Neck:      Thyroid: No thyromegaly. Cardiovascular:      Rate and Rhythm: Normal rate and regular rhythm. Heart sounds: Normal heart sounds. No murmur heard. Pulmonary:      Effort: Pulmonary effort is normal. No respiratory distress. Breath sounds: Normal breath sounds. No wheezing or rales. Abdominal:      General: Bowel sounds are normal. There is no distension. Palpations: Abdomen is soft. Tenderness: There is no abdominal tenderness.    Musculoskeletal: General: No tenderness. Normal range of motion. Cervical back: Normal range of motion and neck supple. No rigidity. No muscular tenderness. Lymphadenopathy:      Cervical: No cervical adenopathy. Skin:     General: Skin is warm and dry. Findings: No erythema or rash. Neurological:      General: No focal deficit present. Mental Status: She is alert and oriented to person, place, and time. Cranial Nerves: No cranial nerve deficit. Deep Tendon Reflexes: Reflexes are normal and symmetric. Reflexes normal.   Psychiatric:         Mood and Affect: Mood normal.                                 ASSESSMENT/PLAN:    Patient Active Problem List   Diagnosis    GERD (gastroesophageal reflux disease)    Essential hypertension    Type 2 diabetes mellitus without complication, without long-term current use of insulin ([facility] [address]    Familial hyperlipidemia    Irritable bowel syndrome with diarrhea    Dysfunction of both eustachian tubes    DUB (dysfunctional uterine bleeding)    Fatigue       Jelani Gradykrystal was seen today for diabetes and hypertension. Diagnoses and all orders for this visit:    Essential hypertension  -     Comprehensive Metabolic Panel; Future  -     Lipid Panel; Future  -     TSH without Reflex; Future    Type 2 diabetes mellitus without complication, without long-term current use of insulin ([facility])  -     CBC; Future  -     Comprehensive Metabolic Panel; Future  -     Lipid Panel; Future  -     TSH without Reflex; Future  -     Microalbumin / Creatinine Urine Ratio; Future    Familial hyperlipidemia  -     Comprehensive Metabolic Panel; Future  -     Lipid Panel; Future  -     TSH without Reflex; Future    Fatigue, unspecified type    Other orders  -     venlafaxine (EFFEXOR XR) 150 MG extended release capsule;  Take 1 capsule by mouth daily  -     lisinopril (PRINIVIL;ZESTRIL) 20 MG tablet; TAKE 1 TABLET DAILY  -     rosuvastatin (CRESTOR) 5 MG tablet; TAKE 1 TABLET NIGHTLY  -     DEXILANT

## 2021-08-04 ENCOUNTER — PATIENT MESSAGE (OUTPATIENT)
Dept: PRIMARY CARE CLINIC | Age: 46
End: 2021-08-04

## 2021-08-04 RX ORDER — LISINOPRIL 20 MG/1
TABLET ORAL
Qty: 90 TABLET | Refills: 3 | Status: SHIPPED
Start: 2021-08-04 | End: 2022-05-20

## 2021-08-04 RX ORDER — ROSUVASTATIN CALCIUM 5 MG/1
TABLET, COATED ORAL
Qty: 90 TABLET | Refills: 3 | Status: SHIPPED
Start: 2021-08-04 | End: 2022-05-20

## 2021-08-04 RX ORDER — VENLAFAXINE HYDROCHLORIDE 150 MG/1
150 CAPSULE, EXTENDED RELEASE ORAL DAILY
Qty: 90 CAPSULE | Refills: 3 | Status: SHIPPED
Start: 2021-08-04 | End: 2022-05-20

## 2021-08-04 RX ORDER — DEXLANSOPRAZOLE 60 MG/1
CAPSULE, DELAYED RELEASE ORAL
Qty: 90 CAPSULE | Refills: 3 | Status: SHIPPED
Start: 2021-08-04 | End: 2021-09-22 | Stop reason: SDUPTHER

## 2021-08-09 ENCOUNTER — TELEPHONE (OUTPATIENT)
Dept: ENDOCRINOLOGY | Age: 46
End: 2021-08-09

## 2021-08-16 DIAGNOSIS — D64.9 ANEMIA, UNSPECIFIED TYPE: Primary | ICD-10-CM

## 2021-08-18 ENCOUNTER — PATIENT MESSAGE (OUTPATIENT)
Dept: PRIMARY CARE CLINIC | Age: 46
End: 2021-08-18

## 2021-08-18 DIAGNOSIS — D64.9 ANEMIA, UNSPECIFIED TYPE: ICD-10-CM

## 2021-08-18 LAB
CONTROL: NORMAL
HEMOCCULT STL QL: NORMAL

## 2021-08-18 NOTE — TELEPHONE ENCOUNTER
From: Hansa Machuca  To: Rick Parra DO  Sent: 8/18/2021 2:42 PM EDT  Subject: Test Results Question    Should have asked when elisa called, i apologize. Since the fit test was ok, Should i be concerned about the hemoglobin or just roll with it?

## 2021-08-19 RX ORDER — FERROUS SULFATE 325(65) MG
325 TABLET ORAL
Qty: 90 TABLET | Refills: 1 | Status: SHIPPED | OUTPATIENT
Start: 2021-08-19

## 2021-08-30 ENCOUNTER — TELEPHONE (OUTPATIENT)
Dept: PRIMARY CARE CLINIC | Age: 46
End: 2021-08-30

## 2021-08-30 DIAGNOSIS — E11.9 TYPE 2 DIABETES MELLITUS WITHOUT COMPLICATION, WITHOUT LONG-TERM CURRENT USE OF INSULIN (HCC): Primary | ICD-10-CM

## 2021-08-30 NOTE — TELEPHONE ENCOUNTER
----- Message from Hadley Quinn sent at 8/30/2021 12:21 PM EDT -----  Subject: Message to Provider    QUESTIONS  Information for Provider? Patient asked is there still a carbohydrate   count and diabetic class. She said she saw a flyer for it in the elevator   and was wondering is it still going on or did she miss it.  ---------------------------------------------------------------------------  --------------  [address]  What is the best way for the office to contact you? OK to leave message on   voicemail  Preferred Call Back Phone Number? [phone]  ---------------------------------------------------------------------------  --------------  SCRIPT ANSWERS  Relationship to Patient?  Self

## 2021-09-13 ENCOUNTER — OFFICE VISIT (OUTPATIENT)
Dept: PRIMARY CARE CLINIC | Age: 46
End: 2021-09-13
Payer: COMMERCIAL

## 2021-09-13 VITALS
OXYGEN SATURATION: 97 % | HEART RATE: 98 BPM | DIASTOLIC BLOOD PRESSURE: 76 MMHG | TEMPERATURE: 98 F | WEIGHT: 233 LBS | BODY MASS INDEX: 36.49 KG/M2 | SYSTOLIC BLOOD PRESSURE: 120 MMHG

## 2021-09-13 DIAGNOSIS — J40 BRONCHITIS: Primary | ICD-10-CM

## 2021-09-13 DIAGNOSIS — E11.9 TYPE 2 DIABETES MELLITUS WITHOUT COMPLICATION, WITHOUT LONG-TERM CURRENT USE OF INSULIN (HCC): ICD-10-CM

## 2021-09-13 PROCEDURE — 99213 OFFICE O/P EST LOW 20 MIN: CPT | Performed by: FAMILY MEDICINE

## 2021-09-13 RX ORDER — GLIPIZIDE 10 MG/1
TABLET, FILM COATED, EXTENDED RELEASE ORAL
Qty: 180 TABLET | Refills: 3 | Status: SHIPPED
Start: 2021-09-13 | End: 2022-03-07

## 2021-09-13 RX ORDER — PREDNISONE 10 MG/1
TABLET ORAL
Qty: 18 TABLET | Refills: 0 | Status: SHIPPED
Start: 2021-09-13 | End: 2021-11-04 | Stop reason: ALTCHOICE

## 2021-09-13 RX ORDER — LIRAGLUTIDE 6 MG/ML
1.8 INJECTION SUBCUTANEOUS DAILY
Qty: 9 PEN | Refills: 5 | Status: SHIPPED
Start: 2021-09-13 | End: 2022-06-27 | Stop reason: SDUPTHER

## 2021-09-13 RX ORDER — AZITHROMYCIN 250 MG/1
250 TABLET, FILM COATED ORAL SEE ADMIN INSTRUCTIONS
Qty: 6 TABLET | Refills: 0 | Status: SHIPPED | OUTPATIENT
Start: 2021-09-13 | End: 2021-09-18

## 2021-09-13 ASSESSMENT — ENCOUNTER SYMPTOMS
SHORTNESS OF BREATH: 0
DIARRHEA: 0
EYE PAIN: 0
BLOOD IN STOOL: 0
CONSTIPATION: 0
COUGH: 1
VOMITING: 0
SINUS PRESSURE: 0
SORE THROAT: 0
EYE ITCHING: 0
COLOR CHANGE: 0
BACK PAIN: 0
ABDOMINAL PAIN: 0
NAUSEA: 0
WHEEZING: 0
RHINORRHEA: 0
APNEA: 0
EYE REDNESS: 0
CHEST TIGHTNESS: 0

## 2021-09-13 NOTE — PROGRESS NOTES
Chief Complaint:     Chief Complaint   Patient presents with    Chest Congestion     Started Saturday. Ibuprofen     Sweats    Fatigue    Headache    Fever     101.3 Saturday         Fever   This is a new problem. The current episode started in the past 7 days. The problem occurs intermittently. The problem has been waxing and waning. The maximum temperature noted was 101 to 101.9 F. Associated symptoms include congestion, coughing, headaches and muscle aches. Pertinent negatives include no abdominal pain, chest pain, diarrhea, ear pain, nausea, rash, sore throat, urinary pain, vomiting or wheezing. She has tried acetaminophen and NSAIDs for the symptoms. The treatment provided moderate relief. Risk factors: sick contacts    Risk factors: no recent travel        Patient Active Problem List   Diagnosis    GERD (gastroesophageal reflux disease)    Essential hypertension    Type 2 diabetes mellitus without complication, without long-term current use of insulin (HCC)    Familial hyperlipidemia    Irritable bowel syndrome with diarrhea    Dysfunction of both eustachian tubes    DUB (dysfunctional uterine bleeding)    Fatigue       Past Medical History:   Diagnosis Date    Diabetes mellitus ([facility] [address]     Fatty liver disease, nonalcoholic     GERD (gastroesophageal reflux disease)     Heart palpitations     Migraine     PCOS (polycystic ovarian syndrome)     Post partum depression        Past Surgical History:   Procedure Laterality Date     SECTION      DILATION AND CURETTAGE OF UTERUS         Current Outpatient Medications   Medication Sig Dispense Refill    VICTOZA 18 MG/3ML SOPN SC injection Inject 1.8 mg into the skin daily 9 pen 5    metFORMIN (GLUCOPHAGE) 1000 MG tablet TAKE 1 TABLET TWICE DAILY  WITH MEALS 180 tablet 3    insulin lispro (HUMALOG) 100 UNIT/ML injection vial Via insulin pump.  MAX 75 Units/day 30 mL 6    glipiZIDE (GLUCOTROL XL) 10 MG extended release tablet TAKE 1 TABLET TWICE A  tablet 3    predniSONE (DELTASONE) 10 MG tablet 30mg daily x3 days, then 20mg daily x3 days, then 10mg daily x3 days 18 tablet 0    azithromycin (ZITHROMAX) 250 MG tablet Take 1 tablet by mouth See Admin Instructions for 5 days 500mg on day 1 followed by 250mg on days 2 - 5 6 tablet 0    ferrous sulfate (IRON 325) 325 (65 Fe) MG tablet Take 1 tablet by mouth daily (with breakfast) 90 tablet 1    DEXILANT 60 MG CPDR delayed release capsule TAKE 1 CAPSULE DAILY 90 capsule 3    lisinopril (PRINIVIL;ZESTRIL) 20 MG tablet TAKE 1 TABLET DAILY 90 tablet 3    rosuvastatin (CRESTOR) 5 MG tablet TAKE 1 TABLET NIGHTLY 90 tablet 3    venlafaxine (EFFEXOR XR) 150 MG extended release capsule Take 1 capsule by mouth daily 90 capsule 3    Insulin Infusion Pump (MINIMED 770G INSULIN PUMP SYS) KIT To infuse insulin 1 kit 0    Continuous Blood Gluc Transmit ([place] LINK 3 TRANSMITTER) MISC To use with sensors 1 each 0    Continuous Blood Gluc Sensor (GUARDIAN SENSOR 3) MISC To change every 7 days 12 each 3    Insulin Pen Needle (BD PEN NEEDLE WEST U/F) 32G X 4 MM MISC Uses with insulin 4 times a day 250 each 5    albuterol sulfate  (90 Base) MCG/ACT inhaler Inhale 2 puffs into the lungs every 4 hours as needed for Wheezing 1 Inhaler 0    blood glucose test strips (ASCENSIA AUTODISC VI;ONE TOUCH ULTRA TEST VI) strip 1 each by In Vitro route 3 times daily 250 each 5    butalbital-acetaminophen-caffeine (FIORICET, ESGIC) -40 MG per tablet Take 1 tablet by mouth every 6 hours as needed for Headaches or Migraine 90 tablet 3    Insulin Pen Needle (BD ULTRA-FINE PEN NEEDLES) 29G X 12.7MM MISC 1 each by Does not apply route 2 times daily Bd ultrafine needles to use with victoza pen 200 each 3     No current facility-administered medications for this visit.        No Known Allergies    Social History     Socioeconomic History    Marital status:      Spouse name: None    Number of children: None    Years of education: None    Highest education level: None   Occupational History     Employer: [employer]   Tobacco Use    Smoking status: Former Smoker     Packs/day: 2.00     Years: 10.00     Pack years: 20.00     Types: Cigarettes     Quit date: 2016     Years since quittin.8    Smokeless tobacco: Never Used    Tobacco comment: Sometimes smokes cigars   Vaping Use    Vaping Use: Former   Substance and Sexual Activity    Alcohol use: No     Comment: occ.  Drug use: No    Sexual activity: Yes     Partners: Male   Other Topics Concern    None   Social History Narrative    None     Social Determinants of Health     Financial Resource Strain:     Difficulty of Paying Living Expenses:    Food Insecurity:     Worried About Running Out of Food in the Last Year:     [address] in the Last Year:    Transportation Needs:     Lack of Transportation (Medical):  Lack of Transportation (Non-Medical):    Physical Activity:     Days of Exercise per Week:     Minutes of Exercise per Session:    Stress:     Feeling of Stress :    Social Connections:     Frequency of Communication with Friends and Family:     Frequency of Social Gatherings with Friends and Family:     Attends Rastafarian Services:     Active Member of Clubs or Organizations:     Attends Club or Organization Meetings:     Marital Status:    Intimate Partner Violence:     Fear of Current or Ex-Partner:     Emotionally Abused:     Physically Abused:     Sexually Abused:        Family History   Problem Relation Age of Onset    High Blood Pressure Mother     High Blood Pressure Father     Diabetes Father     Cancer Maternal Grandmother         unknown if it was uterine, ovarian or cervix     Diabetes Paternal Grandfather           Review of Systems   Constitutional: Positive for fatigue and fever. Negative for activity change and appetite change. HENT: Positive for congestion.  Negative for ear pain, hearing loss, nosebleeds, rhinorrhea, sinus pressure and sore throat. Eyes: Negative for pain, redness, itching and visual disturbance. Respiratory: Positive for cough. Negative for apnea, chest tightness, shortness of breath and wheezing. Cardiovascular: Negative for chest pain, palpitations and leg swelling. Gastrointestinal: Negative for abdominal pain, blood in stool, constipation, diarrhea, nausea and vomiting. Endocrine: Negative. Genitourinary: Negative for decreased urine volume, difficulty urinating, dysuria, frequency, hematuria and urgency. Musculoskeletal: Negative for arthralgias, back pain, gait problem, myalgias and neck pain. Skin: Negative for color change and rash. Allergic/Immunologic: Negative for environmental allergies and food allergies. Neurological: Positive for headaches. Negative for dizziness, weakness, light-headedness and numbness. Hematological: Negative for adenopathy. Does not bruise/bleed easily. Psychiatric/Behavioral: Negative for behavioral problems, dysphoric mood and sleep disturbance. The patient is not nervous/anxious and is not hyperactive. All other systems reviewed and are negative. /76   Pulse 98   Temp 98 °F (36.7 °C)   Wt 233 lb (105.7 kg)   SpO2 97%   BMI 36.49 kg/m²     Physical Exam  Vitals and nursing note reviewed. Constitutional:       General: She is not in acute distress. Appearance: Normal appearance. She is well-developed. HENT:      Head: Normocephalic and atraumatic. Right Ear: Hearing, tympanic membrane and external ear normal. No tenderness. No middle ear effusion. Left Ear: Hearing, tympanic membrane and external ear normal. No tenderness. No middle ear effusion. Nose: Nose normal. No congestion or rhinorrhea. Right Turbinates: Not enlarged. Left Turbinates: Not enlarged. Mouth/Throat:      Mouth: Mucous membranes are moist.      Tongue: No lesions.       Pharynx: Oropharynx is clear. No oropharyngeal exudate or posterior oropharyngeal erythema. Eyes:      General: No scleral icterus. Conjunctiva/sclera: Conjunctivae normal.      Pupils: Pupils are equal, round, and reactive to light. Neck:      Thyroid: No thyromegaly. Cardiovascular:      Rate and Rhythm: Normal rate and regular rhythm. Heart sounds: Normal heart sounds. No murmur heard. Pulmonary:      Effort: Pulmonary effort is normal. No respiratory distress. Breath sounds: Normal breath sounds. No wheezing or rales. Abdominal:      General: Bowel sounds are normal. There is no distension. Palpations: Abdomen is soft. Tenderness: There is no abdominal tenderness. Musculoskeletal:         General: No tenderness. Normal range of motion. Cervical back: Normal range of motion and neck supple. No rigidity. No muscular tenderness. Lymphadenopathy:      Cervical: No cervical adenopathy. Skin:     General: Skin is warm and dry. Findings: No erythema or rash. Neurological:      General: No focal deficit present. Mental Status: She is alert and oriented to person, place, and time. Cranial Nerves: No cranial nerve deficit. Deep Tendon Reflexes: Reflexes are normal and symmetric. Reflexes normal.   Psychiatric:         Mood and Affect: Mood normal.                                 ASSESSMENT/PLAN:    Patient Active Problem List   Diagnosis    GERD (gastroesophageal reflux disease)    Essential hypertension    Type 2 diabetes mellitus without complication, without long-term current use of insulin ([facility] [address]    Familial hyperlipidemia    Irritable bowel syndrome with diarrhea    Dysfunction of both eustachian tubes    DUB (dysfunctional uterine bleeding)    Fatigue       Reinaldo Fatima was seen today for chest congestion, sweats, fatigue, headache and fever. Diagnoses and all orders for this visit:    Bronchitis  -     Covid-19 Ambulatory;  Future  -     azithromycin (ZITHROMAX) 250 MG tablet; Take 1 tablet by mouth See Admin Instructions for 5 days 500mg on day 1 followed by 250mg on days 2 - 5    Other orders  -     predniSONE (DELTASONE) 10 MG tablet; 30mg daily x3 days, then 20mg daily x3 days, then 10mg daily x3 days          Return if symptoms worsen or fail to improve. I spent 20 minutes with this patient. I spent greater than 50% of the time counseling this patient.         Sonya Nguyen DO  9/13/2021  12:57 PM

## 2021-09-13 NOTE — LETTER
[facility]  [address]  Gerry WOOTEN [address]  Phone: [phone]  Fax: [phone], DO        September 13, 2021     Patient: Yue Woodall   YOB: 1975   Date of Visit: 9/13/2021       To Whom It May Concern: It is my medical opinion that Theresa Solomon should remain out of work until her COVID tests results are available. (expected on 9/16/21). If you have any questions or concerns, please don't hesitate to call.     Sincerely,        Pradeep Stallings, DO

## 2021-09-22 ENCOUNTER — PATIENT MESSAGE (OUTPATIENT)
Dept: PRIMARY CARE CLINIC | Age: 46
End: 2021-09-22

## 2021-09-22 RX ORDER — DEXLANSOPRAZOLE 60 MG/1
CAPSULE, DELAYED RELEASE ORAL
Qty: 90 CAPSULE | Refills: 3 | Status: SHIPPED
Start: 2021-09-22 | End: 2022-08-22

## 2021-09-22 NOTE — TELEPHONE ENCOUNTER
From: Sophie Fischer  To: Bucky Steen DO  Sent: 9/22/2021 6:20 AM EDT  Subject: Prescription Question    Could you please send a refill for dexilaunt to [facility]. They say the didnt get one for that one. Thank you in advance! Have a great day!

## 2021-09-30 ENCOUNTER — HOSPITAL ENCOUNTER (OUTPATIENT)
Dept: DIABETES SERVICES | Age: 46
Setting detail: THERAPIES SERIES
Discharge: HOME | End: 2021-09-30
Payer: COMMERCIAL

## 2021-09-30 VITALS — BODY MASS INDEX: 35.47 KG/M2 | WEIGHT: 226 LBS | HEIGHT: 67 IN

## 2021-09-30 PROCEDURE — 97802 MEDICAL NUTRITION INDIV IN: CPT

## 2021-09-30 ASSESSMENT — PROBLEM AREAS IN DIABETES QUESTIONNAIRE (PAID)
WORRYING ABOUT THE FUTURE AND THE POSSIBILITY OF SERIOUS COMPLICATIONS: 0
FEELING DEPRESSED WHEN YOU THINK ABOUT LIVING WITH DIABETES: 0
FEELING SCARED WHEN YOU THINK ABOUT LIVING WITH DIABETES: 0
FEELING THAT DIABETES IS TAKING UP TOO MUCH OF YOUR MENTAL AND PHYSICAL ENERGY EVERY DAY: 0
PAID-5 TOTAL SCORE: 0
COPING WITH COMPLICATIONS OF DIABETES: 0

## 2021-09-30 NOTE — PROGRESS NOTES
Diabetes Self-Management Education Record    Participant Name: Laura Moran  Referring Provider: Randolph Mims DO  Assessment/Evaluation Ratings:  1=Needs Instruction   4=Demonstrates Understanding/Competency  2=Needs Review   NC=Not Covered    3=Comprehends Key Points  N/A=Not Applicable  Topics/Learning Objectives Pre-session Assess Date:  Instructor initials/date  9/30/21KC Instr. Date    Instructor initials/date  9/30/21KC Follow-up Post- session Eval Comments   Diabetes disease process & Treatment process:   -Define type of diabetes in simple terms.  - Describe the ABCs of  diabetes management  -Identify own type of diabetes  -Identify lifestyle changes/treatment options  -other:  3 [] All     []  []  []  []  []   9/30/21KC: Pt type 2 dm. Pt had DM education in 2019    Developing strategies for Healthy coping/psychosocial issues:    -Describe feelings about living with diabetes  -Identify coping strategies and sources of stress  -Identify support needed & support network available  -Complete PAID-5 Diabetes questionnaire 1 [] All     []  []  []    [x]  3 9/30/21KC: PAID 5=0   Prevention, detection & treatment of Chronic complications:    -Identify the prevention, detection and treatment for complications including immunizations, preventive eye, foot, dental and renal exams as indicated per the participant's duration of diabetes and health status.  -Define the natural course of diabetes and the relationship of blood glucose levels to long term complications of diabetes.   3 [] All     []            []      Prevention, detection & treatment of acute complications:    -State the causes,signs & symptoms of hyper & hypoglycemia, and prevention & treatment strategies.   -Describe sick day guidelines  DKA /indications for ketone testing &  when to call physician  3 [] All     []      []        -Identify severe weather/situation crisis  & diabetes supplies management  []      Using medications safely:   -[id number] effects of diabetes medicines on blood glucose levels;  -List diabetes medication taken, action & side effects 3 [] All     []  []      Insulin/Injectables/glucagon  -Name appropriate injection sites; proper storage; supplies needed;     []       Demonstrate proper technique  []      Monitoring blood glucose, interpreting and using results:   -Identify the purpose of testing   -Identify recommended & personal blood glucose targets & HgbA1C target levels  -State the Importance of logging blood glucose levels for pattern recognition;   -State benefits of reading/using pt generated health data  -Verbalize safe lancet disposal 3 [] All     []  []    []  []  []      -Demonstrate proper testing technique  []      Incorporating physical activity into lifestyle:   -State effect of exercise on blood glucose levels;   -State benefits of regular exercise;   -Define safety considerations/food choices if needed.  -Describe contraindications/maintenance of activity. 3 [] All     []  []    []  []      Incorporating nutritional management into lifestyle:   -Describe effect of type, amount & timing of food on blood glucose  -Describe methods for preparing and planning healthy meals  -Correctly read food labels  -Name 3 foods high in Carbohydrate 2 [x] All       []    []    []  []  4 9/30/21KC: Instructed pt on 1400 calorie diet with 10 total carbohydrate servings per day. 2 carbohydrate choices for breakfast, 3 carbohydrate choices for lunch, 3 carbohydrate choices for dinner, 1 carbohydrate choices for AM/HS snack. Discussed recommended weight goals and water intakes. Discussed different types of carbohydrates and choosing higher quality carbohydrates with more fiber. Educated pt on lean proteins and low fat. Educated pt on label reading and how to apply grams of total carbohydrates into carbohydrate servings to comply with meal plan. Encouraged pt to use measuring cups for portion control. Instructed pt to call with any questions. -Plan a carbohydrate-controlled meal based on individualized meal plan  -Demonstrate CHO counting/portion control   []  []      Developing strategies for problem solving to promote health/change behavior. -Identify 7 self-care behaviors; Personal health risk factors; Benefits, challenges & strategies for behavioral change and set an individualized goal selection. 1       []      []Nutrition  []Monitoring  []Exercise  []Medication  []Other     Identified Barriers to learning/adherence to self management plan:    None  []  other    Instruction Method:  Lecture/Discussion and Handouts    Supporting Education Materials/Equipment Provided: Meal Plan and Nutritional Packet   []Amharic materials       [] services     []Other:      Encounter Type Date Attended Start Time End Time Comments No Show Dates   Assessment          Session 1         Session 2        1:1 DSMES          In person Follow-up         Gestational Diabetes         Individual MNT 9/30/21KC [id number] In person    Meter Instrx        Insulin Instrx           Additional Comments: [] Pt seen individually due to Covid-19 Safety precautions and no group session available.         Date:   Follow-up goal attainment based on patients initial DSMES goal    Dr Notified by [] EMR []Fax        []Post class Hgb A1C  []Medication compliance   []Plate method/meal plan compliance   []Able to state the number of Carbohydrate servings eaten at B,L,D   []Testing blood glucose as prescribed by PCP   []Exercise Routine   []Other:   []Other:     []Patient lost to follow-up  Dr Notified by []EMR []Fax     Personal Support Plan:      [] Keep all scheduled doctor appointments   [] Make and keep appointments with specialists (foot, eye, dentist) as recommended   [] Consult my pharmacist about all new medications or to ask any medication questions   [] Get tested for sleep apnea   [] Seek help for:   [] Make an appointment with:   [] Attend smoking cessation classes or

## 2021-10-19 RX ORDER — BUPROPION HYDROCHLORIDE 150 MG/1
150 TABLET ORAL EVERY MORNING
Qty: 30 TABLET | Refills: 3 | Status: SHIPPED
Start: 2021-10-19 | End: 2021-11-10

## 2021-10-29 DIAGNOSIS — Z79.4 TYPE 2 DIABETES MELLITUS WITHOUT COMPLICATION, WITH LONG-TERM CURRENT USE OF INSULIN (HCC): ICD-10-CM

## 2021-10-29 DIAGNOSIS — E55.9 VITAMIN D DEFICIENCY: ICD-10-CM

## 2021-10-29 DIAGNOSIS — E11.9 TYPE 2 DIABETES MELLITUS WITHOUT COMPLICATION, WITH LONG-TERM CURRENT USE OF INSULIN (HCC): ICD-10-CM

## 2021-10-29 LAB
ALBUMIN SERPL-MCNC: 4.5 G/DL (ref 3.5–5.2)
ALP BLD-CCNC: 68 U/L (ref 35–104)
ALT SERPL-CCNC: 19 U/L (ref 0–32)
ANION GAP SERPL CALCULATED.3IONS-SCNC: 18 MMOL/L (ref 7–16)
AST SERPL-CCNC: 19 U/L (ref 0–31)
BILIRUB SERPL-MCNC: <0.2 MG/DL (ref 0–1.2)
BUN BLDV-MCNC: 13 MG/DL (ref 6–20)
CALCIUM SERPL-MCNC: 9.4 MG/DL (ref 8.6–10.2)
CHLORIDE BLD-SCNC: 101 MMOL/L (ref 98–107)
CHOLESTEROL, TOTAL: 115 MG/DL (ref 0–199)
CO2: 21 MMOL/L (ref 22–29)
CREAT SERPL-MCNC: 0.7 MG/DL (ref 0.5–1)
CREATININE URINE: 52 MG/DL (ref 29–226)
GFR AFRICAN AMERICAN: >60
GFR NON-AFRICAN AMERICAN: >60 ML/MIN/1.73
GLUCOSE BLD-MCNC: 158 MG/DL (ref 74–99)
HBA1C MFR BLD: 6.9 % (ref 4–5.6)
HDLC SERPL-MCNC: 35 MG/DL
LDL CHOLESTEROL CALCULATED: 59 MG/DL (ref 0–99)
MICROALBUMIN UR-MCNC: 15 MG/L
MICROALBUMIN/CREAT UR-RTO: 28.8 (ref 0–30)
POTASSIUM SERPL-SCNC: 4.4 MMOL/L (ref 3.5–5)
SODIUM BLD-SCNC: 140 MMOL/L (ref 132–146)
TOTAL PROTEIN: 6.8 G/DL (ref 6.4–8.3)
TRIGL SERPL-MCNC: 105 MG/DL (ref 0–149)
TSH SERPL DL<=0.05 MIU/L-ACNC: 0.76 UIU/ML (ref 0.27–4.2)
VITAMIN D 25-HYDROXY: 79 NG/ML (ref 30–100)
VLDLC SERPL CALC-MCNC: 21 MG/DL

## 2021-11-04 ENCOUNTER — OFFICE VISIT (OUTPATIENT)
Dept: ENDOCRINOLOGY | Age: 46
End: 2021-11-04
Payer: COMMERCIAL

## 2021-11-04 VITALS
WEIGHT: 232 LBS | HEIGHT: 67 IN | BODY MASS INDEX: 36.41 KG/M2 | DIASTOLIC BLOOD PRESSURE: 87 MMHG | SYSTOLIC BLOOD PRESSURE: 137 MMHG | HEART RATE: 99 BPM

## 2021-11-04 DIAGNOSIS — E78.5 HYPERLIPIDEMIA, UNSPECIFIED HYPERLIPIDEMIA TYPE: ICD-10-CM

## 2021-11-04 DIAGNOSIS — E11.9 TYPE 2 DIABETES MELLITUS WITHOUT COMPLICATION, WITHOUT LONG-TERM CURRENT USE OF INSULIN (HCC): Primary | ICD-10-CM

## 2021-11-04 DIAGNOSIS — Z79.4 TYPE 2 DIABETES MELLITUS WITHOUT COMPLICATION, WITH LONG-TERM CURRENT USE OF INSULIN (HCC): ICD-10-CM

## 2021-11-04 DIAGNOSIS — E55.9 VITAMIN D DEFICIENCY: ICD-10-CM

## 2021-11-04 DIAGNOSIS — E11.9 TYPE 2 DIABETES MELLITUS WITHOUT COMPLICATION, WITH LONG-TERM CURRENT USE OF INSULIN (HCC): ICD-10-CM

## 2021-11-04 PROCEDURE — 99214 OFFICE O/P EST MOD 30 MIN: CPT | Performed by: INTERNAL MEDICINE

## 2021-11-04 PROCEDURE — 95251 CONT GLUC MNTR ANALYSIS I&R: CPT | Performed by: INTERNAL MEDICINE

## 2021-11-04 NOTE — PROGRESS NOTES
[address] [place] [address] Department of Endocrinology Diabetes and Metabolism   [address] [facility] [address]   Phone: [phone]  Fax: [phone]\    Date of Service: 2021  Primary Care Physician: Arianna Moran DO  Provider: Shahana Najera MD     Reason for the visit:  DM type 2     History of Present Illness: The history is provided by the patient. No  was used. Accuracy of the patient data is excellent. Jonathan Connell is a very pleasant 42 y.o. female with past medical history of PCOS, HTN and Obesity seen today for follow up visit     Jonathan Connell was diagnosed with diabetes early 35s.  Started as gestational diabetes and she is currently on 5g Med[employer] insulin pump with CGM   Current pump settings: Basal rate 0.7, CR 12a 11, 10a 12, 1p 12, ISF 56, goal 100-150, active insulin time 3 hrs   Lab Results   Component Value Date    LABA1C 6.9 10/29/2021    LABA1C 7.5 2021    LABA1C 7.3 10/29/2020      Patient has had no hypoglycemic episodes   Very good with following diabetes diet and encouraged   I reviewed current medications and the patient has no issues with diabetes medications  Jonathan Connell is up to date with eye exam and denied any history of diabetic retinopathy, retinopathy   The patient performs her own feet care and doesn't see podiatry   Microvascular complications:  No Retinopathy, Nephropathy or Neuropathy   Macrovascular complications: no CAD, PVD, or Stroke  On statin     PAST MEDICAL HISTORY   Past Medical History:   Diagnosis Date    Diabetes mellitus ([address]     Fatty liver disease, nonalcoholic     GERD (gastroesophageal reflux disease)     Heart palpitations     Migraine     PCOS (polycystic ovarian syndrome)     Post partum depression      PAST SURGICAL HISTORY   Past Surgical History:   Procedure Laterality Date     SECTION      DILATION AND CURETTAGE OF UTERUS       SOCIAL HISTORY   Tobacco:   reports that she quit smoking about 4 years ago. Her smoking use included cigarettes. She has a 20.00 pack-year smoking history. She has never used smokeless tobacco.  Alcol:   reports no history of alcohol use. Illicit Drugs:   reports no history of drug use. FAMILY HISTORY   Family History   Problem Relation Age of Onset    High Blood Pressure Mother     High Blood Pressure Father     Diabetes Father     Cancer Maternal Grandmother         unknown if it was uterine, ovarian or cervix     Diabetes Paternal Grandfather      ALLERGIES AND DRUG REACTIONS   No Known Allergies    CURRENT MEDICATIONS   Current Outpatient Medications   Medication Sig Dispense Refill    buPROPion (WELLBUTRIN XL) 150 MG extended release tablet Take 1 tablet by mouth every morning 30 tablet 3    DEXILANT 60 MG CPDR delayed release capsule TAKE 1 CAPSULE DAILY 90 capsule 3    VICTOZA 18 MG/3ML SOPN SC injection Inject 1.8 mg into the skin daily 9 pen 5    metFORMIN (GLUCOPHAGE) 1000 MG tablet TAKE 1 TABLET TWICE DAILY  WITH MEALS 180 tablet 3    insulin lispro (HUMALOG) 100 UNIT/ML injection vial Via insulin pump.  MAX 75 Units/day 30 mL 6    glipiZIDE (GLUCOTROL XL) 10 MG extended release tablet TAKE 1 TABLET TWICE A  tablet 3    ferrous sulfate (IRON 325) 325 (65 Fe) MG tablet Take 1 tablet by mouth daily (with breakfast) 90 tablet 1    lisinopril (PRINIVIL;ZESTRIL) 20 MG tablet TAKE 1 TABLET DAILY 90 tablet 3    rosuvastatin (CRESTOR) 5 MG tablet TAKE 1 TABLET NIGHTLY 90 tablet 3    venlafaxine (EFFEXOR XR) 150 MG extended release capsule Take 1 capsule by mouth daily 90 capsule 3    Insulin Infusion Pump (MINIMED 770G INSULIN PUMP SYS) KIT To infuse insulin 1 kit 0    Continuous Blood Gluc Transmit (GUARDIAN LINK 3 TRANSMITTER) MISC To use with sensors 1 each 0    Continuous Blood Gluc Sensor (GUARDIAN SENSOR 3) MISC To change every 7 days 12 each 3    Insulin Pen Needle (BD PEN NEEDLE WEST U/F) 32G X 4 MM MISC Uses with insulin 4 times a day 250 each 5    albuterol sulfate  (90 Base) MCG/ACT inhaler Inhale 2 puffs into the lungs every 4 hours as needed for Wheezing 1 Inhaler 0    blood glucose test strips (ASCENSIA AUTODISC VI;ONE TOUCH ULTRA TEST VI) strip 1 each by In Vitro route 3 times daily 250 each 5    butalbital-acetaminophen-caffeine (FIORICET, ESGIC) -40 MG per tablet Take 1 tablet by mouth every 6 hours as needed for Headaches or Migraine 90 tablet 3    Insulin Pen Needle (BD ULTRA-FINE PEN NEEDLES) 29G X 12.7MM MISC 1 each by Does not apply route 2 times daily Bd ultrafine needles to use with victoza pen 200 each 3     No current facility-administered medications for this visit. Review of Systems  Constitutional: No fever, no chills, no diaphoresis, no generalized weakness. HEENT: No blurred vision, No sore throat, no ear pain, no hair loss  Neck: denied any neck swelling, difficulty swallowing,   Cardio-pulmonary: No CP, SOB or palpitation, No orthopnea or PND. No cough or wheezing. GI: No N/V/D, no constipation, No abdominal pain, no melena or hematochezia   : Denied any dysuria, hematuria, flank pain, discharge, or incontinence. Skin: denied any rash, ulcer, Hirsute, or hyperpigmentation. MSK: denied any joint deformity, joint pain/swelling, muscle pain, or back pain.   Neuro: no numbness, no tingling, no weakness, _    OBJECTIVE    /87   Pulse 99   Ht 5' 7\" (1.702 m)   Wt 232 lb (105.2 kg)   BMI 36.34 kg/m²   BP Readings from Last 4 Encounters:   11/04/21 137/87   09/13/21 120/76   07/29/21 124/76   05/20/21 133/89     Wt Readings from Last 6 Encounters:   11/04/21 232 lb (105.2 kg)   09/30/21 226 lb (102.5 kg)   09/13/21 233 lb (105.7 kg)   07/29/21 231 lb (104.8 kg)   05/20/21 229 lb 6.4 oz (104.1 kg)   02/19/21 235 lb (106.6 kg)     Physical examination:  General: awake alert, oriented x3  HEENT: normocephalic non traumatic, no exophthalmos   Neck: supple, thyroid tenderness,  Pulm: Clear equal air entry no added sounds  CVS: S1 + S2  Abd: soft lax, no tenderness  Skin: warm, no lesions, no rash. No open wounds, no ulcers   Neuro: CN intact, sensation present bilateral , muscle power normal  Psych: normal mood, and affect    Review of Laboratory Data:  I personally reviewed the following lab:  Lab Results   Component Value Date/Time    WBC 9.4 08/13/2021 09:59 AM    RBC 4.80 08/13/2021 09:59 AM    HGB 10.2 (L) 08/13/2021 09:59 AM    HCT 35.3 08/13/2021 09:59 AM    MCV 73.5 (L) 08/13/2021 09:59 AM    MCH 21.3 (L) 08/13/2021 09:59 AM    MCHC 28.9 (L) 08/13/2021 09:59 AM    RDW 19.8 (H) 08/13/2021 09:59 AM     08/13/2021 09:59 AM    MPV 12.6 (H) 08/13/2021 09:59 AM      Lab Results   Component Value Date/Time     10/29/2021 07:55 AM    K 4.4 10/29/2021 07:55 AM    CO2 21 (L) 10/29/2021 07:55 AM    BUN 13 10/29/2021 07:55 AM    CREATININE 0.7 10/29/2021 07:55 AM    CALCIUM 9.4 10/29/2021 07:55 AM    LABGLOM >60 10/29/2021 07:55 AM    GFRAA >60 10/29/2021 07:55 AM      Lab Results   Component Value Date/Time    TSH 0.762 10/29/2021 07:55 AM    T4FREE 1.34 10/29/2020 08:59 AM     Lab Results   Component Value Date    LABA1C 6.9 10/29/2021    GLUCOSE 158 10/29/2021    GLUCOSE 124 02/01/2012    MALBCR 28.8 10/29/2021    LABMICR 15.0 10/29/2021    LABCREA 52 10/29/2021     Lab Results   Component Value Date    LABA1C 6.9 10/29/2021    LABA1C 7.5 05/20/2021    LABA1C 7.3 10/29/2020     Lab Results   Component Value Date    CHOL 115 10/29/2021    CHOL 125 07/29/2021    TRIG 105 10/29/2021    TRIG 131 07/29/2021    HDL 35 10/29/2021    HDL 38 07/29/2021     Lab Results   Component Value Date    VITD25 79 10/29/2021    VITD25 94 07/02/2020     ASSESSMENT & RECOMMENDATIONS   Adelia Levo, a 42 y.o.-old female seen in for the following issues     Diabetes Mellitus Type 2     · Under good control , A1c 6.9   · I have reviewed both pump and CGM download.  Continue current pump settings:   Basal rate 0.7, CR 12a 11, 10a 12, 1p 12, ISF 56, goal 100-150, active insulin time 3 hrs   · Continue using CGM   · Discussed with patient A1c and blood sugar goals   · Patient up to date with the routine diabetes maintenance and prevention     Continuous Glucose Monitoring (CGM) download and interpretation    I personally reviewed and interpreted continuous glucose monitor (CGM) download. CGM report was discussed with patient including blood glucose patterns, percentages of blood glucose at goal, above goal and below goal. Insulin dosages/antidiabetic regimen was adjusted according to CGM download. Full CGM was scanned under media. HLD  · Continue Crestor 5 mg daily     VitD deficiency   · Continue vitD supplement     I personally reviewed external notes from PCP and other patient's care team providers, and personally interpreted labs associated with the above diagnosis. I also ordered labs to further assess and manage the above addressed medical conditions    Return in about 4 months (around 3/4/2022) for DM type 2, VitD deficiency. The above issues were reviewed with the patient who understood and agreed with the plan. Thank you for allowing us to participate in the care of this patient. Please do not hesitate to contact us with any additional questions. Diagnosis Orders   1. Type 2 diabetes mellitus without complication, without long-term current use of insulin (HCC)  Hemoglobin A1C    MD CONTINUOUS GLUCOSE MONITORING ANALYSIS I&R   2. Type 2 diabetes mellitus without complication, with long-term current use of insulin ([facility] [address]     3. Vitamin D deficiency     4. Hyperlipidemia, unspecified hyperlipidemia type         Ezequiel Schultz MD  Endocrinologist, [facility])   [address] [place] [address], [address] [place] [address],[facility] [id number]   Phone: [phone]  Fax: [phone]  --------------------------------------------  An electronic signature was used to authenticate this note.  Jose Alejandro Chase MD on 11/4/2021 at 11:01 AM

## 2021-11-10 RX ORDER — BUPROPION HYDROCHLORIDE 150 MG/1
TABLET ORAL
Qty: 90 TABLET | Refills: 1 | Status: SHIPPED
Start: 2021-11-10 | End: 2022-02-07 | Stop reason: SDUPTHER

## 2021-11-11 ENCOUNTER — OFFICE VISIT (OUTPATIENT)
Dept: PRIMARY CARE CLINIC | Age: 46
End: 2021-11-11
Payer: COMMERCIAL

## 2021-11-11 VITALS
BODY MASS INDEX: 36.41 KG/M2 | RESPIRATION RATE: 16 BRPM | SYSTOLIC BLOOD PRESSURE: 124 MMHG | HEIGHT: 67 IN | OXYGEN SATURATION: 98 % | WEIGHT: 232 LBS | HEART RATE: 84 BPM | DIASTOLIC BLOOD PRESSURE: 76 MMHG

## 2021-11-11 DIAGNOSIS — R53.83 FATIGUE, UNSPECIFIED TYPE: Primary | ICD-10-CM

## 2021-11-11 DIAGNOSIS — F32.0 CURRENT MILD EPISODE OF MAJOR DEPRESSIVE DISORDER WITHOUT PRIOR EPISODE (HCC): ICD-10-CM

## 2021-11-11 PROCEDURE — 99213 OFFICE O/P EST LOW 20 MIN: CPT | Performed by: FAMILY MEDICINE

## 2021-11-11 SDOH — ECONOMIC STABILITY: FOOD INSECURITY: WITHIN THE PAST 12 MONTHS, YOU WORRIED THAT YOUR FOOD WOULD RUN OUT BEFORE YOU GOT MONEY TO BUY MORE.: NEVER TRUE

## 2021-11-11 SDOH — ECONOMIC STABILITY: FOOD INSECURITY: WITHIN THE PAST 12 MONTHS, THE FOOD YOU BOUGHT JUST DIDN'T LAST AND YOU DIDN'T HAVE MONEY TO GET MORE.: NEVER TRUE

## 2021-11-11 ASSESSMENT — ENCOUNTER SYMPTOMS
ABDOMINAL PAIN: 0
EYE PAIN: 0
COLOR CHANGE: 0
RHINORRHEA: 0
EYE REDNESS: 0
DIARRHEA: 0
SHORTNESS OF BREATH: 0
SINUS PRESSURE: 0
CONSTIPATION: 0
WHEEZING: 0
EYE ITCHING: 0
SORE THROAT: 0
BLOOD IN STOOL: 0
BACK PAIN: 0
VOMITING: 0
NAUSEA: 0
COUGH: 0
CHEST TIGHTNESS: 0
APNEA: 0

## 2021-11-11 ASSESSMENT — SOCIAL DETERMINANTS OF HEALTH (SDOH): HOW HARD IS IT FOR YOU TO PAY FOR THE VERY BASICS LIKE FOOD, HOUSING, MEDICAL CARE, AND HEATING?: NOT HARD AT ALL

## 2021-11-11 NOTE — PROGRESS NOTES
Chief Complaint:     Chief Complaint   Patient presents with    Depression    Mental Health Problem    Fatigue         Mental Health Problem  The primary symptoms include dysphoric mood. The current episode started more than 1 month ago. This is a new problem. The dysphoric mood began more than 2 weeks ago. The mood has been improving since its onset. She characterizes the problem as moderate. The mood includes feelings of sadness. Her change in mood was precipitated by a stressful event. The onset of the illness is precipitated by a stressful event and emotional stress. The degree of incapacity that she is experiencing as a consequence of her illness is mild. Additional symptoms of the illness include fatigue. Additional symptoms of the illness do not include anhedonia, insomnia, hypersomnia, appetite change, unexpected weight change, headaches or abdominal pain. She does not admit to suicidal ideas. She does not have a plan to attempt suicide. She does not contemplate harming herself. She has not already injured self. She does not contemplate injuring another person. Risk factors that are present for mental illness include a history of mental illness and a family history of mental illness. Fatigue  This is a recurrent problem. The current episode started more than 1 month ago. The problem occurs intermittently. The problem has been waxing and waning. Associated symptoms include fatigue. Pertinent negatives include no abdominal pain, arthralgias, chest pain, congestion, coughing, fever, headaches, myalgias, nausea, neck pain, numbness, rash, sore throat, vomiting or weakness. The symptoms are aggravated by stress. Treatments tried: wellbutrin. The treatment provided moderate relief.        Patient Active Problem List   Diagnosis    GERD (gastroesophageal reflux disease)    Essential hypertension    Type 2 diabetes mellitus without complication, without long-term current use of insulin ([facility] [address]    Familial hyperlipidemia    Irritable bowel syndrome with diarrhea    Dysfunction of both eustachian tubes    DUB (dysfunctional uterine bleeding)    Fatigue    Current mild episode of major depressive disorder without prior episode ([facility] [address]       Past Medical History:   Diagnosis Date    Diabetes mellitus ([facility] [address]     Fatty liver disease, nonalcoholic     GERD (gastroesophageal reflux disease)     Heart palpitations     Migraine     PCOS (polycystic ovarian syndrome)     Post partum depression        Past Surgical History:   Procedure Laterality Date     SECTION      DILATION AND CURETTAGE OF UTERUS         Current Outpatient Medications   Medication Sig Dispense Refill    buPROPion (WELLBUTRIN XL) 150 MG extended release tablet TAKE 1 TABLET BY MOUTH EVERY DAY IN THE MORNING 90 tablet 1    DEXILANT 60 MG CPDR delayed release capsule TAKE 1 CAPSULE DAILY 90 capsule 3    VICTOZA 18 MG/3ML SOPN SC injection Inject 1.8 mg into the skin daily 9 pen 5    metFORMIN (GLUCOPHAGE) 1000 MG tablet TAKE 1 TABLET TWICE DAILY  WITH MEALS 180 tablet 3    insulin lispro (HUMALOG) 100 UNIT/ML injection vial Via insulin pump.  MAX 75 Units/day 30 mL 6    glipiZIDE (GLUCOTROL XL) 10 MG extended release tablet TAKE 1 TABLET TWICE A  tablet 3    ferrous sulfate (IRON 325) 325 (65 Fe) MG tablet Take 1 tablet by mouth daily (with breakfast) 90 tablet 1    lisinopril (PRINIVIL;ZESTRIL) 20 MG tablet TAKE 1 TABLET DAILY 90 tablet 3    rosuvastatin (CRESTOR) 5 MG tablet TAKE 1 TABLET NIGHTLY 90 tablet 3    venlafaxine (EFFEXOR XR) 150 MG extended release capsule Take 1 capsule by mouth daily 90 capsule 3    Insulin Infusion Pump (MINIMED 770G INSULIN PUMP SYS) KIT To infuse insulin 1 kit 0    Continuous Blood Gluc Transmit (GUARDIAN LINK 3 TRANSMITTER) MISC To use with sensors 1 each 0    Continuous Blood Gluc Sensor (GUARDIAN SENSOR 3) MISC To change every 7 days 12 each 3    Insulin Pen Needle (BD PEN NEEDLE WEST U/F) 32G X 4 MM MISC Uses with insulin 4 times a day 250 each 5    albuterol sulfate  (90 Base) MCG/ACT inhaler Inhale 2 puffs into the lungs every 4 hours as needed for Wheezing 1 Inhaler 0    blood glucose test strips (ASCENSIA AUTODISC VI;ONE TOUCH ULTRA TEST VI) strip 1 each by In Vitro route 3 times daily 250 each 5    butalbital-acetaminophen-caffeine (FIORICET, ESGIC) -40 MG per tablet Take 1 tablet by mouth every 6 hours as needed for Headaches or Migraine 90 tablet 3    Insulin Pen Needle (BD ULTRA-FINE PEN NEEDLES) 29G X 12.7MM MISC 1 each by Does not apply route 2 times daily Bd ultrafine needles to use with victoza pen 200 each 3     No current facility-administered medications for this visit. No Known Allergies    Social History     Socioeconomic History    Marital status:      Spouse name: None    Number of children: None    Years of education: None    Highest education level: None   Occupational History     Employer: [employer]   Tobacco Use    Smoking status: Former Smoker     Packs/day: 2.00     Years: 10.00     Pack years: 20.00     Types: Cigarettes     Quit date: 2016     Years since quittin.0    Smokeless tobacco: Never Used    Tobacco comment: Sometimes smokes cigars   Vaping Use    Vaping Use: Former   Substance and Sexual Activity    Alcohol use: No     Comment: occ.  Drug use: No    Sexual activity: Yes     Partners: Male   Other Topics Concern    None   Social History Narrative    None     Social Determinants of Health     Financial Resource Strain: Low Risk     Difficulty of Paying Living Expenses: Not hard at all   Food Insecurity: No Food Insecurity    Worried About Running Out of Food in the Last Year: Never true    [address] in the Last Year: Never true   Transportation Needs:     Lack of Transportation (Medical): Not on file    Lack of Transportation (Non-Medical):  Not on file   Physical Activity:     Days of Exercise per Week: Not on file    Minutes of Exercise per Session: Not on file   Stress:     Feeling of Stress : Not on file   Social Connections:     Frequency of Communication with Friends and Family: Not on file    Frequency of Social Gatherings with Friends and Family: Not on file    Attends Hindu Services: Not on file    Active Member of [address] [place] [address] [employer] or Organizations: Not on file    Attends Club or Organization Meetings: Not on file    Marital Status: Not on file   Intimate Partner Violence:     Fear of Current or Ex-Partner: Not on file    Emotionally Abused: Not on file    Physically Abused: Not on file    Sexually Abused: Not on file   Housing Stability:     Unable to Pay for Housing in the Last Year: Not on file    Number of [place] in the Last Year: Not on file    Unstable Housing in the Last Year: Not on file       Family History   Problem Relation Age of Onset    High Blood Pressure Mother     High Blood Pressure Father     Diabetes Father     Cancer Maternal Grandmother         unknown if it was uterine, ovarian or cervix     Diabetes Paternal Grandfather           Review of Systems   Constitutional: Positive for fatigue. Negative for activity change, appetite change, fever and unexpected weight change. HENT: Negative for congestion, ear pain, hearing loss, nosebleeds, rhinorrhea, sinus pressure and sore throat. Eyes: Negative for pain, redness, itching and visual disturbance. Respiratory: Negative for apnea, cough, chest tightness, shortness of breath and wheezing. Cardiovascular: Negative for chest pain, palpitations and leg swelling. Gastrointestinal: Negative for abdominal pain, blood in stool, constipation, diarrhea, nausea and vomiting. Endocrine: Negative. Genitourinary: Negative for decreased urine volume, difficulty urinating, dysuria, frequency, hematuria and urgency.    Musculoskeletal: Negative for arthralgias, back pain, gait problem, myalgias and neck pain.   Skin: Negative for color change and rash. Allergic/Immunologic: Negative for environmental allergies and food allergies. Neurological: Negative for dizziness, weakness, light-headedness, numbness and headaches. Hematological: Negative for adenopathy. Does not bruise/bleed easily. Psychiatric/Behavioral: Positive for dysphoric mood. Negative for behavioral problems and sleep disturbance. The patient is not nervous/anxious, does not have insomnia and is not hyperactive. All other systems reviewed and are negative. /76   Pulse 84   Resp 16   Ht 5' 7\" (1.702 m)   Wt 232 lb (105.2 kg)   SpO2 98%   BMI 36.34 kg/m²     Physical Exam  Vitals and nursing note reviewed. Constitutional:       General: She is not in acute distress. Appearance: Normal appearance. She is well-developed. HENT:      Head: Normocephalic and atraumatic. Right Ear: Hearing, tympanic membrane and external ear normal. No tenderness. No middle ear effusion. Left Ear: Hearing, tympanic membrane and external ear normal. No tenderness. No middle ear effusion. Nose: Nose normal. No congestion or rhinorrhea. Right Turbinates: Not enlarged. Left Turbinates: Not enlarged. Mouth/Throat:      Mouth: Mucous membranes are moist.      Tongue: No lesions. Pharynx: Oropharynx is clear. No oropharyngeal exudate or posterior oropharyngeal erythema. Eyes:      General: No scleral icterus. Conjunctiva/sclera: Conjunctivae normal.      Pupils: Pupils are equal, round, and reactive to light. Neck:      Thyroid: No thyromegaly. Cardiovascular:      Rate and Rhythm: Normal rate and regular rhythm. Heart sounds: Normal heart sounds. No murmur heard. Pulmonary:      Effort: Pulmonary effort is normal. No respiratory distress. Breath sounds: Normal breath sounds. No wheezing or rales. Abdominal:      General: Bowel sounds are normal. There is no distension. Palpations: Abdomen is soft. Tenderness: There is no abdominal tenderness. Musculoskeletal:         General: No tenderness. Normal range of motion. Cervical back: Normal range of motion and neck supple. No rigidity. No muscular tenderness. Lymphadenopathy:      Cervical: No cervical adenopathy. Skin:     General: Skin is warm and dry. Findings: No erythema or rash. Neurological:      General: No focal deficit present. Mental Status: She is alert and oriented to person, place, and time. Cranial Nerves: No cranial nerve deficit. Deep Tendon Reflexes: Reflexes are normal and symmetric. Reflexes normal.   Psychiatric:         Mood and Affect: Mood normal.                                 ASSESSMENT/PLAN:    Patient Active Problem List   Diagnosis    GERD (gastroesophageal reflux disease)    Essential hypertension    Type 2 diabetes mellitus without complication, without long-term current use of insulin (HCC)    Familial hyperlipidemia    Irritable bowel syndrome with diarrhea    Dysfunction of both eustachian tubes    DUB (dysfunctional uterine bleeding)    Fatigue    Current mild episode of major depressive disorder without prior episode ([address]       Armen Hull was seen today for depression, mental health problem and fatigue. Diagnoses and all orders for this visit:    Fatigue, unspecified type    Current mild episode of major depressive disorder without prior episode ([address]          Return in about 3 months (around 2/11/2022) for Recheck Meds. I spent 20 minutes with this patient. I spent greater than 50% of the time counseling this patient.         Demetria Del Valle DO  11/11/2021  8:45 AM

## 2022-02-06 ENCOUNTER — PATIENT MESSAGE (OUTPATIENT)
Dept: PRIMARY CARE CLINIC | Age: 47
End: 2022-02-06

## 2022-02-07 RX ORDER — BUPROPION HYDROCHLORIDE 150 MG/1
TABLET ORAL
Qty: 90 TABLET | Refills: 1 | Status: SHIPPED
Start: 2022-02-07 | End: 2022-08-12

## 2022-02-07 NOTE — TELEPHONE ENCOUNTER
From: Sandi Cedillo  To: Dr. Marci Yadav: 2022 12:27 PM EST  Subject: Refill pls? Ic bupropion hcl xl 150mg  I have a week left. Was not sure if i stop this one now or keep taking it.

## 2022-02-10 ENCOUNTER — OFFICE VISIT (OUTPATIENT)
Dept: PRIMARY CARE CLINIC | Age: 47
End: 2022-02-10
Payer: COMMERCIAL

## 2022-02-10 VITALS
HEART RATE: 94 BPM | SYSTOLIC BLOOD PRESSURE: 128 MMHG | BODY MASS INDEX: 36.62 KG/M2 | OXYGEN SATURATION: 99 % | DIASTOLIC BLOOD PRESSURE: 80 MMHG | WEIGHT: 233.8 LBS | TEMPERATURE: 97.3 F

## 2022-02-10 DIAGNOSIS — E78.49 FAMILIAL HYPERLIPIDEMIA: ICD-10-CM

## 2022-02-10 DIAGNOSIS — E11.9 TYPE 2 DIABETES MELLITUS WITHOUT COMPLICATION, WITHOUT LONG-TERM CURRENT USE OF INSULIN (HCC): Primary | ICD-10-CM

## 2022-02-10 DIAGNOSIS — I10 ESSENTIAL HYPERTENSION: ICD-10-CM

## 2022-02-10 DIAGNOSIS — E11.9 TYPE 2 DIABETES MELLITUS WITHOUT COMPLICATION, WITHOUT LONG-TERM CURRENT USE OF INSULIN (HCC): ICD-10-CM

## 2022-02-10 LAB
ALBUMIN SERPL-MCNC: 4.4 G/DL (ref 3.5–5.2)
ALP BLD-CCNC: 65 U/L (ref 35–104)
ALT SERPL-CCNC: 21 U/L (ref 0–32)
ANION GAP SERPL CALCULATED.3IONS-SCNC: 17 MMOL/L (ref 7–16)
AST SERPL-CCNC: 17 U/L (ref 0–31)
BILIRUB SERPL-MCNC: <0.2 MG/DL (ref 0–1.2)
BUN BLDV-MCNC: 12 MG/DL (ref 6–20)
CALCIUM SERPL-MCNC: 9.9 MG/DL (ref 8.6–10.2)
CHLORIDE BLD-SCNC: 102 MMOL/L (ref 98–107)
CHOLESTEROL, TOTAL: 121 MG/DL (ref 0–199)
CO2: 22 MMOL/L (ref 22–29)
CREAT SERPL-MCNC: 0.8 MG/DL (ref 0.5–1)
GFR AFRICAN AMERICAN: >60
GFR NON-AFRICAN AMERICAN: >60 ML/MIN/1.73
GLUCOSE BLD-MCNC: 105 MG/DL (ref 74–99)
HBA1C MFR BLD: 6.5 % (ref 4–5.6)
HDLC SERPL-MCNC: 35 MG/DL
LDL CHOLESTEROL CALCULATED: 52 MG/DL (ref 0–99)
POTASSIUM SERPL-SCNC: 4.9 MMOL/L (ref 3.5–5)
SODIUM BLD-SCNC: 141 MMOL/L (ref 132–146)
TOTAL PROTEIN: 7.5 G/DL (ref 6.4–8.3)
TRIGL SERPL-MCNC: 170 MG/DL (ref 0–149)
TSH SERPL DL<=0.05 MIU/L-ACNC: 0.51 UIU/ML (ref 0.27–4.2)
VLDLC SERPL CALC-MCNC: 34 MG/DL

## 2022-02-10 PROCEDURE — 99214 OFFICE O/P EST MOD 30 MIN: CPT | Performed by: FAMILY MEDICINE

## 2022-02-10 ASSESSMENT — ENCOUNTER SYMPTOMS
NAUSEA: 0
VOMITING: 0
SORE THROAT: 0
COUGH: 0
SHORTNESS OF BREATH: 0
ABDOMINAL PAIN: 0

## 2022-02-10 NOTE — PROGRESS NOTES
Chief Complaint:     Chief Complaint   Patient presents with    Hypertension    Diabetes    Hyperlipidemia         Hypertension  This is a chronic problem. The current episode started more than 1 month ago. The problem is unchanged. The problem is controlled. Associated symptoms include anxiety. Pertinent negatives include no chest pain, headaches, malaise/fatigue, neck pain, palpitations or shortness of breath. There are no associated agents to hypertension. Risk factors for coronary artery disease include diabetes mellitus, dyslipidemia and obesity. Past treatments include ACE inhibitors. The current treatment provides significant improvement. There are no compliance problems. There is no history of CAD/MI, CVA or PVD. There is no history of a hypertension causing med, pheochromocytoma, renovascular disease, sleep apnea or a thyroid problem. Diabetes  She presents for her follow-up diabetic visit. She has type 2 diabetes mellitus. Her disease course has been stable. Pertinent negatives for hypoglycemia include no dizziness, headaches or nervousness/anxiousness. Associated symptoms include fatigue. Pertinent negatives for diabetes include no chest pain and no weakness. There are no hypoglycemic complications. Symptoms are stable. There are no diabetic complications. Pertinent negatives for diabetic complications include no CVA or PVD. Risk factors for coronary artery disease include diabetes mellitus and obesity. Current diabetic treatment includes oral agent (triple therapy). She is compliant with treatment all of the time. Her weight is stable. She is following a generally healthy diet. When asked about meal planning, she reported none. She rarely participates in exercise. There is no change in her home blood glucose trend. An ACE inhibitor/angiotensin II receptor blocker is being taken. She does not see a podiatrist.Eye exam is current.    Hyperlipidemia  Pertinent negatives include no chest pain, myalgias or shortness of breath. Fatigue  This is a recurrent problem. The current episode started more than 1 month ago. The problem occurs daily. The problem has been unchanged. Associated symptoms include fatigue. Pertinent negatives include no abdominal pain, arthralgias, chest pain, congestion, coughing, diaphoresis, fever, headaches, myalgias, nausea, neck pain, numbness, rash, sore throat, vertigo, vomiting or weakness. Nothing aggravates the symptoms. She has tried nothing for the symptoms. The treatment provided no relief. Patient Active Problem List   Diagnosis    GERD (gastroesophageal reflux disease)    Essential hypertension    Type 2 diabetes mellitus without complication, without long-term current use of insulin (HCC)    Familial hyperlipidemia    Irritable bowel syndrome with diarrhea    Dysfunction of both eustachian tubes    DUB (dysfunctional uterine bleeding)    Fatigue    Current mild episode of major depressive disorder without prior episode ([facility] [address]       Past Medical History:   Diagnosis Date    Diabetes mellitus ([facility] [address]     Fatty liver disease, nonalcoholic     GERD (gastroesophageal reflux disease)     Heart palpitations     Migraine     PCOS (polycystic ovarian syndrome)     Post partum depression        Past Surgical History:   Procedure Laterality Date     SECTION      DILATION AND CURETTAGE OF UTERUS         Current Outpatient Medications   Medication Sig Dispense Refill    buPROPion (WELLBUTRIN XL) 150 MG extended release tablet TAKE 1 TABLET BY MOUTH EVERY DAY IN THE MORNING 90 tablet 1    DEXILANT 60 MG CPDR delayed release capsule TAKE 1 CAPSULE DAILY 90 capsule 3    VICTOZA 18 MG/3ML SOPN SC injection Inject 1.8 mg into the skin daily 9 pen 5    metFORMIN (GLUCOPHAGE) 1000 MG tablet TAKE 1 TABLET TWICE DAILY  WITH MEALS 180 tablet 3    insulin lispro (HUMALOG) 100 UNIT/ML injection vial Via insulin pump.  MAX 75 Units/day 30 mL 6    glipiZIDE (GLUCOTROL XL) 10 MG extended release tablet TAKE 1 TABLET TWICE A  tablet 3    ferrous sulfate (IRON 325) 325 (65 Fe) MG tablet Take 1 tablet by mouth daily (with breakfast) 90 tablet 1    lisinopril (PRINIVIL;ZESTRIL) 20 MG tablet TAKE 1 TABLET DAILY 90 tablet 3    rosuvastatin (CRESTOR) 5 MG tablet TAKE 1 TABLET NIGHTLY 90 tablet 3    venlafaxine (EFFEXOR XR) 150 MG extended release capsule Take 1 capsule by mouth daily 90 capsule 3    Insulin Infusion Pump (MINIMED 770G INSULIN PUMP SYS) KIT To infuse insulin 1 kit 0    Continuous Blood Gluc Transmit ([place] LINK 3 TRANSMITTER) MISC To use with sensors 1 each 0    Continuous Blood Gluc Sensor (GUARDIAN SENSOR 3) MISC To change every 7 days 12 each 3    Insulin Pen Needle (BD PEN NEEDLE WEST U/F) 32G X 4 MM MISC Uses with insulin 4 times a day 250 each 5    albuterol sulfate  (90 Base) MCG/ACT inhaler Inhale 2 puffs into the lungs every 4 hours as needed for Wheezing 1 Inhaler 0    blood glucose test strips (ASCENSIA AUTODISC VI;ONE TOUCH ULTRA TEST VI) strip 1 each by In Vitro route 3 times daily 250 each 5    butalbital-acetaminophen-caffeine (FIORICET, ESGIC) -40 MG per tablet Take 1 tablet by mouth every 6 hours as needed for Headaches or Migraine 90 tablet 3    Insulin Pen Needle (BD ULTRA-FINE PEN NEEDLES) 29G X 12.7MM MISC 1 each by Does not apply route 2 times daily Bd ultrafine needles to use with victoza pen 200 each 3     No current facility-administered medications for this visit.        No Known Allergies    Social History     Socioeconomic History    Marital status:      Spouse name: None    Number of children: None    Years of education: None    Highest education level: None   Occupational History     Employer: [employer]   Tobacco Use    Smoking status: Former Smoker     Packs/day: 2.00     Years: 10.00     Pack years: 20.00     Types: Cigarettes     Quit date: 2016     Years since quittin.2    Smokeless tobacco: Never Used    Tobacco comment: Sometimes smokes cigars   Vaping Use    Vaping Use: Former   Substance and Sexual Activity    Alcohol use: No     Comment: occ.  Drug use: No    Sexual activity: Yes     Partners: Male   Other Topics Concern    None   Social History Narrative    None     Social Determinants of Health     Financial Resource Strain: Low Risk     Difficulty of Paying Living Expenses: Not hard at all   Food Insecurity: No Food Insecurity    Worried About Running Out of Food in the Last Year: Never true    [address] in the Last Year: Never true   Transportation Needs:     Lack of Transportation (Medical): Not on file    Lack of Transportation (Non-Medical): Not on file   Physical Activity:     Days of Exercise per Week: Not on file    Minutes of Exercise per Session: Not on file   Stress:     Feeling of Stress : Not on file   Social Connections:     Frequency of Communication with Friends and Family: Not on file    Frequency of Social Gatherings with Friends and Family: Not on file    Attends Congregation Services: Not on file    Active Member of [employer] or Organizations: Not on file    Attends Club or Organization Meetings: Not on file    Marital Status: Not on file   Intimate Partner Violence:     Fear of Current or Ex-Partner: Not on file    Emotionally Abused: Not on file    Physically Abused: Not on file    Sexually Abused: Not on file   Housing Stability:     Unable to Pay for Housing in the Last Year: Not on file    Number of [place] in the Last Year: Not on file    Unstable Housing in the Last Year: Not on file       Family History   Problem Relation Age of Onset    High Blood Pressure Mother     High Blood Pressure Father     Diabetes Father     Cancer Maternal Grandmother         unknown if it was uterine, ovarian or cervix     Diabetes Paternal Grandfather           Review of Systems   Constitutional: Positive for fatigue.  Negative for diaphoresis, fever and malaise/fatigue. HENT: Negative for congestion and sore throat. Respiratory: Negative for cough and shortness of breath. Cardiovascular: Negative for chest pain and palpitations. Gastrointestinal: Negative for abdominal pain, nausea and vomiting. Musculoskeletal: Negative for arthralgias, myalgias and neck pain. Skin: Negative for rash. Neurological: Negative for dizziness, vertigo, weakness, numbness and headaches. Psychiatric/Behavioral: The patient is not nervous/anxious. /80 (Site: Right Upper Arm, Position: Sitting)   Pulse 94   Temp 97.3 °F (36.3 °C) (Temporal)   Wt 233 lb 12.8 oz (106.1 kg)   SpO2 99%   BMI 36.62 kg/m²     Physical Exam  Vitals and nursing note reviewed. Constitutional:       General: She is not in acute distress. Appearance: Normal appearance. She is well-developed. HENT:      Head: Normocephalic and atraumatic. Right Ear: Hearing, tympanic membrane and external ear normal. No tenderness. No middle ear effusion. Left Ear: Hearing, tympanic membrane and external ear normal. No tenderness. No middle ear effusion. Nose: Nose normal. No congestion or rhinorrhea. Right Turbinates: Not enlarged. Left Turbinates: Not enlarged. Mouth/Throat:      Mouth: Mucous membranes are moist.      Tongue: No lesions. Pharynx: Oropharynx is clear. No oropharyngeal exudate or posterior oropharyngeal erythema. Eyes:      General: No scleral icterus. Conjunctiva/sclera: Conjunctivae normal.      Pupils: Pupils are equal, round, and reactive to light. Neck:      Thyroid: No thyromegaly. Cardiovascular:      Rate and Rhythm: Normal rate and regular rhythm. Heart sounds: Normal heart sounds. No murmur heard. Pulmonary:      Effort: Pulmonary effort is normal. No respiratory distress. Breath sounds: Normal breath sounds. No wheezing or rales.    Abdominal:      General: Bowel sounds are normal. There is no distension. Palpations: Abdomen is soft. Tenderness: There is no abdominal tenderness. Musculoskeletal:         General: No tenderness. Normal range of motion. Cervical back: Normal range of motion and neck supple. No rigidity. No muscular tenderness. Lymphadenopathy:      Cervical: No cervical adenopathy. Skin:     General: Skin is warm and dry. Findings: No erythema or rash. Neurological:      General: No focal deficit present. Mental Status: She is alert and oriented to person, place, and time. Cranial Nerves: No cranial nerve deficit. Deep Tendon Reflexes: Reflexes are normal and symmetric. Reflexes normal.   Psychiatric:         Mood and Affect: Mood normal.                                 ASSESSMENT/PLAN:    Patient Active Problem List   Diagnosis    GERD (gastroesophageal reflux disease)    Essential hypertension    Type 2 diabetes mellitus without complication, without long-term current use of insulin ([facility]    Familial hyperlipidemia    Irritable bowel syndrome with diarrhea    Dysfunction of both eustachian tubes    DUB (dysfunctional uterine bleeding)    Fatigue    Current mild episode of major depressive disorder without prior episode ([facility] [address]       [facility] was seen today for hypertension, diabetes and hyperlipidemia. Diagnoses and all orders for this visit:    Type 2 diabetes mellitus without complication, without long-term current use of insulin ([facility])  -     Comprehensive Metabolic Panel; Future  -     Hemoglobin A1C; Future  -     Lipid Panel; Future  -     TSH without Reflex; Future    Familial hyperlipidemia  -     Comprehensive Metabolic Panel; Future  -     Hemoglobin A1C; Future  -     Lipid Panel; Future  -     TSH without Reflex; Future    Essential hypertension  -     Comprehensive Metabolic Panel; Future  -     Hemoglobin A1C; Future  -     Lipid Panel; Future  -     TSH without Reflex;  Future          Return in about 6 months (around 8/10/2022) for Annual Exam.      I spent 30 minutes with this patient. I spent greater than 50% of the time counseling this patient.         Karissa Carroll,   2/10/2022  8:11 AM

## 2022-03-07 ENCOUNTER — OFFICE VISIT (OUTPATIENT)
Dept: ENDOCRINOLOGY | Age: 47
End: 2022-03-07
Payer: COMMERCIAL

## 2022-03-07 VITALS
SYSTOLIC BLOOD PRESSURE: 128 MMHG | OXYGEN SATURATION: 98 % | RESPIRATION RATE: 18 BRPM | HEIGHT: 67 IN | BODY MASS INDEX: 36.41 KG/M2 | WEIGHT: 232 LBS | HEART RATE: 84 BPM | DIASTOLIC BLOOD PRESSURE: 86 MMHG

## 2022-03-07 DIAGNOSIS — E78.5 HYPERLIPIDEMIA, UNSPECIFIED HYPERLIPIDEMIA TYPE: ICD-10-CM

## 2022-03-07 DIAGNOSIS — E11.9 TYPE 2 DIABETES MELLITUS WITHOUT COMPLICATION, WITHOUT LONG-TERM CURRENT USE OF INSULIN (HCC): Primary | ICD-10-CM

## 2022-03-07 DIAGNOSIS — E66.09 CLASS 2 OBESITY DUE TO EXCESS CALORIES WITHOUT SERIOUS COMORBIDITY WITH BODY MASS INDEX (BMI) OF 36.0 TO 36.9 IN ADULT: ICD-10-CM

## 2022-03-07 DIAGNOSIS — E55.9 VITAMIN D DEFICIENCY: ICD-10-CM

## 2022-03-07 PROCEDURE — 99214 OFFICE O/P EST MOD 30 MIN: CPT | Performed by: CLINICAL NURSE SPECIALIST

## 2022-03-07 PROCEDURE — 95251 CONT GLUC MNTR ANALYSIS I&R: CPT | Performed by: CLINICAL NURSE SPECIALIST

## 2022-03-07 RX ORDER — BLOOD SUGAR DIAGNOSTIC
1 STRIP MISCELLANEOUS 4 TIMES DAILY
Qty: 400 EACH | Refills: 3 | Status: SHIPPED
Start: 2022-03-07 | End: 2022-07-06

## 2022-03-07 NOTE — PROGRESS NOTES
[address] [place] [address] Department of Endocrinology Diabetes and Metabolism   [address] [facility] [address]   Phone: [phone]  Fax: [phone]    Date of Service: 3/7/2022    Primary Care Physician: Bree Jhaveri DO  Referring physician: No ref. provider found  Provider: TONI Paredes - CNS     Reason for the visit:  Type 2 DM     History of Present Illness: The history is provided by the patient. No  was used. Accuracy of the patient data is excellent. Khari Thompson is a very pleasant 42 y.o. female seen today for diabetes management     Khari Thompson was diagnosed with diabetes early 35s.  Started as gestational diabetes and she is currently on 770g Medtronic insulin pump with CGM   On Metformin 1000 mg BID   Glipizide 10 mg BID   Victoza 1.8 mg daily   Current pump settings: Basal rate 0.7, CR 12a 11, 10a 12, 1p 12, ISF 56, goal 100-150, active insulin time 3 hrs   Lab Results   Component Value Date    LABA1C 6.5 02/10/2022    LABA1C 6.9 10/29/2021    LABA1C 7.5 2021     Patient has had no hypoglycemic episodes   Very good with following diabetes diet and encouraged   I reviewed current medications and the patient has no issues with diabetes medications  Khari Thompson is up to date with eye exam and denied any history of diabetic retinopathy, retinopathy   The patient performs her own feet care and doesn't see podiatry   Microvascular complications:  No Retinopathy, Nephropathy or Neuropathy   Macrovascular complications: no CAD, PVD, or Stroke  On statin      PAST MEDICAL HISTORY   Past Medical History:   Diagnosis Date    Diabetes mellitus ([address]     Fatty liver disease, nonalcoholic     GERD (gastroesophageal reflux disease)     Heart palpitations     Migraine     PCOS (polycystic ovarian syndrome)     Post partum depression        PAST SURGICAL HISTORY   Past Surgical History:   Procedure Laterality Date     SECTION      DILATION AND CURETTAGE OF UTERUS         SOCIAL HISTORY   Tobacco:   reports that she quit smoking about 5 years ago. Her smoking use included cigarettes. She has a 20.00 pack-year smoking history. She has never used smokeless tobacco.  Alcohol:   reports no history of alcohol use. Drugs:   reports no history of drug use. FAMILY HISTORY   Family History   Problem Relation Age of Onset    High Blood Pressure Mother     High Blood Pressure Father     Diabetes Father     Cancer Maternal Grandmother         unknown if it was uterine, ovarian or cervix     Diabetes Paternal Grandfather        ALLERGIES AND DRUG REACTIONS   No Known Allergies    CURRENT MEDICATIONS   Current Outpatient Medications   Medication Sig Dispense Refill    blood glucose test strips (ACCU-CHEK GUIDE) strip 1 each by In Vitro route 4 times daily As needed. 400 each 3    buPROPion (WELLBUTRIN XL) 150 MG extended release tablet TAKE 1 TABLET BY MOUTH EVERY DAY IN THE MORNING 90 tablet 1    DEXILANT 60 MG CPDR delayed release capsule TAKE 1 CAPSULE DAILY 90 capsule 3    VICTOZA 18 MG/3ML SOPN SC injection Inject 1.8 mg into the skin daily 9 pen 5    metFORMIN (GLUCOPHAGE) 1000 MG tablet TAKE 1 TABLET TWICE DAILY  WITH MEALS 180 tablet 3    insulin lispro (HUMALOG) 100 UNIT/ML injection vial Via insulin pump.  MAX 75 Units/day 30 mL 6    ferrous sulfate (IRON 325) 325 (65 Fe) MG tablet Take 1 tablet by mouth daily (with breakfast) 90 tablet 1    lisinopril (PRINIVIL;ZESTRIL) 20 MG tablet TAKE 1 TABLET DAILY 90 tablet 3    rosuvastatin (CRESTOR) 5 MG tablet TAKE 1 TABLET NIGHTLY 90 tablet 3    venlafaxine (EFFEXOR XR) 150 MG extended release capsule Take 1 capsule by mouth daily 90 capsule 3    Insulin Infusion Pump (MINIMED 770G INSULIN PUMP SYS) KIT To infuse insulin 1 kit 0    Continuous Blood Gluc Transmit ([employer] LINK 3 TRANSMITTER) MISC To use with sensors 1 each 0    Continuous Blood Gluc Sensor (GUARDIAN SENSOR 3) MISC To change every 7 days 12 each 3    Insulin Pen Needle (BD PEN NEEDLE WEST U/F) 32G X 4 MM MISC Uses with insulin 4 times a day 250 each 5    albuterol sulfate  (90 Base) MCG/ACT inhaler Inhale 2 puffs into the lungs every 4 hours as needed for Wheezing 1 Inhaler 0    blood glucose test strips (ASCENSIA AUTODISC VI;ONE TOUCH ULTRA TEST VI) strip 1 each by In Vitro route 3 times daily 250 each 5    butalbital-acetaminophen-caffeine (FIORICET, ESGIC) -40 MG per tablet Take 1 tablet by mouth every 6 hours as needed for Headaches or Migraine 90 tablet 3    Insulin Pen Needle (BD ULTRA-FINE PEN NEEDLES) 29G X 12.7MM MISC 1 each by Does not apply route 2 times daily Bd ultrafine needles to use with victoza pen 200 each 3     No current facility-administered medications for this visit. Review of Systems  Constitutional: No fever, no chills, no diaphoresis, no generalized weakness. HEENT: No blurred vision, No sore throat, no ear pain, no hair loss  Neck: denied any neck swelling, difficulty swallowing,   Cardio-pulmonary: No CP, SOB or palpitation, No orthopnea or PND. No cough or wheezing. GI: No N/V/D, no constipation, No abdominal pain, no melena or hematochezia   : Denied any dysuria, hematuria, flank pain, discharge, or incontinence. Skin: denied any rash, ulcer, Hirsute, or hyperpigmentation. MSK: denied any joint deformity, joint pain/swelling, muscle pain, or back pain.   Neuro: no numbness, no tingling, no weakness, _    OBJECTIVE    /86   Pulse 84   Resp 18   Ht 5' 7\" (1.702 m)   Wt 232 lb (105.2 kg)   SpO2 98%   BMI 36.34 kg/m²   BP Readings from Last 4 Encounters:   03/07/22 128/86   02/10/22 128/80   11/11/21 124/76   11/04/21 137/87     Wt Readings from Last 6 Encounters:   03/07/22 232 lb (105.2 kg)   02/10/22 233 lb 12.8 oz (106.1 kg)   11/11/21 232 lb (105.2 kg)   11/04/21 232 lb (105.2 kg)   09/30/21 226 lb (102.5 kg)   09/13/21 233 lb (105.7 kg)       Physical examination:  General: awake alert, oriented x3, no abnormal position or movements. HEENT: normocephalic non-traumatic, no exophthalmos   Neck: supple, no LN enlargement, no thyromegaly, no thyroid tenderness, no JVD. Pulm: Clear equal air entry no added sounds, no wheezing or rhonchi    CVS: S1 + S2, no murmur, no heave. Dorsalis pedis pulse palpable   Abd: soft lax, no tenderness, no organomegaly, audible bowel sounds. Skin: warm, no lesions, no rash.  No callus, no Ulcers, No acanthosis nigricans  Musculoskeletal: No back tenderness, no kyphosis/scoliosis    Neuro: CN intact, Monofilament sensation decreased bilateral , muscle power normal  Psych: normal mood, and affect      Review of Laboratory Data:  I personally reviewed the following lab:  Lab Results   Component Value Date/Time    WBC 9.4 08/13/2021 09:59 AM    RBC 4.80 08/13/2021 09:59 AM    HGB 10.2 (L) 08/13/2021 09:59 AM    HCT 35.3 08/13/2021 09:59 AM    MCV 73.5 (L) 08/13/2021 09:59 AM    MCH 21.3 (L) 08/13/2021 09:59 AM    MCHC 28.9 (L) 08/13/2021 09:59 AM    RDW 19.8 (H) 08/13/2021 09:59 AM     08/13/2021 09:59 AM    MPV 12.6 (H) 08/13/2021 09:59 AM      Lab Results   Component Value Date/Time     02/10/2022 08:17 AM    K 4.9 02/10/2022 08:17 AM    CO2 22 02/10/2022 08:17 AM    BUN 12 02/10/2022 08:17 AM    CREATININE 0.8 02/10/2022 08:17 AM    CALCIUM 9.9 02/10/2022 08:17 AM    LABGLOM >60 02/10/2022 08:17 AM    GFRAA >60 02/10/2022 08:17 AM      Lab Results   Component Value Date/Time    TSH 0.507 02/10/2022 08:17 AM    T4FREE 1.34 10/29/2020 08:59 AM     Lab Results   Component Value Date    LABA1C 6.5 02/10/2022    GLUCOSE 105 02/10/2022    GLUCOSE 124 02/01/2012    MALBCR 28.8 10/29/2021    LABMICR 15.0 10/29/2021    LABCREA 52 10/29/2021     Lab Results   Component Value Date    LABA1C 6.5 02/10/2022    LABA1C 6.9 10/29/2021    LABA1C 7.5 05/20/2021     Lab Results   Component Value Date    TRIG 170 02/10/2022    HDL [facility] providers, and personally interpreted labs associated with the above diagnosis. I also ordered labs to further assess and manage the above addressed medical conditions. Return in about 3 months (around 6/7/2022). The above issues were reviewed with the patient who understood and agreed with the plan. Thank you for allowing us to participate in the care of this patient. Please do not hesitate to contact us with any additional questions. TONI Nunez     [facility] Diabetes Care and Endocrinology   [address] [place] [id number]   Phone: [phone]  Fax: [phone]  --------------------------------------------  An electronic signature was used to authenticate this note.  Elvira LEAHY on 3/7/2022 at 8:54 AM

## 2022-04-19 ENCOUNTER — TELEPHONE (OUTPATIENT)
Dept: ADMINISTRATIVE | Age: 47
End: 2022-04-19

## 2022-04-19 NOTE — TELEPHONE ENCOUNTER
Stefanie/[employer] calling to verify status of the Certificate of Medical Necessity that they faxed over on 4/11? Please call her back at [phone] option #3.

## 2022-04-29 DIAGNOSIS — E11.9 TYPE 2 DIABETES MELLITUS WITHOUT COMPLICATION, WITHOUT LONG-TERM CURRENT USE OF INSULIN (HCC): ICD-10-CM

## 2022-05-02 RX ORDER — INSULIN LISPRO 100 [IU]/ML
INJECTION, SOLUTION INTRAVENOUS; SUBCUTANEOUS
Qty: 70 ML | Refills: 5 | Status: SHIPPED
Start: 2022-05-02 | End: 2022-06-27 | Stop reason: SDUPTHER

## 2022-05-20 RX ORDER — ROSUVASTATIN CALCIUM 5 MG/1
TABLET, COATED ORAL
Qty: 90 TABLET | Refills: 3 | Status: SHIPPED | OUTPATIENT
Start: 2022-05-20

## 2022-05-20 RX ORDER — LISINOPRIL 20 MG/1
TABLET ORAL
Qty: 90 TABLET | Refills: 3 | Status: SHIPPED | OUTPATIENT
Start: 2022-05-20

## 2022-05-20 RX ORDER — VENLAFAXINE HYDROCHLORIDE 150 MG/1
150 CAPSULE, EXTENDED RELEASE ORAL DAILY
Qty: 90 CAPSULE | Refills: 3 | Status: SHIPPED | OUTPATIENT
Start: 2022-05-20

## 2022-05-26 DIAGNOSIS — E11.9 TYPE 2 DIABETES MELLITUS WITHOUT COMPLICATION, WITHOUT LONG-TERM CURRENT USE OF INSULIN (HCC): ICD-10-CM

## 2022-06-27 ENCOUNTER — OFFICE VISIT (OUTPATIENT)
Dept: ENDOCRINOLOGY | Age: 47
End: 2022-06-27
Payer: COMMERCIAL

## 2022-06-27 VITALS
SYSTOLIC BLOOD PRESSURE: 127 MMHG | DIASTOLIC BLOOD PRESSURE: 85 MMHG | WEIGHT: 230 LBS | HEART RATE: 92 BPM | OXYGEN SATURATION: 99 % | BODY MASS INDEX: 36.1 KG/M2 | HEIGHT: 67 IN

## 2022-06-27 DIAGNOSIS — E66.09 CLASS 2 OBESITY DUE TO EXCESS CALORIES WITHOUT SERIOUS COMORBIDITY WITH BODY MASS INDEX (BMI) OF 36.0 TO 36.9 IN ADULT: ICD-10-CM

## 2022-06-27 DIAGNOSIS — E78.5 HYPERLIPIDEMIA, UNSPECIFIED HYPERLIPIDEMIA TYPE: ICD-10-CM

## 2022-06-27 DIAGNOSIS — E11.9 TYPE 2 DIABETES MELLITUS WITHOUT COMPLICATION, WITHOUT LONG-TERM CURRENT USE OF INSULIN (HCC): Primary | ICD-10-CM

## 2022-06-27 DIAGNOSIS — E55.9 VITAMIN D DEFICIENCY: ICD-10-CM

## 2022-06-27 LAB — HBA1C MFR BLD: 7.2 %

## 2022-06-27 PROCEDURE — 95251 CONT GLUC MNTR ANALYSIS I&R: CPT | Performed by: CLINICAL NURSE SPECIALIST

## 2022-06-27 PROCEDURE — 83036 HEMOGLOBIN GLYCOSYLATED A1C: CPT | Performed by: CLINICAL NURSE SPECIALIST

## 2022-06-27 PROCEDURE — 3051F HG A1C>EQUAL 7.0%<8.0%: CPT | Performed by: CLINICAL NURSE SPECIALIST

## 2022-06-27 PROCEDURE — 99214 OFFICE O/P EST MOD 30 MIN: CPT | Performed by: CLINICAL NURSE SPECIALIST

## 2022-06-27 RX ORDER — INSULIN LISPRO 100 [IU]/ML
INJECTION, SOLUTION INTRAVENOUS; SUBCUTANEOUS
Qty: 70 ML | Refills: 5 | Status: SHIPPED | OUTPATIENT
Start: 2022-06-27

## 2022-06-27 RX ORDER — PEN NEEDLE, DIABETIC 32GX 5/32"
NEEDLE, DISPOSABLE MISCELLANEOUS
Qty: 100 EACH | Refills: 3 | Status: SHIPPED | OUTPATIENT
Start: 2022-06-27

## 2022-06-27 RX ORDER — LIRAGLUTIDE 6 MG/ML
1.8 INJECTION SUBCUTANEOUS DAILY
Qty: 9 PEN | Refills: 5 | Status: SHIPPED | OUTPATIENT
Start: 2022-06-27

## 2022-06-27 NOTE — PROGRESS NOTES
[address]  [facility] [place] Department of Endocrinology Diabetes and Metabolism   [address] [facility] [address]   Phone: [phone]  Fax: [phone]    Date of Service: 2022    Primary Care Physician: Dax Romero DO  Referring physician: No ref. provider found  Provider: TONI Nicole     Reason for the visit:  Type 2 DM     History of Present Illness: The history is provided by the patient. No  was used. Accuracy of the patient data is excellent. Zaynab Chawla is a very pleasant 41 y.o. female seen today for diabetes management     Zaynab Chawla was diagnosed with diabetes early 35s.  Started as gestational diabetes and she is currently on 5g Medtronic insulin pump with CGM   On Metformin 1000 mg BID   Victoza 1.8 mg daily   Current pump settings: Basal rate 0.7, CR 12a 11, 10a 12, 1p 12, ISF 56, goal 100-150, active insulin time 3 hrs   Lab Results   Component Value Date    LABA1C 7.2 2022    LABA1C 6.5 02/10/2022    LABA1C 6.9 10/29/2021     Patient has had no hypoglycemic episodes   Very good with following diabetes diet and encouraged   I reviewed current medications and the patient has no issues with diabetes medications  Zaynab Chawla is up to date with eye exam and denied any history of diabetic retinopathy, retinopathy   The patient performs her own feet care and doesn't see podiatry   Microvascular complications:  No Retinopathy, Nephropathy or Neuropathy   Macrovascular complications: no CAD, PVD, or Stroke  On statin      PAST MEDICAL HISTORY   Past Medical History:   Diagnosis Date    Diabetes mellitus ([address]     Fatty liver disease, nonalcoholic     GERD (gastroesophageal reflux disease)     Heart palpitations     Migraine     PCOS (polycystic ovarian syndrome)     Post partum depression        PAST SURGICAL HISTORY   Past Surgical History:   Procedure Laterality Date     SECTION      DILATION AND CURETTAGE OF UTERUS         SOCIAL HISTORY   Tobacco:   reports that she quit smoking about 5 years ago. Her smoking use included cigarettes. She has a 20.00 pack-year smoking history. She has never used smokeless tobacco.  Alcohol:   reports no history of alcohol use. Drugs:   reports no history of drug use. FAMILY HISTORY   Family History   Problem Relation Age of Onset    High Blood Pressure Mother     High Blood Pressure Father     Diabetes Father     Cancer Maternal Grandmother         unknown if it was uterine, ovarian or cervix     Diabetes Paternal Grandfather        ALLERGIES AND DRUG REACTIONS   No Known Allergies    CURRENT MEDICATIONS   Current Outpatient Medications   Medication Sig Dispense Refill    metFORMIN (GLUCOPHAGE) 1000 MG tablet TAKE 1 TABLET BY MOUTH  TWICE DAILY WITH MEALS 180 tablet 3    insulin lispro (HUMALOG) 100 UNIT/ML SOLN injection vial INJECT SUBCUTANEOUSLY VIA  INSULIN PUMP MAX 75 UNITS  PER DAY 70 mL 5    VICTOZA 18 MG/3ML SOPN SC injection Inject 1.8 mg into the skin daily 9 pen 5    Insulin Pen Needle (BD PEN NEEDLE WEST U/F) 32G X 4 MM MISC Uses once per day 100 each 3    blood glucose test strips (ASCENSIA AUTODISC VI;ONE TOUCH ULTRA TEST VI) strip 1 each by In Vitro route 4 times daily 400 each 3    Insulin Infusion Pump (MINIMED 770G INSULIN PUMP SYS) KIT To infuse insulin 1 kit 0    venlafaxine (EFFEXOR XR) 150 MG extended release capsule TAKE 1 CAPSULE BY MOUTH  DAILY 90 capsule 3    lisinopril (PRINIVIL;ZESTRIL) 20 MG tablet TAKE 1 TABLET BY MOUTH  DAILY 90 tablet 3    rosuvastatin (CRESTOR) 5 MG tablet TAKE 1 TABLET BY MOUTH AT  NIGHT 90 tablet 3    blood glucose test strips (ACCU-CHEK GUIDE) strip 1 each by In Vitro route 4 times daily As needed.  400 each 3    buPROPion (WELLBUTRIN XL) 150 MG extended release tablet TAKE 1 TABLET BY MOUTH EVERY DAY IN THE MORNING 90 tablet 1    DEXILANT 60 MG CPDR delayed release capsule TAKE 1 CAPSULE DAILY 90 capsule 3    ferrous sulfate (IRON 325) 325 (65 Fe) MG tablet Take 1 tablet by mouth daily (with breakfast) 90 tablet 1    Continuous Blood Gluc Transmit (GUARDIAN LINK 3 TRANSMITTER) MISC To use with sensors 1 each 0    Continuous Blood Gluc Sensor (GUARDIAN SENSOR 3) MISC To change every 7 days 12 each 3    albuterol sulfate  (90 Base) MCG/ACT inhaler Inhale 2 puffs into the lungs every 4 hours as needed for Wheezing 1 Inhaler 0    butalbital-acetaminophen-caffeine (FIORICET, ESGIC) -40 MG per tablet Take 1 tablet by mouth every 6 hours as needed for Headaches or Migraine 90 tablet 3    Insulin Pen Needle (BD ULTRA-FINE PEN NEEDLES) 29G X 12.7MM MISC 1 each by Does not apply route 2 times daily Bd ultrafine needles to use with victoza pen 200 each 3     No current facility-administered medications for this visit. Review of Systems  Constitutional: No fever, no chills, no diaphoresis, no generalized weakness. HEENT: No blurred vision, No sore throat, no ear pain, no hair loss  Neck: denied any neck swelling, difficulty swallowing,   Cardio-pulmonary: No CP, SOB or palpitation, No orthopnea or PND. No cough or wheezing. GI: No N/V/D, no constipation, No abdominal pain, no melena or hematochezia   : Denied any dysuria, hematuria, flank pain, discharge, or incontinence. Skin: denied any rash, ulcer, Hirsute, or hyperpigmentation. MSK: denied any joint deformity, joint pain/swelling, muscle pain, or back pain.   Neuro: no numbness, no tingling, no weakness, _    OBJECTIVE    /85   Pulse 92   Ht 5' 7\" (1.702 m)   Wt 230 lb (104.3 kg)   SpO2 99%   BMI 36.02 kg/m²   BP Readings from Last 4 Encounters:   06/27/22 127/85   03/07/22 128/86   02/10/22 128/80   11/11/21 124/76     Wt Readings from Last 6 Encounters:   06/27/22 230 lb (104.3 kg)   03/07/22 232 lb (105.2 kg)   02/10/22 233 lb 12.8 oz (106.1 kg)   11/11/21 232 lb (105.2 kg)   11/04/21 232 lb (105.2 kg)   09/30/21 226 lb (102.5 kg) Physical examination:  General: awake alert, oriented x3, no abnormal position or movements. HEENT: normocephalic non-traumatic, no exophthalmos   Neck: supple, no LN enlargement, no thyromegaly, no thyroid tenderness, no JVD. Pulm: Clear equal air entry no added sounds, no wheezing or rhonchi    CVS: S1 + S2, no murmur, no heave. Dorsalis pedis pulse palpable   Abd: soft lax, no tenderness, no organomegaly, audible bowel sounds. Skin: warm, no lesions, no rash.  No callus, no Ulcers, No acanthosis nigricans  Musculoskeletal: No back tenderness, no kyphosis/scoliosis    Neuro: CN intact, Monofilament sensation decreased bilateral , muscle power normal  Psych: normal mood, and affect      Review of Laboratory Data:  I personally reviewed the following lab:  Lab Results   Component Value Date/Time    WBC 9.4 08/13/2021 09:59 AM    RBC 4.80 08/13/2021 09:59 AM    HGB 10.2 (L) 08/13/2021 09:59 AM    HCT 35.3 08/13/2021 09:59 AM    MCV 73.5 (L) 08/13/2021 09:59 AM    MCH 21.3 (L) 08/13/2021 09:59 AM    MCHC 28.9 (L) 08/13/2021 09:59 AM    RDW 19.8 (H) 08/13/2021 09:59 AM     08/13/2021 09:59 AM    MPV 12.6 (H) 08/13/2021 09:59 AM      Lab Results   Component Value Date/Time     02/10/2022 08:17 AM    K 4.9 02/10/2022 08:17 AM    CO2 22 02/10/2022 08:17 AM    BUN 12 02/10/2022 08:17 AM    CREATININE 0.8 02/10/2022 08:17 AM    CALCIUM 9.9 02/10/2022 08:17 AM    LABGLOM >60 02/10/2022 08:17 AM    GFRAA >60 02/10/2022 08:17 AM      Lab Results   Component Value Date/Time    TSH 0.507 02/10/2022 08:17 AM    T4FREE 1.34 10/29/2020 08:59 AM     Lab Results   Component Value Date    LABA1C 7.2 06/27/2022    GLUCOSE 105 02/10/2022    GLUCOSE 124 02/01/2012    MALBCR 28.8 10/29/2021    LABMICR 15.0 10/29/2021    LABCREA 52 10/29/2021     Lab Results   Component Value Date    LABA1C 7.2 06/27/2022    LABA1C 6.5 02/10/2022    LABA1C 6.9 10/29/2021     Lab Results   Component Value Date    TRIG 170 02/10/2022 HDL 35 02/10/2022    LDLCALC 52 02/10/2022    CHOL 121 02/10/2022     Lab Results   Component Value Date    VITD25 79 10/29/2021    VITD25 94 07/02/2020       ASSESSMENT & RECOMMENDATIONS   Amy Nguyễn, a 41 y.o.-old female seen in for the following issues       Assessment:      Diagnosis Orders   1. Type 2 diabetes mellitus without complication, without long-term current use of insulin ([facility])  POCT glycosylated hemoglobin (Hb A1C)    metFORMIN (GLUCOPHAGE) 1000 MG tablet    insulin lispro (HUMALOG) 100 UNIT/ML SOLN injection vial    VICTOZA 18 MG/3ML SOPN SC injection    Insulin Pen Needle (BD PEN NEEDLE WEST U/F) 32G X 4 MM MISC    blood glucose test strips (ASCENSIA AUTODISC VI;ONE TOUCH ULTRA TEST VI) strip    VA CONTINUOUS GLUCOSE MONITORING ANALYSIS I&R   2. Vitamin D deficiency     3. Hyperlipidemia, unspecified hyperlipidemia type     4. Class 2 obesity due to excess calories without serious comorbidity with body mass index (BMI) of 36.0 to 36.9 in adult         Plan:     1. Type 2 diabetes mellitus without complication, without long-term current use of insulin ([facility] [address]   · Patient's diabetes is well controlled. Hemoglobin A1c 7.2%  · Insulin pump and CGM reviewed  · Plan: Continue insulin pump settings as above  · Continue Victoza 1.8 mg daily  · Continue metformin 1000 mg twice daily  · The patient was advised to check blood sugars 4 times per day and as needed  · Insulin pump and CGM reviewed  · Discussed with patient A1c and blood sugar goals   · Discussed lifestyle changes including diet and exercise with patient; recommended 150 minutes of moderate intensity exercise per week. 2. Vitamin D deficiency   · Continue Vitamin D supplementation  · Counseled on the importance of vitamin D and bone health   3. Hyperlipidemia, unspecified hyperlipidemia type   · Continue rosuvastatin. ·  Lipids reviewed. Last LDL at goal.    · Triglycerides mildly elevated. ·  Counseled patient on diet and exercise. 4. Class 2 obesity due to excess calories without serious comorbidity with body mass index (BMI) of 36.0 to 36.9 in adult   · Discussed lifestyle changes including diet and exercise with patient in depth. Also discussed with patient cardiovascular risk associated with obesity       I personally spent > 30 minutes  Reviewing  external notes from PCP and other patient's care team providers, and personally interpreted labs associated with the above diagnosis. I also ordered labs to further assess and manage the above addressed medical conditions. Return in about 3 months (around 9/27/2022). The above issues were reviewed with the patient who understood and agreed with the plan. Thank you for allowing us to participate in the care of this patient. Please do not hesitate to contact us with any additional questions. TONI Perdue     [facility] Diabetes Care and Endocrinology   [address] [place] [address]   Phone: [phone]  Fax: [phone]  --------------------------------------------  An electronic signature was used to authenticate this note.  TONI Perdue   on 6/27/2022 at 8:20 AM

## 2022-06-30 ENCOUNTER — PATIENT MESSAGE (OUTPATIENT)
Dept: ENDOCRINOLOGY | Age: 47
End: 2022-06-30

## 2022-07-06 ENCOUNTER — PATIENT MESSAGE (OUTPATIENT)
Dept: PRIMARY CARE CLINIC | Age: 47
End: 2022-07-06

## 2022-07-06 DIAGNOSIS — E11.9 TYPE 2 DIABETES MELLITUS WITHOUT COMPLICATION, WITH LONG-TERM CURRENT USE OF INSULIN (HCC): Primary | ICD-10-CM

## 2022-07-06 DIAGNOSIS — Z79.4 TYPE 2 DIABETES MELLITUS WITHOUT COMPLICATION, WITH LONG-TERM CURRENT USE OF INSULIN (HCC): Primary | ICD-10-CM

## 2022-07-06 RX ORDER — BLOOD-GLUCOSE METER
KIT MISCELLANEOUS
Qty: 1 KIT | Refills: 0 | Status: SHIPPED | OUTPATIENT
Start: 2022-07-06

## 2022-07-06 RX ORDER — CARVEDILOL 25 MG/1
1 TABLET, FILM COATED ORAL 3 TIMES DAILY
Qty: 300 EACH | Refills: 3 | Status: SHIPPED | OUTPATIENT
Start: 2022-07-06

## 2022-07-06 NOTE — TELEPHONE ENCOUNTER
From: Timbo Michael  To: Dr. Laura Piper: 7/6/2022 10:22 AM EDT  Subject: Glucometer    Good morning! Could you please send a script for a new meter and test strips to [facility]. My one touch is not covered and the are asking for a script for the Contour meter that is.  Thanks in advance :)

## 2022-07-07 DIAGNOSIS — E11.9 TYPE 2 DIABETES MELLITUS WITHOUT COMPLICATION, WITH LONG-TERM CURRENT USE OF INSULIN (HCC): ICD-10-CM

## 2022-07-07 DIAGNOSIS — Z79.4 TYPE 2 DIABETES MELLITUS WITHOUT COMPLICATION, WITH LONG-TERM CURRENT USE OF INSULIN (HCC): ICD-10-CM

## 2022-07-18 ENCOUNTER — OFFICE VISIT (OUTPATIENT)
Dept: PRIMARY CARE CLINIC | Age: 47
End: 2022-07-18
Payer: COMMERCIAL

## 2022-07-18 VITALS
BODY MASS INDEX: 36.88 KG/M2 | HEART RATE: 101 BPM | DIASTOLIC BLOOD PRESSURE: 70 MMHG | RESPIRATION RATE: 16 BRPM | HEIGHT: 67 IN | SYSTOLIC BLOOD PRESSURE: 128 MMHG | OXYGEN SATURATION: 98 % | TEMPERATURE: 97.3 F | WEIGHT: 235 LBS

## 2022-07-18 DIAGNOSIS — G43.009 MIGRAINE WITHOUT AURA AND WITHOUT STATUS MIGRAINOSUS, NOT INTRACTABLE: Primary | ICD-10-CM

## 2022-07-18 DIAGNOSIS — F32.0 CURRENT MILD EPISODE OF MAJOR DEPRESSIVE DISORDER WITHOUT PRIOR EPISODE (HCC): ICD-10-CM

## 2022-07-18 DIAGNOSIS — R53.83 FATIGUE, UNSPECIFIED TYPE: ICD-10-CM

## 2022-07-18 PROCEDURE — 99214 OFFICE O/P EST MOD 30 MIN: CPT | Performed by: FAMILY MEDICINE

## 2022-07-18 RX ORDER — DOXYCYCLINE HYCLATE 100 MG/1
CAPSULE ORAL
COMMUNITY
Start: 2022-07-11

## 2022-07-18 ASSESSMENT — ENCOUNTER SYMPTOMS
APNEA: 0
DIARRHEA: 0
BACK PAIN: 0
SHORTNESS OF BREATH: 0
VOMITING: 0
CHEST TIGHTNESS: 0
BLOOD IN STOOL: 0
NAUSEA: 1
SORE THROAT: 0
COLOR CHANGE: 0
SINUS PRESSURE: 0
COUGH: 0
RHINORRHEA: 0
WHEEZING: 0
EYE REDNESS: 0
CONSTIPATION: 0
EYE WATERING: 0
EYE PAIN: 0
PHOTOPHOBIA: 1
ABDOMINAL PAIN: 0
EYE ITCHING: 0

## 2022-07-18 ASSESSMENT — PATIENT HEALTH QUESTIONNAIRE - PHQ9
SUM OF ALL RESPONSES TO PHQ QUESTIONS 1-9: 0
SUM OF ALL RESPONSES TO PHQ QUESTIONS 1-9: 0
SUM OF ALL RESPONSES TO PHQ9 QUESTIONS 1 & 2: 0
6. FEELING BAD ABOUT YOURSELF - OR THAT YOU ARE A FAILURE OR HAVE LET YOURSELF OR YOUR FAMILY DOWN: 0
10. IF YOU CHECKED OFF ANY PROBLEMS, HOW DIFFICULT HAVE THESE PROBLEMS MADE IT FOR YOU TO DO YOUR WORK, TAKE CARE OF THINGS AT HOME, OR GET ALONG WITH OTHER PEOPLE: 0
7. TROUBLE CONCENTRATING ON THINGS, SUCH AS READING THE NEWSPAPER OR WATCHING TELEVISION: 0
1. LITTLE INTEREST OR PLEASURE IN DOING THINGS: 0
SUM OF ALL RESPONSES TO PHQ QUESTIONS 1-9: 0
8. MOVING OR SPEAKING SO SLOWLY THAT OTHER PEOPLE COULD HAVE NOTICED. OR THE OPPOSITE, BEING SO FIGETY OR RESTLESS THAT YOU HAVE BEEN MOVING AROUND A LOT MORE THAN USUAL: 0
4. FEELING TIRED OR HAVING LITTLE ENERGY: 0
3. TROUBLE FALLING OR STAYING ASLEEP: 0
5. POOR APPETITE OR OVEREATING: 0
2. FEELING DOWN, DEPRESSED OR HOPELESS: 0
9. THOUGHTS THAT YOU WOULD BE BETTER OFF DEAD, OR OF HURTING YOURSELF: 0
SUM OF ALL RESPONSES TO PHQ QUESTIONS 1-9: 0

## 2022-07-18 NOTE — PROGRESS NOTES
Chief Complaint:     Chief Complaint   Patient presents with    Migraine         Migraine   This is a chronic problem. The current episode started more than 1 month ago. The problem occurs intermittently. The problem has been gradually worsening. The pain is located in the Frontal region. The pain does not radiate. The pain quality is similar to prior headaches. The quality of the pain is described as aching, dull and throbbing. The pain is moderate. Associated symptoms include nausea, phonophobia, photophobia, tingling and weakness. Pertinent negatives include no abdominal pain, back pain, coughing, dizziness, drainage, ear pain, eye pain, eye redness, eye watering, fever, hearing loss, insomnia, neck pain, numbness, rhinorrhea, sinus pressure, sore throat or vomiting. The symptoms are aggravated by unknown. She has tried Excedrin and triptans for the symptoms. The treatment provided mild relief. Her past medical history is significant for migraine headaches. Mental Health Problem  The primary symptoms do not include dysphoric mood. The current episode started more than 1 month ago. This is a new problem. The onset of the illness is precipitated by a stressful event and emotional stress. The degree of incapacity that she is experiencing as a consequence of her illness is mild. Additional symptoms of the illness do not include anhedonia, insomnia, hypersomnia, appetite change, unexpected weight change, fatigue, headaches or abdominal pain. She does not admit to suicidal ideas. She does not have a plan to attempt suicide. She does not contemplate harming herself. She has not already injured self. She does not contemplate injuring another person. Risk factors that are present for mental illness include a history of mental illness and a family history of mental illness. Fatigue  This is a recurrent problem. The current episode started more than 1 month ago. The problem occurs intermittently.  The problem has been waxing and waning. Associated symptoms include nausea and weakness. Pertinent negatives include no abdominal pain, arthralgias, chest pain, congestion, coughing, fatigue, fever, headaches, myalgias, neck pain, numbness, rash, sore throat or vomiting. The symptoms are aggravated by stress. Treatments tried: wellbutrin. The treatment provided moderate relief. Patient Active Problem List   Diagnosis    GERD (gastroesophageal reflux disease)    Essential hypertension    Type 2 diabetes mellitus without complication, without long-term current use of insulin ([facility])    Familial hyperlipidemia    Irritable bowel syndrome with diarrhea    Dysfunction of both eustachian tubes    DUB (dysfunctional uterine bleeding)    Fatigue    Current mild episode of major depressive disorder without prior episode ([facility])    Migraine without aura and without status migrainosus, not intractable       Past Medical History:   Diagnosis Date    Diabetes mellitus ([facility] [address]     Fatty liver disease, nonalcoholic     GERD (gastroesophageal reflux disease)     Heart palpitations     Migraine     PCOS (polycystic ovarian syndrome)     Post partum depression        Past Surgical History:   Procedure Laterality Date     SECTION      DILATION AND CURETTAGE OF UTERUS         Current Outpatient Medications   Medication Sig Dispense Refill    Rimegepant Sulfate 75 MG TBDP Take 75 mg by mouth every other day 45 tablet 1    Blood Glucose Monitoring Suppl (CONTOUR BLOOD GLUCOSE SYSTEM) w/Device KIT Test sugar 3 times daily 1 kit 0    blood glucose test strips (CONTOUR TEST) strip 1 each by In Vitro route 3 times daily As needed.  300 each 3    metFORMIN (GLUCOPHAGE) 1000 MG tablet TAKE 1 TABLET BY MOUTH  TWICE DAILY WITH MEALS 180 tablet 3    insulin lispro (HUMALOG) 100 UNIT/ML SOLN injection vial INJECT SUBCUTANEOUSLY VIA  INSULIN PUMP MAX 75 UNITS  PER DAY 70 mL 5    VICTOZA 18 MG/3ML SOPN SC injection Inject 1.8 mg into the skin daily 9 pen 5 Insulin Pen Needle (BD PEN NEEDLE WEST U/F) 32G X 4 MM MISC Uses once per day 100 each 3    venlafaxine (EFFEXOR XR) 150 MG extended release capsule TAKE 1 CAPSULE BY MOUTH  DAILY 90 capsule 3    lisinopril (PRINIVIL;ZESTRIL) 20 MG tablet TAKE 1 TABLET BY MOUTH  DAILY 90 tablet 3    rosuvastatin (CRESTOR) 5 MG tablet TAKE 1 TABLET BY MOUTH AT  NIGHT 90 tablet 3    buPROPion (WELLBUTRIN XL) 150 MG extended release tablet TAKE 1 TABLET BY MOUTH EVERY DAY IN THE MORNING 90 tablet 1    DEXILANT 60 MG CPDR delayed release capsule TAKE 1 CAPSULE DAILY 90 capsule 3    ferrous sulfate (IRON 325) 325 (65 Fe) MG tablet Take 1 tablet by mouth daily (with breakfast) 90 tablet 1    Insulin Infusion Pump (MINIMED 770G INSULIN PUMP SYS) KIT To infuse insulin 1 kit 0    Continuous Blood Gluc Transmit ([employer] LINK 3 TRANSMITTER) MISC To use with sensors 1 each 0    Continuous Blood Gluc Sensor (GUARDIAN SENSOR 3) MISC To change every 7 days 12 each 3    albuterol sulfate  (90 Base) MCG/ACT inhaler Inhale 2 puffs into the lungs every 4 hours as needed for Wheezing 1 Inhaler 0    butalbital-acetaminophen-caffeine (FIORICET, ESGIC) -40 MG per tablet Take 1 tablet by mouth every 6 hours as needed for Headaches or Migraine 90 tablet 3    Insulin Pen Needle (BD ULTRA-FINE PEN NEEDLES) 29G X 12.7MM MISC 1 each by Does not apply route 2 times daily Bd ultrafine needles to use with victoza pen 200 each 3    doxycycline hyclate (VIBRAMYCIN) 100 MG capsule TAKE 1 CAPSULE BY MOUTH EVERY DAY WITH FOOD       No current facility-administered medications for this visit.        No Known Allergies    Social History     Socioeconomic History    Marital status:      Spouse name: None    Number of children: None    Years of education: None    Highest education level: None   Occupational History     Employer: [employer]   Tobacco Use    Smoking status: Former     Packs/day: 2.00     Years: 10.00     Pack years: 20.00 Types: Cigarettes     Quit date: 2016     Years since quittin.6    Smokeless tobacco: Never    Tobacco comments:     Sometimes smokes cigars   Vaping Use    Vaping Use: Former   Substance and Sexual Activity    Alcohol use: No     Comment: occ. Drug use: No    Sexual activity: Yes     Partners: Male     Social Determinants of Health     Financial Resource Strain: Low Risk     Difficulty of Paying Living Expenses: Not hard at all   Food Insecurity: No Food Insecurity    Worried About Running Out of Food in the Last Year: Never true    Ran Out of Food in the Last Year: Never true       Family History   Problem Relation Age of Onset    High Blood Pressure Mother     High Blood Pressure Father     Diabetes Father     Cancer Maternal Grandmother         unknown if it was uterine, ovarian or cervix     Diabetes Paternal Grandfather           Review of Systems   Constitutional:  Negative for activity change, appetite change, fatigue, fever and unexpected weight change. HENT:  Negative for congestion, ear pain, hearing loss, nosebleeds, rhinorrhea, sinus pressure and sore throat. Eyes:  Positive for photophobia. Negative for pain, redness, itching and visual disturbance. Respiratory:  Negative for apnea, cough, chest tightness, shortness of breath and wheezing. Cardiovascular:  Negative for chest pain, palpitations and leg swelling. Gastrointestinal:  Positive for nausea. Negative for abdominal pain, blood in stool, constipation, diarrhea and vomiting. Endocrine: Negative. Genitourinary:  Negative for decreased urine volume, difficulty urinating, dysuria, frequency, hematuria and urgency. Musculoskeletal:  Negative for arthralgias, back pain, gait problem, myalgias and neck pain. Skin:  Negative for color change and rash. Allergic/Immunologic: Negative for environmental allergies and food allergies. Neurological:  Positive for tingling and weakness.  Negative for dizziness, light-headedness, numbness and headaches. Hematological:  Negative for adenopathy. Does not bruise/bleed easily. Psychiatric/Behavioral:  Negative for behavioral problems, dysphoric mood and sleep disturbance. The patient is not nervous/anxious, does not have insomnia and is not hyperactive. All other systems reviewed and are negative. /70   Pulse (!) 101   Temp 97.3 °F (36.3 °C)   Resp 16   Ht 5' 7\" (1.702 m)   Wt 235 lb (106.6 kg)   SpO2 98%   BMI 36.81 kg/m²     Physical Exam  Vitals and nursing note reviewed. Constitutional:       General: She is not in acute distress. Appearance: Normal appearance. She is well-developed. HENT:      Head: Normocephalic and atraumatic. Right Ear: Hearing, tympanic membrane and external ear normal. No tenderness. No middle ear effusion. Left Ear: Hearing, tympanic membrane and external ear normal. No tenderness. No middle ear effusion. Nose: Nose normal. No congestion or rhinorrhea. Right Turbinates: Not enlarged. Left Turbinates: Not enlarged. Mouth/Throat:      Mouth: Mucous membranes are moist.      Tongue: No lesions. Pharynx: Oropharynx is clear. No oropharyngeal exudate or posterior oropharyngeal erythema. Eyes:      General: No scleral icterus. Conjunctiva/sclera: Conjunctivae normal.      Pupils: Pupils are equal, round, and reactive to light. Neck:      Thyroid: No thyromegaly. Cardiovascular:      Rate and Rhythm: Normal rate and regular rhythm. Heart sounds: Normal heart sounds. No murmur heard. Pulmonary:      Effort: Pulmonary effort is normal. No respiratory distress. Breath sounds: Normal breath sounds. No wheezing or rales. Abdominal:      General: Bowel sounds are normal. There is no distension. Palpations: Abdomen is soft. Tenderness: There is no abdominal tenderness. Musculoskeletal:         General: No tenderness. Normal range of motion.       Cervical back: Normal range of motion and neck supple. No rigidity. No muscular tenderness. Lymphadenopathy:      Cervical: No cervical adenopathy. Skin:     General: Skin is warm and dry. Findings: No erythema or rash. Neurological:      General: No focal deficit present. Mental Status: She is alert and oriented to person, place, and time. Cranial Nerves: No cranial nerve deficit. Deep Tendon Reflexes: Reflexes are normal and symmetric. Reflexes normal.   Psychiatric:         Mood and Affect: Mood normal.                               ASSESSMENT/PLAN:    Patient Active Problem List   Diagnosis    GERD (gastroesophageal reflux disease)    Essential hypertension    Type 2 diabetes mellitus without complication, without long-term current use of insulin ([facility])    Familial hyperlipidemia    Irritable bowel syndrome with diarrhea    Dysfunction of both eustachian tubes    DUB (dysfunctional uterine bleeding)    Fatigue    Current mild episode of major depressive disorder without prior episode ([facility])    Migraine without aura and without status migrainosus, not intractable       Van Hobbs was seen today for migraine. Diagnoses and all orders for this visit:    Fatigue, unspecified type    Migraine without aura and without status migrainosus, not intractable  -     MRI BRAIN WO CONTRAST; Future  -     Zach Daniel MD, Neurology, [place]    Current mild episode of major depressive disorder without prior episode [facility])    Other orders  -     Rimegepant Sulfate 75 MG TBDP; Take 75 mg by mouth every other day    Counseled on meds, side effects, alternatives  Orders placed  All questions answered      Return in about 6 weeks (around 8/29/2022) for Recheck Meds. I spent 30 minutes with this patient. I spent greater than 50% of the time counseling this patient.         Brielle Lim DO  7/18/2022  8:17 AM

## 2022-07-31 ENCOUNTER — HOSPITAL ENCOUNTER (OUTPATIENT)
Dept: MRI IMAGING | Age: 47
Discharge: HOME | End: 2022-08-02
Payer: COMMERCIAL

## 2022-07-31 DIAGNOSIS — G43.009 MIGRAINE WITHOUT AURA AND WITHOUT STATUS MIGRAINOSUS, NOT INTRACTABLE: ICD-10-CM

## 2022-07-31 PROCEDURE — 70551 MRI BRAIN STEM W/O DYE: CPT

## 2022-08-12 RX ORDER — BUPROPION HYDROCHLORIDE 150 MG/1
TABLET ORAL
Qty: 90 TABLET | Refills: 1 | Status: SHIPPED | OUTPATIENT
Start: 2022-08-12

## 2022-08-22 RX ORDER — DEXLANSOPRAZOLE 60 MG/1
CAPSULE, DELAYED RELEASE ORAL
Qty: 90 CAPSULE | Refills: 3 | Status: SHIPPED | OUTPATIENT
Start: 2022-08-22

## 2022-09-08 DIAGNOSIS — E55.9 VITAMIN D DEFICIENCY: ICD-10-CM

## 2022-09-08 DIAGNOSIS — Z79.4 TYPE 2 DIABETES MELLITUS WITHOUT COMPLICATION, WITH LONG-TERM CURRENT USE OF INSULIN (HCC): Primary | ICD-10-CM

## 2022-09-08 DIAGNOSIS — E11.9 TYPE 2 DIABETES MELLITUS WITHOUT COMPLICATION, WITH LONG-TERM CURRENT USE OF INSULIN (HCC): Primary | ICD-10-CM

## 2022-09-12 RX ORDER — BUTALBITAL, ACETAMINOPHEN AND CAFFEINE 50; 325; 40 MG/1; MG/1; MG/1
1 TABLET ORAL EVERY 6 HOURS PRN
Qty: 90 TABLET | Refills: 3 | Status: SHIPPED | OUTPATIENT
Start: 2022-09-12

## 2022-09-23 ENCOUNTER — OFFICE VISIT (OUTPATIENT)
Dept: NEUROLOGY | Age: 47
End: 2022-09-23
Payer: COMMERCIAL

## 2022-09-23 VITALS
SYSTOLIC BLOOD PRESSURE: 140 MMHG | WEIGHT: 235 LBS | HEIGHT: 67 IN | DIASTOLIC BLOOD PRESSURE: 100 MMHG | BODY MASS INDEX: 36.88 KG/M2

## 2022-09-23 DIAGNOSIS — F15.90 CAFFEINE USE DISORDER: ICD-10-CM

## 2022-09-23 DIAGNOSIS — G43.119 INTRACTABLE MIGRAINE WITH AURA WITHOUT STATUS MIGRAINOSUS: Primary | ICD-10-CM

## 2022-09-23 PROCEDURE — 99204 OFFICE O/P NEW MOD 45 MIN: CPT | Performed by: PSYCHIATRY & NEUROLOGY

## 2022-09-23 RX ORDER — TOPIRAMATE 25 MG/1
TABLET ORAL
Qty: 124 TABLET | Refills: 5 | Status: SHIPPED
Start: 2022-09-23 | End: 2022-10-17

## 2022-09-23 ASSESSMENT — ENCOUNTER SYMPTOMS
EYES NEGATIVE: 1
ALLERGIC/IMMUNOLOGIC NEGATIVE: 1
RESPIRATORY NEGATIVE: 1
GASTROINTESTINAL NEGATIVE: 1

## 2022-09-23 NOTE — PROGRESS NOTES
Neurology Consult Note:    Patient: Joi Hutchison  : 1975  Date: 22  Referring provider: Sylvester Walters DO    Referral to Neurology:chronic migraine with visual aura OD    Dear Sylvester Walters DO:    Thank you for your referral of Joi Hutchison to the Neurology clinic, referred for chronic episodic migraines associated with a visual aura of the right eye. Below is a summary of her headache history obtained today:.    Headache history: Onset:high school  Location: usually rt-sided and back of head, behind rt. eye  Pain types:throbs  Positive symptoms: visual aura of OD, nausea,sonophobia, photophobia  Frequency: 4/month, also milder headaches  Duration: hrs-several days  Triggers: flashing lts. , bright light, weather changes  Caffeine:Twelve 8 oz. cups coffee/day. Family hx:mother, sisters with migraine  Meds. Used/tried: Fiorcet prn, Effexor; Nurtec qod-helping with migraines; Effexor XR-depression/anxiety, prn sumatriptan-didn't help; lisinopril for BP; doesn't recall taking topiramate. Lab Data: reviewed from 2/10/22 10/29/21    Imaging Data: 22, MR brain scan: no focal acute intracranial abnl.     Current Outpatient Medications   Medication Sig Dispense Refill    topiramate (TOPAMAX) 25 MG tablet Start one twice daily x 2 wks, then 1 in a.m. & 2 in p.m. x 2 wks, then may increase to 2 twice daily 124 tablet 5    butalbital-acetaminophen-caffeine (FIORICET, ESGIC) -40 MG per tablet Take 1 tablet by mouth every 6 hours as needed for Headaches or Migraine 90 tablet 3    DEXILANT 60 MG CPDR delayed release capsule TAKE 1 CAPSULE BY MOUTH  DAILY 90 capsule 3    buPROPion (WELLBUTRIN XL) 150 MG extended release tablet TAKE 1 TABLET BY MOUTH EVERY DAY IN THE MORNING 90 tablet 1    doxycycline hyclate (VIBRAMYCIN) 100 MG capsule TAKE 1 CAPSULE BY MOUTH EVERY DAY WITH FOOD      Rimegepant Sulfate 75 MG TBDP Take 75 mg by mouth every other day 45 tablet 1    Blood Glucose Monitoring Suppl (CONTOUR BLOOD GLUCOSE SYSTEM) w/Device KIT Test sugar 3 times daily 1 kit 0    blood glucose test strips (CONTOUR TEST) strip 1 each by In Vitro route 3 times daily As needed. 300 each 3    metFORMIN (GLUCOPHAGE) 1000 MG tablet TAKE 1 TABLET BY MOUTH  TWICE DAILY WITH MEALS 180 tablet 3    insulin lispro (HUMALOG) 100 UNIT/ML SOLN injection vial INJECT SUBCUTANEOUSLY VIA  INSULIN PUMP MAX 75 UNITS  PER DAY 70 mL 5    VICTOZA 18 MG/3ML SOPN SC injection Inject 1.8 mg into the skin daily 9 pen 5    Insulin Pen Needle (BD PEN NEEDLE WEST U/F) 32G X 4 MM MISC Uses once per day 100 each 3    venlafaxine (EFFEXOR XR) 150 MG extended release capsule TAKE 1 CAPSULE BY MOUTH  DAILY 90 capsule 3    lisinopril (PRINIVIL;ZESTRIL) 20 MG tablet TAKE 1 TABLET BY MOUTH  DAILY 90 tablet 3    rosuvastatin (CRESTOR) 5 MG tablet TAKE 1 TABLET BY MOUTH AT  NIGHT 90 tablet 3    ferrous sulfate (IRON 325) 325 (65 Fe) MG tablet Take 1 tablet by mouth daily (with breakfast) 90 tablet 1    Insulin Infusion Pump (MINIMED 770G INSULIN PUMP SYS) KIT To infuse insulin 1 kit 0    Continuous Blood Gluc Transmit (GUARDIAN LINK 3 TRANSMITTER) MISC To use with sensors 1 each 0    Continuous Blood Gluc Sensor (GUARDIAN SENSOR 3) MISC To change every 7 days 12 each 3    albuterol sulfate  (90 Base) MCG/ACT inhaler Inhale 2 puffs into the lungs every 4 hours as needed for Wheezing 1 Inhaler 0    Insulin Pen Needle (BD ULTRA-FINE PEN NEEDLES) 29G X 12.7MM MISC 1 each by Does not apply route 2 times daily Bd ultrafine needles to use with victoza pen 200 each 3     No current facility-administered medications for this visit.        No Known Allergies    Patient Active Problem List   Diagnosis    GERD (gastroesophageal reflux disease)    Essential hypertension    Type 2 diabetes mellitus without complication, without long-term current use of insulin ([facility])    Familial hyperlipidemia    Irritable bowel syndrome with diarrhea    Dysfunction of both eustachian tubes    DUB (dysfunctional uterine bleeding)    Fatigue    Current mild episode of major depressive disorder without prior episode ([facility])    Migraine without aura and without status migrainosus, not intractable    Caffeine use disorder       Past Medical History:   Diagnosis Date    Caffeine use disorder 2022    Diabetes mellitus ([facility])     Fatty liver disease, nonalcoholic     GERD (gastroesophageal reflux disease)     Heart palpitations     Migraine     PCOS (polycystic ovarian syndrome)     Post partum depression        Past Surgical History:   Procedure Laterality Date     SECTION      DILATION AND CURETTAGE OF UTERUS         Family History   Problem Relation Age of Onset    High Blood Pressure Mother     High Blood Pressure Father     Diabetes Father     Cancer Maternal Grandmother         unknown if it was uterine, ovarian or cervix     Diabetes Paternal Grandfather        Social History     Socioeconomic History    Marital status:      Spouse name: Not on file    Number of children: Not on file    Years of education: Not on file    Highest education level: Not on file   Occupational History     Employer: [employer]   Tobacco Use    Smoking status: Former     Years: 10.00     Types: Cigars, Cigarettes     Quit date: 2016     Years since quittin.8    Smokeless tobacco: Never    Tobacco comments:     Cigars 4 daily   Vaping Use    Vaping Use: Former   Substance and Sexual Activity    Alcohol use: No     Comment: occ.     Drug use: No    Sexual activity: Yes     Partners: Male   Other Topics Concern    Not on file   Social History Narrative    Not on file     Social Determinants of Health     Financial Resource Strain: Low Risk     Difficulty of Paying Living Expenses: Not hard at all   Food Insecurity: No Food Insecurity    Worried About Running Out of Food in the Last Year: Never true    Ran Out of Food in the Last Year: Never true   Transportation Needs: Not on file   Physical Activity: Not on file   Stress: Not on file   Social Connections: Not on file   Intimate Partner Violence: Not on file   Housing Stability: Not on file     Review of Systems   Constitutional: Negative. HENT: Negative. Eyes: Negative. Respiratory: Negative. Cardiovascular: Negative. Gastrointestinal: Negative. Endocrine: Negative. Musculoskeletal: Negative. Skin: Negative. Allergic/Immunologic: Negative. Neurological:  Positive for headaches. Hematological: Negative. Psychiatric/Behavioral:  The patient is nervous/anxious. All other systems reviewed and are negative. Neurologic Exam:  BP (!) 140/100 (Site: Right Upper Arm, Position: Sitting, Cuff Size: Medium Adult)   Ht 5' 7\" (1.702 m)   Wt 235 lb (106.6 kg)   BMI 36.81 kg/m²   General appearance: Alert, cooperative, obese, well-nourished, well groomed, seated in the exam room, no acute distress  HEENT: Normocephalic/atraumatic.   Neck: Supple  Cardiac: RRR  Respiratory: grossly clear  Extremities: Obese, without edema, erythema or cyanosis  Skin: No apparent lesions or rashes  Musculoskeletal: No fasciculations or tremors  Mental Status: Alert, oriented x3  Speech/Language: Clear, fluent  Attention span/Concentration: Grossly intact  Affect/Mood: Mildly anxious  Insight/Judgement: Fairly good     Fund of Knowledge/Current events: Grossly intact  CN II-XII:     Pupils: Equal, reactive to light, 2.0 mm     EOM's: Full without nystagmus  Visual Fields: Full to confrontation  Fundi: Miosis to light, grossly unremarkable  CN V: normal V1-V3  CN VII: No facial droop with symmetric smile  CN VIII: Hearing grossly intact  CN IX-XII: Tongue midline  SCM/Trapezii: 5/5 power  Motor: 5/5 power in the upper and lower extremities without tremor or drift and normal motor tone, intact fine motor function of both hands, symmetric  DTR's: 1+-2+ and symmetric in the upper and lower extremities, no ankle clonus, plantar responses are flexor  Sensory: Grossly intact subjectively to light touch and sharp stick testing throughout  Coordination/Gait: No gross limb dysmetria on finger-to-nose testing, no truncal or cerebellar gait ataxia    Assessment/Plan:  1. Chronic episodic migraine headaches associated with a visual aura of the right eye.  2.  Caffeine overuse, drinks 12 cups of coffee per day which I have advised her to begin decreasing as likely is contributing to i perpetuation of the migraine headache pain syndrome. 3.  She finds relief in taking Nurtec 75 mg every other day with decreased migraine frequency and intensity however has other milder headaches throughout the month which may respond better to the Nurtec when she has significantly decreased her caffeine intake. 4.  A low-dose trial of topiramate was discussed and ordered, starting 25 mg twice daily for 2 weeks, then may increase to 25 mg in the morning and 50 mg at bedtime for 2 weeks, then 50 mg twice daily if clinically indicated for migraine prophylaxis. 5.  Follow-up in the Neurology clinic in 8 weeks otherwise. 6.  Patient information was provided from the National headache foundation website    Sincerely,      Tee Juárez MD    Neurology & Clinical Neurophysiology    This note was created using speech recognition transcription software. Despite proofreading, there may be several typographical errors present that may affect the meaning of the content. Please call with any questions. A total time of 40 mins. was spent on the date of service in preparation for this visit, which included face-to-face patient care, completing clinical documentation, counseling and coordination of care based on clinical impression, neurologic diagnosis, review of pertinent imaging studies, test results, implementation and discussion of treatment plan, risk factor reduction and patient and/or family education.

## 2022-10-12 DIAGNOSIS — E11.9 TYPE 2 DIABETES MELLITUS WITHOUT COMPLICATION, WITH LONG-TERM CURRENT USE OF INSULIN (HCC): ICD-10-CM

## 2022-10-12 DIAGNOSIS — E11.9 TYPE 2 DIABETES MELLITUS WITHOUT COMPLICATION, WITHOUT LONG-TERM CURRENT USE OF INSULIN (HCC): ICD-10-CM

## 2022-10-12 DIAGNOSIS — Z79.4 TYPE 2 DIABETES MELLITUS WITHOUT COMPLICATION, WITH LONG-TERM CURRENT USE OF INSULIN (HCC): ICD-10-CM

## 2022-10-12 DIAGNOSIS — E55.9 VITAMIN D DEFICIENCY: ICD-10-CM

## 2022-10-12 LAB
ALBUMIN SERPL-MCNC: 4.4 G/DL (ref 3.5–5.2)
ALP BLD-CCNC: 62 U/L (ref 35–104)
ALT SERPL-CCNC: 13 U/L (ref 0–32)
ANION GAP SERPL CALCULATED.3IONS-SCNC: 16 MMOL/L (ref 7–16)
AST SERPL-CCNC: 18 U/L (ref 0–31)
BILIRUB SERPL-MCNC: 0.2 MG/DL (ref 0–1.2)
BUN BLDV-MCNC: 10 MG/DL (ref 6–20)
CALCIUM SERPL-MCNC: 9.1 MG/DL (ref 8.6–10.2)
CHLORIDE BLD-SCNC: 105 MMOL/L (ref 98–107)
CHOLESTEROL, TOTAL: 128 MG/DL (ref 0–199)
CO2: 20 MMOL/L (ref 22–29)
CREAT SERPL-MCNC: 0.8 MG/DL (ref 0.5–1)
CREATININE URINE: 17 MG/DL (ref 29–226)
GFR AFRICAN AMERICAN: >60
GFR NON-AFRICAN AMERICAN: >60 ML/MIN/1.73
GLUCOSE BLD-MCNC: 115 MG/DL (ref 74–99)
HBA1C MFR BLD: 6.4 % (ref 4–5.6)
HDLC SERPL-MCNC: 38 MG/DL
LDL CHOLESTEROL CALCULATED: 63 MG/DL (ref 0–99)
MICROALBUMIN UR-MCNC: 32.3 MG/L
MICROALBUMIN/CREAT UR-RTO: 190 (ref 0–30)
POTASSIUM SERPL-SCNC: 3.6 MMOL/L (ref 3.5–5)
SODIUM BLD-SCNC: 141 MMOL/L (ref 132–146)
T4 FREE: 1.28 NG/DL (ref 0.93–1.7)
TOTAL PROTEIN: 7 G/DL (ref 6.4–8.3)
TRIGL SERPL-MCNC: 133 MG/DL (ref 0–149)
TSH SERPL DL<=0.05 MIU/L-ACNC: 0.76 UIU/ML (ref 0.27–4.2)
VITAMIN D 25-HYDROXY: 64 NG/ML (ref 30–100)
VLDLC SERPL CALC-MCNC: 27 MG/DL

## 2022-10-16 DIAGNOSIS — G43.119 INTRACTABLE MIGRAINE WITH AURA WITHOUT STATUS MIGRAINOSUS: ICD-10-CM

## 2022-10-17 RX ORDER — TOPIRAMATE 25 MG/1
TABLET ORAL
Qty: 124 TABLET | Refills: 5 | Status: SHIPPED | OUTPATIENT
Start: 2022-10-17

## 2022-10-20 ENCOUNTER — OFFICE VISIT (OUTPATIENT)
Dept: ENDOCRINOLOGY | Age: 47
End: 2022-10-20
Payer: COMMERCIAL

## 2022-10-20 VITALS
HEART RATE: 90 BPM | HEIGHT: 67 IN | SYSTOLIC BLOOD PRESSURE: 145 MMHG | BODY MASS INDEX: 36.1 KG/M2 | WEIGHT: 230 LBS | OXYGEN SATURATION: 100 % | DIASTOLIC BLOOD PRESSURE: 91 MMHG

## 2022-10-20 DIAGNOSIS — Z79.4 TYPE 2 DIABETES MELLITUS WITH MICROALBUMINURIA, WITH LONG-TERM CURRENT USE OF INSULIN (HCC): Primary | ICD-10-CM

## 2022-10-20 DIAGNOSIS — R80.9 TYPE 2 DIABETES MELLITUS WITH MICROALBUMINURIA, WITH LONG-TERM CURRENT USE OF INSULIN (HCC): Primary | ICD-10-CM

## 2022-10-20 DIAGNOSIS — E66.09 CLASS 2 OBESITY DUE TO EXCESS CALORIES WITHOUT SERIOUS COMORBIDITY WITH BODY MASS INDEX (BMI) OF 36.0 TO 36.9 IN ADULT: ICD-10-CM

## 2022-10-20 DIAGNOSIS — E78.5 HYPERLIPIDEMIA, UNSPECIFIED HYPERLIPIDEMIA TYPE: ICD-10-CM

## 2022-10-20 DIAGNOSIS — E11.29 TYPE 2 DIABETES MELLITUS WITH MICROALBUMINURIA, WITH LONG-TERM CURRENT USE OF INSULIN (HCC): Primary | ICD-10-CM

## 2022-10-20 DIAGNOSIS — E55.9 VITAMIN D DEFICIENCY: ICD-10-CM

## 2022-10-20 PROCEDURE — 99214 OFFICE O/P EST MOD 30 MIN: CPT | Performed by: CLINICAL NURSE SPECIALIST

## 2022-10-20 PROCEDURE — 3044F HG A1C LEVEL LT 7.0%: CPT | Performed by: CLINICAL NURSE SPECIALIST

## 2022-10-20 PROCEDURE — 95251 CONT GLUC MNTR ANALYSIS I&R: CPT | Performed by: CLINICAL NURSE SPECIALIST

## 2022-10-20 NOTE — PROGRESS NOTES
[address] [facility] Department of Endocrinology Diabetes and Metabolism   [address] [facility] [address]   Phone: [phone]  Fax: [phone]    Date of Service: 10/20/2022    Primary Care Physician: Trudi Pereyra DO  Referring physician: No ref. provider found  Provider: TONI Carreno     Reason for the visit:  Type 2 DM     History of Present Illness: The history is provided by the patient. No  was used. Accuracy of the patient data is excellent. Dorian Alvarez is a very pleasant 41 y.o. female seen today for diabetes management     Dorian Alvarez was diagnosed with diabetes early 35s.  Started as gestational diabetes and she is currently on 5g Medtronic insulin pump with CGM   On Metformin 1000 mg BID   Victoza 1.8 mg daily   Current pump settings: Basal rate 0.7, CR 12a 11, 10a 12, 1p 12, ISF 56, goal 100-150, active insulin time 3 hrs   Lab Results   Component Value Date/Time    LABA1C 6.4 10/12/2022 08:08 AM    LABA1C 7.2 06/27/2022 08:04 AM    LABA1C 6.5 02/10/2022 08:17 AM     Patient has had no hypoglycemic episodes   Very good with following diabetes diet and encouraged   I reviewed current medications and the patient has no issues with diabetes medications  Dorian Alvarez is up to date with eye exam and denied any history of diabetic retinopathy, retinopathy   The patient performs her own feet care and doesn't see podiatry   Microvascular complications:  No Retinopathy, Nephropathy or Neuropathy   Macrovascular complications: no CAD, PVD, or Stroke  On statin      PAST MEDICAL HISTORY   Past Medical History:   Diagnosis Date    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 Her smoking use included cigars and cigarettes. She has never used smokeless tobacco.  Alcohol:   reports no history of alcohol use. Drugs:   reports no history of drug use.     FAMILY HISTORY   Family History   Problem Relation Age of Onset    High Blood Pressure Mother     High Blood Pressure Father     Diabetes Father     Cancer Maternal Grandmother         unknown if it was uterine, ovarian or cervix     Diabetes Paternal Grandfather        ALLERGIES AND DRUG REACTIONS   No Known Allergies    CURRENT MEDICATIONS   Current Outpatient Medications   Medication Sig Dispense Refill    metFORMIN (GLUCOPHAGE) 1000 MG tablet TAKE 1 TABLET BY MOUTH  TWICE DAILY WITH MEALS 180 tablet 3    insulin lispro (HUMALOG) 100 UNIT/ML SOLN injection vial INJECT SUBCUTANEOUSLY VIA  INSULIN PUMP MAX 75 UNITS  PER DAY 70 mL 5    VICTOZA 18 MG/3ML SOPN SC injection Inject 1.8 mg into the skin daily 9 pen 5    Insulin Infusion Pump (MINIMED 770G INSULIN PUMP SYS) KIT To infuse insulin 1 kit 0    topiramate (TOPAMAX) 25 MG tablet START 1 TAB TWICE DAILY X2 WEEKS, THEN 1 TAB IN THE MORNING & 2 TABS IN THE EVENING X2 WEEKS, THEN MAY INCREASE TO 2 TABS TWICE DAILY 124 tablet 5    butalbital-acetaminophen-caffeine (FIORICET, ESGIC) -40 MG per tablet Take 1 tablet by mouth every 6 hours as needed for Headaches or Migraine 90 tablet 3    DEXILANT 60 MG CPDR delayed release capsule TAKE 1 CAPSULE BY MOUTH  DAILY 90 capsule 3    buPROPion (WELLBUTRIN XL) 150 MG extended release tablet TAKE 1 TABLET BY MOUTH EVERY DAY IN THE MORNING 90 tablet 1    doxycycline hyclate (VIBRAMYCIN) 100 MG capsule TAKE 1 CAPSULE BY MOUTH EVERY DAY WITH FOOD      Rimegepant Sulfate 75 MG TBDP Take 75 mg by mouth every other day 45 tablet 1    Blood Glucose Monitoring Suppl (CONTOUR BLOOD GLUCOSE SYSTEM) w/Device KIT Test sugar 3 times daily 1 kit 0    blood glucose test strips (CONTOUR TEST) strip 1 each by In Vitro route 3 times daily As needed. 300 each 3    Insulin Pen Needle (BD PEN NEEDLE WEST U/F) 32G X 4 MM MISC Uses once per day 100 each 3    venlafaxine (EFFEXOR XR) 150 MG extended release capsule TAKE 1 CAPSULE BY MOUTH  DAILY 90 capsule 3    lisinopril (PRINIVIL;ZESTRIL) 20 MG tablet TAKE 1 TABLET BY MOUTH  DAILY 90 tablet 3    rosuvastatin (CRESTOR) 5 MG tablet TAKE 1 TABLET BY MOUTH AT  NIGHT 90 tablet 3    ferrous sulfate (IRON 325) 325 (65 Fe) MG tablet Take 1 tablet by mouth daily (with breakfast) 90 tablet 1    Continuous Blood Gluc Transmit (GUARDIAN LINK 3 TRANSMITTER) MISC To use with sensors 1 each 0    Continuous Blood Gluc Sensor (GUARDIAN SENSOR 3) MISC To change every 7 days 12 each 3    albuterol sulfate  (90 Base) MCG/ACT inhaler Inhale 2 puffs into the lungs every 4 hours as needed for Wheezing 1 Inhaler 0    Insulin Pen Needle (BD ULTRA-FINE PEN NEEDLES) 29G X 12.7MM MISC 1 each by Does not apply route 2 times daily Bd ultrafine needles to use with victoza pen 200 each 3     No current facility-administered medications for this visit. Review of Systems  Constitutional: No fever, no chills, no diaphoresis, no generalized weakness. HEENT: No blurred vision, No sore throat, no ear pain, no hair loss  Neck: denied any neck swelling, difficulty swallowing,   Cardio-pulmonary: No CP, SOB or palpitation, No orthopnea or PND. No cough or wheezing. GI: No N/V/D, no constipation, No abdominal pain, no melena or hematochezia   : Denied any dysuria, hematuria, flank pain, discharge, or incontinence. Skin: denied any rash, ulcer, Hirsute, or hyperpigmentation. MSK: denied any joint deformity, joint pain/swelling, muscle pain, or back pain.   Neuro: no numbness, no tingling, no weakness, _    OBJECTIVE    BP (!) 145/91   Pulse 90   Ht 5' 7\" (1.702 m)   Wt 230 lb (104.3 kg) SpO2 100%   BMI 36.02 kg/m²   BP Readings from Last 4 Encounters:   10/20/22 (!) 145/91   09/23/22 (!) 140/100   07/18/22 128/70   06/27/22 127/85     Wt Readings from Last 6 Encounters:   10/20/22 230 lb (104.3 kg)   09/23/22 235 lb (106.6 kg)   07/18/22 235 lb (106.6 kg)   06/27/22 230 lb (104.3 kg)   03/07/22 232 lb (105.2 kg)   02/10/22 233 lb 12.8 oz (106.1 kg)       Physical examination:  General: awake alert, oriented x3, no abnormal position or movements. HEENT: normocephalic non-traumatic, no exophthalmos   Neck: supple, no LN enlargement, no thyromegaly, no thyroid tenderness, no JVD. Pulm: Clear equal air entry no added sounds, no wheezing or rhonchi    CVS: S1 + S2, no murmur, no heave. Dorsalis pedis pulse palpable   Abd: soft lax, no tenderness, no organomegaly, audible bowel sounds. Skin: warm, no lesions, no rash.  No callus, no Ulcers, No acanthosis nigricans  Musculoskeletal: No back tenderness, no kyphosis/scoliosis    Neuro: CN intact, Monofilament sensation decreased bilateral , muscle power normal  Psych: normal mood, and affect      Review of Laboratory Data:  I personally reviewed the following lab:  Lab Results   Component Value Date/Time    WBC 9.4 08/13/2021 09:59 AM    RBC 4.80 08/13/2021 09:59 AM    HGB 10.2 (L) 08/13/2021 09:59 AM    HCT 35.3 08/13/2021 09:59 AM    MCV 73.5 (L) 08/13/2021 09:59 AM    MCH 21.3 (L) 08/13/2021 09:59 AM    MCHC 28.9 (L) 08/13/2021 09:59 AM    RDW 19.8 (H) 08/13/2021 09:59 AM     08/13/2021 09:59 AM    MPV 12.6 (H) 08/13/2021 09:59 AM      Lab Results   Component Value Date/Time     10/12/2022 08:08 AM    K 3.6 10/12/2022 08:08 AM    CO2 20 (L) 10/12/2022 08:08 AM    BUN 10 10/12/2022 08:08 AM    CREATININE 0.8 10/12/2022 08:08 AM    CALCIUM 9.1 10/12/2022 08:08 AM    LABGLOM >60 10/12/2022 08:08 AM    GFRAA >60 10/12/2022 08:08 AM      Lab Results   Component Value Date/Time    TSH 0.763 10/12/2022 08:08 AM    T4FREE 1.28 10/12/2022 08:08 AM     Lab Results   Component Value Date/Time    LABA1C 6.4 10/12/2022 08:08 AM    GLUCOSE 115 10/12/2022 08:08 AM    GLUCOSE 124 02/01/2012 09:40 PM    MALBCR 190.0 10/12/2022 08:11 AM    LABMICR 32.3 10/12/2022 08:11 AM    LABCREA 17 10/12/2022 08:11 AM     Lab Results   Component Value Date/Time    LABA1C 6.4 10/12/2022 08:08 AM    LABA1C 7.2 06/27/2022 08:04 AM    LABA1C 6.5 02/10/2022 08:17 AM     Lab Results   Component Value Date/Time    TRIG 133 10/12/2022 08:08 AM    HDL 38 10/12/2022 08:08 AM    LDLCALC 63 10/12/2022 08:08 AM    CHOL 128 10/12/2022 08:08 AM     Lab Results   Component Value Date/Time    VITD25 64 10/12/2022 08:08 AM    VITD25 79 10/29/2021 07:55 AM       ASSESSMENT & RECOMMENDATIONS   Kristen Yadav, a 41 y.o.-old female seen in for the following issues       Assessment:      Diagnosis Orders   1. Type 2 diabetes mellitus without complication, with long-term current use of insulin (HCC)  MO CONTINUOUS GLUCOSE MONITORING ANALYSIS I&R      2. Vitamin D deficiency        3. Hyperlipidemia, unspecified hyperlipidemia type        4. Class 2 obesity due to excess calories without serious comorbidity with body mass index (BMI) of 36.0 to 36.9 in adult              Plan:     1. Type 2 diabetes mellitus with microalbuminuria  Patient's diabetes is well controlled. Hemoglobin A1c 6.4%  Insulin pump and CGM reviewed  Plan: Continue insulin pump settings as above  Continue Victoza 1.8 mg daily  Continue metformin 1000 mg twice daily  The patient was advised to check blood sugars 4 times per day and as neededd  No recent hypoglycemia   Encourage diet and exercise  Continue ACE inhibitor for microalbuminuria     2. Vitamin D deficiency   Continue Vitamin D supplementation  Last vitamin D within normal limits  Counseled on the importance of vitamin D and bone health   3. Hyperlipidemia, unspecified hyperlipidemia type   Continue rosuvastatin. Last lipids stable   4.  Class 2 obesity due to excess calories without serious comorbidity with body mass index (BMI) of 36.0 to 36.9 in adult   Discussed lifestyle changes including diet and exercise with patient in depth. Also discussed with patient cardiovascular risk associated with obesity       I personally spent > 30 minutes  Reviewing  external notes from PCP and other patient's care team providers, and personally interpreted labs associated with the above diagnosis. I also ordered labs to further assess and manage the above addressed medical conditions. Return in about 3 months (around 1/20/2023). The above issues were reviewed with the patient who understood and agreed with the plan. Thank you for allowing us to participate in the care of this patient. Please do not hesitate to contact us with any additional questions. TONI Martinez     [facility]   [address] [place] [address]   Phone: [phone]  Fax: [phone]  --------------------------------------------  An electronic signature was used to authenticate this note.  TONI Martinez   on 10/20/2022 at 8:08 AM

## 2022-11-14 RX ORDER — DEXLANSOPRAZOLE 60 MG/1
CAPSULE, DELAYED RELEASE ORAL
Qty: 90 CAPSULE | Refills: 3 | Status: SHIPPED | OUTPATIENT
Start: 2022-11-14

## 2022-11-18 ENCOUNTER — TELEMEDICINE (OUTPATIENT)
Dept: NEUROLOGY | Age: 47
End: 2022-11-18
Payer: COMMERCIAL

## 2022-11-18 DIAGNOSIS — G43.119 INTRACTABLE MIGRAINE WITH AURA WITHOUT STATUS MIGRAINOSUS: Primary | ICD-10-CM

## 2022-11-18 DIAGNOSIS — F15.90 CAFFEINE USE DISORDER: ICD-10-CM

## 2022-11-18 DIAGNOSIS — G44.40 REBOUND HEADACHE: ICD-10-CM

## 2022-11-18 PROCEDURE — 99212 OFFICE O/P EST SF 10 MIN: CPT | Performed by: PSYCHIATRY & NEUROLOGY

## 2022-11-18 NOTE — PROGRESS NOTES
[address] NEUROLOGY  [address] [facility] [place] [address]  Dept: [phone]: [phone]    Telehealth follow up Note      George García     Date of Visit:  11/18/2022  Primary Provider: Oneida Bowman DO    CC:  Telehealth Neurology follow up, chronic migraine with visual aura, OD  Chief Complaint   Patient presents with    Headache     8wk follow up     Consent:  The patient and/or health care decision maker is aware that that he may receive a bill for this tele-health service [employer], depending on his insurance coverage, and has provided verbal consent to proceed: Yes  My patient is aware that they will need a follow-up visit (in-person or virtually) at the appropriate time indicated for continued medications. Further, my patient is aware that when this acute crisis has lifted, they will be expected to return for an in-person visit and all elements of standard local and hospital guidelines in order to continue this medication. HPI: 42 y/o with hx migraine with aura of the OD, reporting clinical improvement with prn Nurtec 75 mg qod, #16/month and 50 mg of topiramate twice daily x 2 wks. . She also reports success with decreasing caffeine intake from 3 pots of coffee per day to 1 pot of coffee per day since her last visit but is probably still experiencing a rebound headache pain syndrome effect from the caffeine overuse syndrome. Headache pain is better. Appropriate analgesia with current medications regimen: yes -topiramate 50 mg twice daily, Nurtec 75 mg strength tablet every other day, 16/month maximum. .    Change in quality of symptoms:no. Medication side effects:fatigue. Lab data: Reviewed from 10/12/2022, blood glucose 115, hemoglobin A1c 6.4.     Imaging data: 7/31/2022, MR brain scan without contrast; no focal acute intracranial abnormality, nonspecific subcortical white matter microangiopathy, probably related to patient's history of migraines, history of hypertension, Leukos impairment. Past Medical History: Reviewed    Past Surgical History: Reviewed     Home Medications: Reviewed    Current Outpatient Medications   Medication Sig Dispense Refill    butalbital-acetaminophen-caffeine (FIORICET, ESGIC) -40 MG per tablet Take 1 tablet by mouth every 6 hours as needed for Headaches or Migraine 90 tablet 3    buPROPion (WELLBUTRIN XL) 150 MG extended release tablet TAKE 1 TABLET BY MOUTH EVERY DAY IN THE MORNING 90 tablet 3    Rimegepant Sulfate 75 MG TBDP Take 75 mg by mouth every other day 48 tablet 3    DEXILANT 60 MG CPDR delayed release capsule TAKE 1 CAPSULE BY MOUTH EVERY DAY 90 capsule 3    topiramate (TOPAMAX) 25 MG tablet START 1 TAB TWICE DAILY X2 WEEKS, THEN 1 TAB IN THE MORNING & 2 TABS IN THE EVENING X2 WEEKS, THEN MAY INCREASE TO 2 TABS TWICE DAILY 124 tablet 5    doxycycline hyclate (VIBRAMYCIN) 100 MG capsule TAKE 1 CAPSULE BY MOUTH EVERY DAY WITH FOOD      Blood Glucose Monitoring Suppl (CONTOUR BLOOD GLUCOSE SYSTEM) w/Device KIT Test sugar 3 times daily 1 kit 0    blood glucose test strips (CONTOUR TEST) strip 1 each by In Vitro route 3 times daily As needed.  300 each 3    metFORMIN (GLUCOPHAGE) 1000 MG tablet TAKE 1 TABLET BY MOUTH  TWICE DAILY WITH MEALS 180 tablet 3    insulin lispro (HUMALOG) 100 UNIT/ML SOLN injection vial INJECT SUBCUTANEOUSLY VIA  INSULIN PUMP MAX 75 UNITS  PER DAY 70 mL 5    VICTOZA 18 MG/3ML SOPN SC injection Inject 1.8 mg into the skin daily 9 pen 5    Insulin Pen Needle (BD PEN NEEDLE WEST U/F) 32G X 4 MM MISC Uses once per day 100 each 3    venlafaxine (EFFEXOR XR) 150 MG extended release capsule TAKE 1 CAPSULE BY MOUTH  DAILY 90 capsule 3    lisinopril (PRINIVIL;ZESTRIL) 20 MG tablet TAKE 1 TABLET BY MOUTH  DAILY 90 tablet 3    rosuvastatin (CRESTOR) 5 MG tablet TAKE 1 TABLET BY MOUTH AT  NIGHT 90 tablet 3    ferrous sulfate (IRON 325) 325 (65 Fe) MG tablet Take 1 tablet by mouth daily (with breakfast) 90 tablet 1    Insulin Infusion Pump (MINIMED 770G INSULIN PUMP SYS) KIT To infuse insulin 1 kit 0    Continuous Blood Gluc Transmit (GUARDIAN LINK 3 TRANSMITTER) MISC To use with sensors 1 each 0    Continuous Blood Gluc Sensor (GUARDIAN SENSOR 3) MISC To change every 7 days 12 each 3    albuterol sulfate  (90 Base) MCG/ACT inhaler Inhale 2 puffs into the lungs every 4 hours as needed for Wheezing 1 Inhaler 0    Insulin Pen Needle (BD ULTRA-FINE PEN NEEDLES) 29G X 12.7MM MISC 1 each by Does not apply route 2 times daily Bd ultrafine needles to use with victoza pen 200 each 3     No current facility-administered medications for this visit. Allergies: Reviewed     Social History: Reviewed     REVIEW OF SYSTEMS:     Shahana Ozuna denies fever/chills, chest pain, shortness of breath, new bowel or bladder complaints. All other review of systems was negative. Reports some improvement of migraine with meds. Of Nurtec and topiramate 50 mg bid dosing x 2 wks. Has dec. caffeine intake form 3 pots of coffee/day to one pot/day. VIRTUAL NEUROLOGIC EXAMINATION:      General:      Build:Overweight  A & O x3  Speech/language: Clear, fluent  CN II-XII: Remains grossly intact throughout. Pupils appear equal and reactive to light. EOMs appear full without nystagmus. Visual fields are full. Facial expression and sensation are normal symmetric. Hearing is grossly intact. Tongue is midline. Motor/Sensory Exam: No focal weakness. No focal sensory or numbness complaints. Coordination: No limb incoordination or tremors. Gait:normal    HEENT:    Head:normocephalic and atraumatic  Pupils:regular, round, and equal.  Sclera: icterus absent    Lungs:    Breathing:Normal expansion. Clear to auscultation. No rales, rhonchi, or wheezing.     Musculoskeletal:    SLR:not done right, not done left, sitting     Extremities:    Tremors:None bilaterally upper and lower  Range of motion:Generally normal Dermatology:    Skin:no skin lesions    Impression/Plan:  Chronic episodic migraines associated with a visual aura of the right eye, with some clinical improvement reported in response to as needed Nurtec 75 mg strength tablet taken every other day and topiramate 50 mg twice daily for headache prophylaxis over the past 2 weeks. History caffeine overuse syndrome, rebound headache pain syndrome, patient successfully reduce caffeine intake from 3 pots of coffee per day to 1 pot of coffee per day and is likely still experiencing some effect of caffeine overuse syndrome. Follow-up in Neurology clinic in 6 weeks, prefers virtual visit. Patient advised regarding steps to help prevent the spread of COVID-19   SOURCE - [email or web address]-[email or web address].[email or web address] html     1-Stay home except to get medical care  2-Clean your hands often for at least 20 seconds, avoid touching: Avoid touching your eyes, nose, and mouth with unwashed hands. 3-Seek medical attention: Seek prompt medical attention if your illness is worsening (e.g., difficulty breathing). Call you doctor first.  3-Wear a facemask if you are sick   4-Cover your coughs and sneezes           I affirm this is a Patient Initiated Episode with an established Patient who has not had a related appointment within my department in the past 7 days or scheduled within the next 24 hours. Note: This visit was completed virtually using DO[employer]. ME    Patient was evaluated through a synchronous (real-time) audio-video encounter. The patient is aware that this is a billable service which includes applicable co-pays. This virtual visit was conducted with the patient's consent. The visit was conducted pursuant to the emergency declaration under the [facility], [address] [place] [address] authority and the [employer] General Act.   Patient identification was verified and a caregiver was present when appropriate. Patient was located in a state where the provider was licensed to provide care, I.e., state of [place]. Total Time: 25 mins. Patient location: home  Provider location: office    Cc: Referring physician    Irving Olvera M.D.   Neurology & Clinical Neurophysiology

## 2022-12-28 ENCOUNTER — PATIENT MESSAGE (OUTPATIENT)
Dept: NEUROLOGY | Age: 47
End: 2022-12-28

## 2022-12-29 NOTE — TELEPHONE ENCOUNTER
From: Orion Gallegos  To: Dr. Brenna Barrera: 12/28/2022 6:15 PM EST  Subject: Appt 1/6/23    I understand Dr. Devika Cheng is no longer seeing patients. Do I still have an appt on the 6th with a new doctor?

## 2022-12-29 NOTE — TELEPHONE ENCOUNTER
Dr. Ailyn Lemos last day is 1/10, you still have your appt with Dr. Valerio Esquivel 1/6. If she advise you will need a f/u during the time of you appt, you will be schedule with a different provider, if you wish.

## 2023-02-08 ENCOUNTER — E-VISIT (OUTPATIENT)
Dept: PRIMARY CARE CLINIC | Age: 48
End: 2023-02-08
Payer: COMMERCIAL

## 2023-02-08 DIAGNOSIS — J01.00 ACUTE NON-RECURRENT MAXILLARY SINUSITIS: Primary | ICD-10-CM

## 2023-02-08 PROCEDURE — 99422 OL DIG E/M SVC 11-20 MIN: CPT | Performed by: FAMILY MEDICINE

## 2023-02-08 RX ORDER — AMOXICILLIN AND CLAVULANATE POTASSIUM 875; 125 MG/1; MG/1
1 TABLET, FILM COATED ORAL 2 TIMES DAILY
Qty: 20 TABLET | Refills: 0 | Status: SHIPPED | OUTPATIENT
Start: 2023-02-08 | End: 2023-02-18

## 2023-02-08 ASSESSMENT — LIFESTYLE VARIABLES
PACKS_PER_DAY: 1.5
SMOKING_STATUS: NO, I'M A FORMER SMOKER
SMOKING_YEARS: 10

## 2023-02-16 ENCOUNTER — OFFICE VISIT (OUTPATIENT)
Dept: PRIMARY CARE CLINIC | Age: 48
End: 2023-02-16
Payer: COMMERCIAL

## 2023-02-16 VITALS
HEART RATE: 71 BPM | BODY MASS INDEX: 34.06 KG/M2 | SYSTOLIC BLOOD PRESSURE: 126 MMHG | OXYGEN SATURATION: 98 % | HEIGHT: 67 IN | WEIGHT: 217 LBS | RESPIRATION RATE: 16 BRPM | DIASTOLIC BLOOD PRESSURE: 74 MMHG | TEMPERATURE: 97.1 F

## 2023-02-16 DIAGNOSIS — H69.83 DYSFUNCTION OF BOTH EUSTACHIAN TUBES: Primary | ICD-10-CM

## 2023-02-16 PROCEDURE — 3074F SYST BP LT 130 MM HG: CPT | Performed by: FAMILY MEDICINE

## 2023-02-16 PROCEDURE — 99213 OFFICE O/P EST LOW 20 MIN: CPT | Performed by: FAMILY MEDICINE

## 2023-02-16 PROCEDURE — 3078F DIAST BP <80 MM HG: CPT | Performed by: FAMILY MEDICINE

## 2023-02-16 RX ORDER — FLUTICASONE PROPIONATE 50 MCG
1 SPRAY, SUSPENSION (ML) NASAL DAILY
Qty: 16 G | Refills: 2 | Status: SHIPPED | OUTPATIENT
Start: 2023-02-16

## 2023-02-16 RX ORDER — PREDNISONE 10 MG/1
TABLET ORAL
Qty: 18 TABLET | Refills: 0 | Status: SHIPPED | OUTPATIENT
Start: 2023-02-16

## 2023-02-16 SDOH — ECONOMIC STABILITY: HOUSING INSECURITY
IN THE LAST 12 MONTHS, WAS THERE A TIME WHEN YOU DID NOT HAVE A STEADY PLACE TO SLEEP OR SLEPT IN A SHELTER (INCLUDING NOW)?: PATIENT REFUSED

## 2023-02-16 SDOH — ECONOMIC STABILITY: INCOME INSECURITY: HOW HARD IS IT FOR YOU TO PAY FOR THE VERY BASICS LIKE FOOD, HOUSING, MEDICAL CARE, AND HEATING?: PATIENT DECLINED

## 2023-02-16 SDOH — ECONOMIC STABILITY: FOOD INSECURITY: WITHIN THE PAST 12 MONTHS, THE FOOD YOU BOUGHT JUST DIDN'T LAST AND YOU DIDN'T HAVE MONEY TO GET MORE.: PATIENT DECLINED

## 2023-02-16 SDOH — ECONOMIC STABILITY: FOOD INSECURITY: WITHIN THE PAST 12 MONTHS, YOU WORRIED THAT YOUR FOOD WOULD RUN OUT BEFORE YOU GOT MONEY TO BUY MORE.: PATIENT DECLINED

## 2023-02-16 ASSESSMENT — ENCOUNTER SYMPTOMS
WHEEZING: 0
ABDOMINAL PAIN: 0
APNEA: 0
EYE ITCHING: 0
VOMITING: 0
COLOR CHANGE: 0
DIARRHEA: 0
NAUSEA: 0
CHEST TIGHTNESS: 0
CONSTIPATION: 0
COUGH: 0
RHINORRHEA: 0
EYE REDNESS: 0
SINUS PRESSURE: 0
SORE THROAT: 0
BACK PAIN: 0
SHORTNESS OF BREATH: 0
EYE PAIN: 0
BLOOD IN STOOL: 0

## 2023-02-16 ASSESSMENT — PATIENT HEALTH QUESTIONNAIRE - PHQ9
5. POOR APPETITE OR OVEREATING: 0
9. THOUGHTS THAT YOU WOULD BE BETTER OFF DEAD, OR OF HURTING YOURSELF: 0
2. FEELING DOWN, DEPRESSED OR HOPELESS: 0
8. MOVING OR SPEAKING SO SLOWLY THAT OTHER PEOPLE COULD HAVE NOTICED. OR THE OPPOSITE, BEING SO FIGETY OR RESTLESS THAT YOU HAVE BEEN MOVING AROUND A LOT MORE THAN USUAL: 0
1. LITTLE INTEREST OR PLEASURE IN DOING THINGS: 0
SUM OF ALL RESPONSES TO PHQ QUESTIONS 1-9: 0
SUM OF ALL RESPONSES TO PHQ QUESTIONS 1-9: 0
7. TROUBLE CONCENTRATING ON THINGS, SUCH AS READING THE NEWSPAPER OR WATCHING TELEVISION: 0
10. IF YOU CHECKED OFF ANY PROBLEMS, HOW DIFFICULT HAVE THESE PROBLEMS MADE IT FOR YOU TO DO YOUR WORK, TAKE CARE OF THINGS AT HOME, OR GET ALONG WITH OTHER PEOPLE: 0
3. TROUBLE FALLING OR STAYING ASLEEP: 0
SUM OF ALL RESPONSES TO PHQ QUESTIONS 1-9: 0
SUM OF ALL RESPONSES TO PHQ9 QUESTIONS 1 & 2: 0
4. FEELING TIRED OR HAVING LITTLE ENERGY: 0
SUM OF ALL RESPONSES TO PHQ QUESTIONS 1-9: 0
6. FEELING BAD ABOUT YOURSELF - OR THAT YOU ARE A FAILURE OR HAVE LET YOURSELF OR YOUR FAMILY DOWN: 0

## 2023-02-16 NOTE — PROGRESS NOTES
Chief Complaint:     Chief Complaint   Patient presents with    Ear Fullness     Left ear, cant hear and feels dizzy         Ear Fullness   There is pain in the left ear. This is a new problem. The current episode started 1 to 4 weeks ago. The problem has been unchanged. There has been no fever. The pain is mild. Associated symptoms include headaches and hearing loss. Pertinent negatives include no abdominal pain, coughing, diarrhea, ear discharge, neck pain, rash, rhinorrhea, sore throat or vomiting. She has tried nothing for the symptoms. The treatment provided no relief.      Patient Active Problem List   Diagnosis    GERD (gastroesophageal reflux disease)    Essential hypertension    Type 2 diabetes mellitus without complication, without long-term current use of insulin (HCC)    Familial hyperlipidemia    Irritable bowel syndrome with diarrhea    Dysfunction of both eustachian tubes    DUB (dysfunctional uterine bleeding)    Fatigue    Current mild episode of major depressive disorder without prior episode (HCC)    Migraine without aura and without status migrainosus, not intractable    Caffeine use disorder       Past Medical History:   Diagnosis Date    Caffeine use disorder 2022    Diabetes mellitus ([facility] [address]     Fatty liver disease, nonalcoholic     GERD (gastroesophageal reflux disease)     Heart palpitations     Migraine     PCOS (polycystic ovarian syndrome)     Post partum depression        Past Surgical History:   Procedure Laterality Date     SECTION      DILATION AND CURETTAGE OF UTERUS         Current Outpatient Medications   Medication Sig Dispense Refill    predniSONE (DELTASONE) 10 MG tablet 30mg daily x3 days, then 20mg daily x3 days, then 10mg daily x3 days 18 tablet 0    fluticasone (FLONASE) 50 MCG/ACT nasal spray 1 spray by Each Nostril route daily 16 g 2    amoxicillin-clavulanate (AUGMENTIN) 875-125 MG per tablet Take 1 tablet by mouth 2 times daily for 10 days 20 tablet 0 butalbital-acetaminophen-caffeine (FIORICET, ESGIC) -40 MG per tablet Take 1 tablet by mouth every 6 hours as needed for Headaches or Migraine 90 tablet 3    buPROPion (WELLBUTRIN XL) 150 MG extended release tablet TAKE 1 TABLET BY MOUTH EVERY DAY IN THE MORNING 90 tablet 3    Rimegepant Sulfate 75 MG TBDP Take 75 mg by mouth every other day 48 tablet 3    DEXILANT 60 MG CPDR delayed release capsule TAKE 1 CAPSULE BY MOUTH EVERY DAY 90 capsule 3    topiramate (TOPAMAX) 25 MG tablet START 1 TAB TWICE DAILY X2 WEEKS, THEN 1 TAB IN THE MORNING & 2 TABS IN THE EVENING X2 WEEKS, THEN MAY INCREASE TO 2 TABS TWICE DAILY 124 tablet 5    doxycycline hyclate (VIBRAMYCIN) 100 MG capsule TAKE 1 CAPSULE BY MOUTH EVERY DAY WITH FOOD      Blood Glucose Monitoring Suppl (CONTOUR BLOOD GLUCOSE SYSTEM) w/Device KIT Test sugar 3 times daily 1 kit 0    blood glucose test strips (CONTOUR TEST) strip 1 each by In Vitro route 3 times daily As needed.  300 each 3    metFORMIN (GLUCOPHAGE) 1000 MG tablet TAKE 1 TABLET BY MOUTH  TWICE DAILY WITH MEALS 180 tablet 3    insulin lispro (HUMALOG) 100 UNIT/ML SOLN injection vial INJECT SUBCUTANEOUSLY VIA  INSULIN PUMP MAX 75 UNITS  PER DAY 70 mL 5    VICTOZA 18 MG/3ML SOPN SC injection Inject 1.8 mg into the skin daily 9 pen 5    Insulin Pen Needle (BD PEN NEEDLE WEST U/F) 32G X 4 MM MISC Uses once per day 100 each 3    venlafaxine (EFFEXOR XR) 150 MG extended release capsule TAKE 1 CAPSULE BY MOUTH  DAILY 90 capsule 3    lisinopril (PRINIVIL;ZESTRIL) 20 MG tablet TAKE 1 TABLET BY MOUTH  DAILY 90 tablet 3    rosuvastatin (CRESTOR) 5 MG tablet TAKE 1 TABLET BY MOUTH AT  NIGHT 90 tablet 3    ferrous sulfate (IRON 325) 325 (65 Fe) MG tablet Take 1 tablet by mouth daily (with breakfast) 90 tablet 1    Insulin Infusion Pump (MINIMED 770G INSULIN PUMP SYS) KIT To infuse insulin 1 kit 0    Continuous Blood Gluc Transmit (GUARDIAN LINK 3 TRANSMITTER) MISC To use with sensors 1 each 0    Continuous Blood Gluc Sensor (GUARDIAN SENSOR 3) MISC To change every 7 days 12 each 3    albuterol sulfate  (90 Base) MCG/ACT inhaler Inhale 2 puffs into the lungs every 4 hours as needed for Wheezing 1 Inhaler 0    Insulin Pen Needle (BD ULTRA-FINE PEN NEEDLES) 29G X 12.7MM MISC 1 each by Does not apply route 2 times daily Bd ultrafine needles to use with victoza pen 200 each 3     No current facility-administered medications for this visit. No Known Allergies    Social History     Socioeconomic History    Marital status:      Spouse name: None    Number of children: None    Years of education: None    Highest education level: None   Occupational History     Employer: [employer]   Tobacco Use    Smoking status: Former     Years: 10.00     Types: Cigars, Cigarettes     Quit date: 2016     Years since quittin.2    Smokeless tobacco: Never    Tobacco comments:     Cigars 4 daily   Vaping Use    Vaping Use: Former   Substance and Sexual Activity    Alcohol use: No     Comment: occ. Drug use: No    Sexual activity: Yes     Partners: Male     Social Determinants of Health     Financial Resource Strain: Unknown    Difficulty of Paying Living Expenses: Patient refused   Food Insecurity: Unknown    Worried About Running Out of Food in the Last Year: Patient refused    Ran Out of Food in the Last Year: Patient refused   Transportation Needs: Unknown    Lack of Transportation (Non-Medical): Patient refused   Housing Stability: Unknown    Unstable Housing in the Last Year: Patient refused       Family History   Problem Relation Age of Onset    High Blood Pressure Mother     High Blood Pressure Father     Diabetes Father     Cancer Maternal Grandmother         unknown if it was uterine, ovarian or cervix     Diabetes Paternal Grandfather           Review of Systems   Constitutional:  Negative for activity change, appetite change, fatigue and fever. HENT:  Positive for hearing loss.  Negative for congestion, ear discharge, ear pain, nosebleeds, rhinorrhea, sinus pressure and sore throat. Eyes:  Negative for pain, redness, itching and visual disturbance. Respiratory:  Negative for apnea, cough, chest tightness, shortness of breath and wheezing. Cardiovascular:  Negative for chest pain, palpitations and leg swelling. Gastrointestinal:  Negative for abdominal pain, blood in stool, constipation, diarrhea, nausea and vomiting. Endocrine: Negative. Genitourinary:  Negative for decreased urine volume, difficulty urinating, dysuria, frequency, hematuria and urgency. Musculoskeletal:  Negative for arthralgias, back pain, gait problem, myalgias and neck pain. Skin:  Negative for color change and rash. Allergic/Immunologic: Negative for environmental allergies and food allergies. Neurological:  Positive for headaches. Negative for dizziness, weakness, light-headedness and numbness. Hematological:  Negative for adenopathy. Does not bruise/bleed easily. Psychiatric/Behavioral:  Negative for behavioral problems, dysphoric mood and sleep disturbance. The patient is not nervous/anxious and is not hyperactive. All other systems reviewed and are negative. /74   Pulse 71   Temp 97.1 °F (36.2 °C)   Resp 16   Ht 5' 7\" (1.702 m)   Wt 217 lb (98.4 kg)   SpO2 98%   BMI 33.99 kg/m²     Physical Exam  Vitals and nursing note reviewed. Constitutional:       General: She is not in acute distress. Appearance: Normal appearance. She is well-developed. HENT:      Head: Normocephalic and atraumatic. Right Ear: Hearing, tympanic membrane and external ear normal. No tenderness. No middle ear effusion. Left Ear: Hearing, tympanic membrane and external ear normal. No tenderness. No middle ear effusion. Nose: Nose normal. No congestion or rhinorrhea. Right Turbinates: Not enlarged. Left Turbinates: Not enlarged.       Mouth/Throat:      Mouth: Mucous membranes are moist.      Tongue: No lesions. Pharynx: Oropharynx is clear. No oropharyngeal exudate or posterior oropharyngeal erythema. Eyes:      General: No scleral icterus. Conjunctiva/sclera: Conjunctivae normal.      Pupils: Pupils are equal, round, and reactive to light. Neck:      Thyroid: No thyromegaly. Cardiovascular:      Rate and Rhythm: Normal rate and regular rhythm. Heart sounds: Normal heart sounds. No murmur heard. Pulmonary:      Effort: Pulmonary effort is normal. No respiratory distress. Breath sounds: Normal breath sounds. No wheezing or rales. Abdominal:      General: Bowel sounds are normal. There is no distension. Palpations: Abdomen is soft. Tenderness: There is no abdominal tenderness. Musculoskeletal:         General: No tenderness. Normal range of motion. Cervical back: Normal range of motion and neck supple. No rigidity. No muscular tenderness. Lymphadenopathy:      Cervical: No cervical adenopathy. Skin:     General: Skin is warm and dry. Findings: No erythema or rash. Neurological:      General: No focal deficit present. Mental Status: She is alert and oriented to person, place, and time. Cranial Nerves: No cranial nerve deficit. Deep Tendon Reflexes: Reflexes are normal and symmetric.  Reflexes normal.   Psychiatric:         Mood and Affect: Mood normal.                               ASSESSMENT/PLAN:    Patient Active Problem List   Diagnosis    GERD (gastroesophageal reflux disease)    Essential hypertension    Type 2 diabetes mellitus without complication, without long-term current use of insulin ([facility])    Familial hyperlipidemia    Irritable bowel syndrome with diarrhea    Dysfunction of both eustachian tubes    DUB (dysfunctional uterine bleeding)    Fatigue    Current mild episode of major depressive disorder without prior episode ([facility])    Migraine without aura and without status migrainosus, not intractable    Caffeine use disorder       Linda Rico was seen today for ear fullness. Diagnoses and all orders for this visit:    Dysfunction of both eustachian tubes    Other orders  -     predniSONE (DELTASONE) 10 MG tablet; 30mg daily x3 days, then 20mg daily x3 days, then 10mg daily x3 days  -     fluticasone (FLONASE) 50 MCG/ACT nasal spray; 1 spray by Each Nostril route daily        Return if symptoms worsen or fail to improve. I spent 20 minutes with this patient. I spent greater than 50% of the time counseling this patient.         Ankita Ho DO  2/16/2023  8:35 AM

## 2023-02-28 ENCOUNTER — OFFICE VISIT (OUTPATIENT)
Dept: PRIMARY CARE CLINIC | Age: 48
End: 2023-02-28
Payer: COMMERCIAL

## 2023-02-28 VITALS
BODY MASS INDEX: 34.37 KG/M2 | DIASTOLIC BLOOD PRESSURE: 80 MMHG | SYSTOLIC BLOOD PRESSURE: 122 MMHG | TEMPERATURE: 98.6 F | HEART RATE: 88 BPM | OXYGEN SATURATION: 96 % | WEIGHT: 219 LBS | HEIGHT: 67 IN

## 2023-02-28 DIAGNOSIS — E11.9 TYPE 2 DIABETES MELLITUS WITHOUT COMPLICATION, WITHOUT LONG-TERM CURRENT USE OF INSULIN (HCC): ICD-10-CM

## 2023-02-28 DIAGNOSIS — F32.0 CURRENT MILD EPISODE OF MAJOR DEPRESSIVE DISORDER WITHOUT PRIOR EPISODE (HCC): Primary | ICD-10-CM

## 2023-02-28 DIAGNOSIS — R55 VASOVAGAL SYNCOPE: ICD-10-CM

## 2023-02-28 DIAGNOSIS — K21.9 GASTROESOPHAGEAL REFLUX DISEASE WITHOUT ESOPHAGITIS: ICD-10-CM

## 2023-02-28 PROCEDURE — 3079F DIAST BP 80-89 MM HG: CPT | Performed by: FAMILY MEDICINE

## 2023-02-28 PROCEDURE — 3074F SYST BP LT 130 MM HG: CPT | Performed by: FAMILY MEDICINE

## 2023-02-28 PROCEDURE — 99214 OFFICE O/P EST MOD 30 MIN: CPT | Performed by: FAMILY MEDICINE

## 2023-02-28 RX ORDER — LISINOPRIL 20 MG/1
TABLET ORAL
Qty: 90 TABLET | Refills: 3 | Status: SHIPPED | OUTPATIENT
Start: 2023-02-28

## 2023-02-28 RX ORDER — VENLAFAXINE HYDROCHLORIDE 150 MG/1
150 CAPSULE, EXTENDED RELEASE ORAL DAILY
Qty: 90 CAPSULE | Refills: 3 | Status: SHIPPED | OUTPATIENT
Start: 2023-02-28

## 2023-02-28 RX ORDER — ROSUVASTATIN CALCIUM 5 MG/1
TABLET, COATED ORAL
Qty: 90 TABLET | Refills: 3 | Status: SHIPPED | OUTPATIENT
Start: 2023-02-28

## 2023-02-28 RX ORDER — DEXLANSOPRAZOLE 60 MG/1
CAPSULE, DELAYED RELEASE ORAL
Qty: 90 CAPSULE | Refills: 3 | Status: SHIPPED | OUTPATIENT
Start: 2023-02-28

## 2023-02-28 ASSESSMENT — ENCOUNTER SYMPTOMS
EYE PAIN: 0
SINUS PRESSURE: 0
CHEST TIGHTNESS: 0
COLOR CHANGE: 0
VOMITING: 0
CONSTIPATION: 0
DIARRHEA: 0
BLOOD IN STOOL: 0
SHORTNESS OF BREATH: 0
SORE THROAT: 0
COUGH: 0
EYE REDNESS: 0
APNEA: 0
WHEEZING: 0
NAUSEA: 1
ABDOMINAL PAIN: 0
RHINORRHEA: 0
CHANGE IN BOWEL HABIT: 0
VISUAL CHANGE: 0
BACK PAIN: 0
EYE ITCHING: 0

## 2023-02-28 NOTE — PROGRESS NOTES
Chief Complaint:     Chief Complaint   Patient presents with    Dizziness     Pt states that she had ear discomfort and passed out at work and hit her head. Pt went to ER and the said it was vertigo has not happened since then. Loss of Consciousness         Dizziness  This is a recurrent problem. The current episode started in the past 7 days. The problem occurs intermittently. The problem has been resolved. Associated symptoms include fatigue, headaches and nausea. Pertinent negatives include no abdominal pain, arthralgias, change in bowel habit, chest pain, chills, congestion, coughing, diaphoresis, fever, myalgias, neck pain, numbness, rash, sore throat, urinary symptoms, vertigo, visual change, vomiting or weakness. Nothing aggravates the symptoms. Treatments tried: Went to [facility] ED. The treatment provided no relief. Loss of Consciousness  This is a new problem. The current episode started in the past 7 days. The problem has been resolved. She lost consciousness for a period of 1 to 5 minutes. Nothing aggravates the symptoms. Associated symptoms include dizziness, headaches, light-headedness, malaise/fatigue and nausea. Pertinent negatives include no abdominal pain, back pain, chest pain, diaphoresis, fever, palpitations, slurred speech, vertigo, visual change, vomiting or weakness. Treatments tried: Went to [facility] ED.      Patient Active Problem List   Diagnosis    GERD (gastroesophageal reflux disease)    Essential hypertension    Type 2 diabetes mellitus without complication, without long-term current use of insulin ([facility])    Familial hyperlipidemia    Irritable bowel syndrome with diarrhea    Dysfunction of both eustachian tubes    DUB (dysfunctional uterine bleeding)    Fatigue    Current mild episode of major depressive disorder without prior episode ([facility])    Migraine without aura and without status migrainosus, not intractable    Caffeine use disorder       Past Medical History:   Diagnosis Date Caffeine use disorder 2022    Diabetes mellitus (HCC)     Fatty liver disease, nonalcoholic     GERD (gastroesophageal reflux disease)     Heart palpitations     Migraine     PCOS (polycystic ovarian syndrome)     Post partum depression        Past Surgical History:   Procedure Laterality Date     SECTION      DILATION AND CURETTAGE OF UTERUS         Current Outpatient Medications   Medication Sig Dispense Refill    lisinopril (PRINIVIL;ZESTRIL) 20 MG tablet TAKE 1 TABLET BY MOUTH  DAILY 90 tablet 3    venlafaxine (EFFEXOR XR) 150 MG extended release capsule Take 1 capsule by mouth daily 90 capsule 3    rosuvastatin (CRESTOR) 5 MG tablet TAKE 1 TABLET BY MOUTH AT  NIGHT 90 tablet 3    metFORMIN (GLUCOPHAGE) 1000 MG tablet TAKE 1 TABLET BY MOUTH  TWICE DAILY WITH MEALS 180 tablet 3    dexlansoprazole (DEXILANT) 60 MG CPDR delayed release capsule TAKE 1 CAPSULE BY MOUTH EVERY DAY 90 capsule 3    predniSONE (DELTASONE) 10 MG tablet 30mg daily x3 days, then 20mg daily x3 days, then 10mg daily x3 days 18 tablet 0    fluticasone (FLONASE) 50 MCG/ACT nasal spray 1 spray by Each Nostril route daily 16 g 2    butalbital-acetaminophen-caffeine (FIORICET, ESGIC) -40 MG per tablet Take 1 tablet by mouth every 6 hours as needed for Headaches or Migraine 90 tablet 3    buPROPion (WELLBUTRIN XL) 150 MG extended release tablet TAKE 1 TABLET BY MOUTH EVERY DAY IN THE MORNING 90 tablet 3    Rimegepant Sulfate 75 MG TBDP Take 75 mg by mouth every other day 48 tablet 3    topiramate (TOPAMAX) 25 MG tablet START 1 TAB TWICE DAILY X2 WEEKS, THEN 1 TAB IN THE MORNING & 2 TABS IN THE EVENING X2 WEEKS, THEN MAY INCREASE TO 2 TABS TWICE DAILY 124 tablet 5    doxycycline hyclate (VIBRAMYCIN) 100 MG capsule TAKE 1 CAPSULE BY MOUTH EVERY DAY WITH FOOD      Blood Glucose Monitoring Suppl (CONTOUR BLOOD GLUCOSE SYSTEM) w/Device KIT Test sugar 3 times daily 1 kit 0    blood glucose test strips (CONTOUR TEST) strip 1 each by In Vitro route 3 times daily As needed. 300 each 3    insulin lispro (HUMALOG) 100 UNIT/ML SOLN injection vial INJECT SUBCUTANEOUSLY VIA  INSULIN PUMP MAX 75 UNITS  PER DAY 70 mL 5    VICTOZA 18 MG/3ML SOPN SC injection Inject 1.8 mg into the skin daily 9 pen 5    Insulin Pen Needle (BD PEN NEEDLE WEST U/F) 32G X 4 MM MISC Uses once per day 100 each 3    ferrous sulfate (IRON 325) 325 (65 Fe) MG tablet Take 1 tablet by mouth daily (with breakfast) 90 tablet 1    Insulin Infusion Pump (MINIMED 770G INSULIN PUMP SYS) KIT To infuse insulin 1 kit 0    Continuous Blood Gluc Transmit (GUARDIAN LINK 3 TRANSMITTER) MISC To use with sensors 1 each 0    Continuous Blood Gluc Sensor (GUARDIAN SENSOR 3) MISC To change every 7 days 12 each 3    albuterol sulfate  (90 Base) MCG/ACT inhaler Inhale 2 puffs into the lungs every 4 hours as needed for Wheezing 1 Inhaler 0    Insulin Pen Needle (BD ULTRA-FINE PEN NEEDLES) 29G X 12.7MM MISC 1 each by Does not apply route 2 times daily Bd ultrafine needles to use with victoza pen 200 each 3     No current facility-administered medications for this visit. No Known Allergies    Social History     Socioeconomic History    Marital status:      Spouse name: None    Number of children: None    Years of education: None    Highest education level: None   Occupational History     Employer: [employer]   Tobacco Use    Smoking status: Former     Years: 10.00     Types: Cigars, Cigarettes     Quit date: 2016     Years since quittin.3    Smokeless tobacco: Never    Tobacco comments:     Cigars 4 daily   Vaping Use    Vaping Use: Former   Substance and Sexual Activity    Alcohol use: No     Comment: occ.     Drug use: No    Sexual activity: Yes     Partners: Male     Social Determinants of Health     Financial Resource Strain: Unknown    Difficulty of Paying Living Expenses: Patient refused   Food Insecurity: Unknown    Worried About Running Out of Food in the Last Year: Patient refused    Ran Out of Food in the Last Year: Patient refused   Transportation Needs: Unknown    Lack of Transportation (Non-Medical): Patient refused   Housing Stability: Unknown    Unstable Housing in the Last Year: Patient refused       Family History   Problem Relation Age of Onset    High Blood Pressure Mother     High Blood Pressure Father     Diabetes Father     Cancer Maternal Grandmother         unknown if it was uterine, ovarian or cervix     Diabetes Paternal Grandfather           Review of Systems   Constitutional:  Positive for fatigue and malaise/fatigue. Negative for activity change, appetite change, chills, diaphoresis and fever. HENT:  Negative for congestion, ear pain, hearing loss, nosebleeds, rhinorrhea, sinus pressure and sore throat. Eyes:  Negative for pain, redness, itching and visual disturbance. Respiratory:  Negative for apnea, cough, chest tightness, shortness of breath and wheezing. Cardiovascular:  Positive for syncope. Negative for chest pain, palpitations and leg swelling. Gastrointestinal:  Positive for nausea. Negative for abdominal pain, blood in stool, change in bowel habit, constipation, diarrhea and vomiting. Endocrine: Negative. Genitourinary:  Negative for decreased urine volume, difficulty urinating, dysuria, frequency, hematuria and urgency. Musculoskeletal:  Negative for arthralgias, back pain, gait problem, myalgias and neck pain. Skin:  Negative for color change and rash. Allergic/Immunologic: Negative for environmental allergies and food allergies. Neurological:  Positive for dizziness, light-headedness and headaches. Negative for vertigo, weakness and numbness. Hematological:  Negative for adenopathy. Does not bruise/bleed easily. Psychiatric/Behavioral:  Negative for behavioral problems, dysphoric mood and sleep disturbance. The patient is not nervous/anxious and is not hyperactive.     All other systems reviewed and are negative. /80   Pulse 88   Temp 98.6 °F (37 °C)   Ht 5' 7\" (1.702 m)   Wt 219 lb (99.3 kg)   SpO2 96%   BMI 34.30 kg/m²     Physical Exam  Vitals and nursing note reviewed. Constitutional:       General: She is not in acute distress. Appearance: Normal appearance. She is well-developed. HENT:      Head: Normocephalic and atraumatic. Right Ear: Hearing, tympanic membrane and external ear normal. No tenderness. No middle ear effusion. Left Ear: Hearing, tympanic membrane and external ear normal. No tenderness. No middle ear effusion. Nose: Nose normal. No congestion or rhinorrhea. Right Turbinates: Not enlarged. Left Turbinates: Not enlarged. Mouth/Throat:      Mouth: Mucous membranes are moist.      Tongue: No lesions. Pharynx: Oropharynx is clear. No oropharyngeal exudate or posterior oropharyngeal erythema. Eyes:      General: No scleral icterus. Conjunctiva/sclera: Conjunctivae normal.      Pupils: Pupils are equal, round, and reactive to light. Neck:      Thyroid: No thyromegaly. Cardiovascular:      Rate and Rhythm: Normal rate and regular rhythm. Heart sounds: Normal heart sounds. No murmur heard. Pulmonary:      Effort: Pulmonary effort is normal. No respiratory distress. Breath sounds: Normal breath sounds. No wheezing or rales. Abdominal:      General: Bowel sounds are normal. There is no distension. Palpations: Abdomen is soft. Tenderness: There is no abdominal tenderness. Musculoskeletal:         General: No tenderness. Normal range of motion. Cervical back: Normal range of motion and neck supple. No rigidity. No muscular tenderness. Lymphadenopathy:      Cervical: No cervical adenopathy. Skin:     General: Skin is warm and dry. Findings: No erythema or rash. Neurological:      General: No focal deficit present. Mental Status: She is alert and oriented to person, place, and time. Cranial Nerves: No cranial nerve deficit. Deep Tendon Reflexes: Reflexes are normal and symmetric. Reflexes normal.   Psychiatric:         Mood and Affect: Mood normal.                               ASSESSMENT/PLAN:    Patient Active Problem List   Diagnosis    GERD (gastroesophageal reflux disease)    Essential hypertension    Type 2 diabetes mellitus without complication, without long-term current use of insulin ([facility])    Familial hyperlipidemia    Irritable bowel syndrome with diarrhea    Dysfunction of both eustachian tubes    DUB (dysfunctional uterine bleeding)    Fatigue    Current mild episode of major depressive disorder without prior episode ([facility])    Migraine without aura and without status migrainosus, not intractable    Caffeine use disorder       Sebastian Bailey was seen today for dizziness and loss of consciousness. Diagnoses and all orders for this visit:    Current mild episode of major depressive disorder without prior episode ([facility] [address]    Type 2 diabetes mellitus without complication, without long-term current use of insulin ([facility])  -     metFORMIN (GLUCOPHAGE) 1000 MG tablet; TAKE 1 TABLET BY MOUTH  TWICE DAILY WITH MEALS    Gastroesophageal reflux disease without esophagitis    Vasovagal syncope  -     Shelby Galidno MD, Cardiology, [place]    Other orders  -     lisinopril (PRINIVIL;ZESTRIL) 20 MG tablet; TAKE 1 TABLET BY MOUTH  DAILY  -     venlafaxine (EFFEXOR XR) 150 MG extended release capsule; Take 1 capsule by mouth daily  -     rosuvastatin (CRESTOR) 5 MG tablet; TAKE 1 TABLET BY MOUTH AT  NIGHT  -     dexlansoprazole (DEXILANT) 60 MG CPDR delayed release capsule; TAKE 1 CAPSULE BY MOUTH EVERY DAY    Recommend follow up with Cardiology  Referral placed  Likely need Holter and Echo  [facility] ED records will be sent for and reviewed    Return if symptoms worsen or fail to improve. I spent 30 minutes with this patient. I spent greater than 50% of the time counseling this patient. Chris Plan, DO  2/28/2023  8:21 AM

## 2023-03-01 ENCOUNTER — TELEPHONE (OUTPATIENT)
Dept: ADMINISTRATIVE | Age: 48
End: 2023-03-01

## 2023-03-01 NOTE — TELEPHONE ENCOUNTER
Patient Appointment Form:      PCP: Maribeth Sethi  Referring: Maribeth Sethi    Has the Patient:    Seen a Cardiologist? no    Had a heart catheterization? no    Had heart surgery? no    Had a stress test or nuclear stress test? no    Had an echocardiogram? no    Had a vascular ultrasound? no    Had a 24/48 heart monitor or extended cardiac event monitor? no    Had recent blood work in the last 6 months? yes    date: recent    ordering physician: ER @ [facility]    Had a pacemaker/ICD/ILR implant? no    Seen an Electrophysiologist? no        Will send records via: in [address] [place] [address] Rd      Date & time of appointment:  [email or web address]

## 2023-03-02 ENCOUNTER — OFFICE VISIT (OUTPATIENT)
Dept: CARDIOLOGY CLINIC | Age: 48
End: 2023-03-02
Payer: COMMERCIAL

## 2023-03-02 VITALS
BODY MASS INDEX: 34.25 KG/M2 | HEART RATE: 129 BPM | DIASTOLIC BLOOD PRESSURE: 99 MMHG | SYSTOLIC BLOOD PRESSURE: 169 MMHG | HEIGHT: 67 IN | RESPIRATION RATE: 18 BRPM | WEIGHT: 218.2 LBS

## 2023-03-02 DIAGNOSIS — I10 ESSENTIAL HYPERTENSION: Primary | ICD-10-CM

## 2023-03-02 PROBLEM — R53.83 FATIGUE: Status: RESOLVED | Noted: 2020-10-29 | Resolved: 2023-03-02

## 2023-03-02 PROBLEM — F15.90 CAFFEINE USE DISORDER: Status: RESOLVED | Noted: 2022-09-23 | Resolved: 2023-03-02

## 2023-03-02 PROCEDURE — 93000 ELECTROCARDIOGRAM COMPLETE: CPT | Performed by: INTERNAL MEDICINE

## 2023-03-02 PROCEDURE — 3074F SYST BP LT 130 MM HG: CPT | Performed by: INTERNAL MEDICINE

## 2023-03-02 PROCEDURE — 99204 OFFICE O/P NEW MOD 45 MIN: CPT | Performed by: INTERNAL MEDICINE

## 2023-03-02 PROCEDURE — 3080F DIAST BP >= 90 MM HG: CPT | Performed by: INTERNAL MEDICINE

## 2023-03-02 NOTE — PROGRESS NOTES
Chief Complaint   Patient presents with    Heart Problem       Patient Active Problem List    Diagnosis Date Noted    Migraine without aura and without status migrainosus, not intractable 07/18/2022    Current mild episode of major depressive disorder without prior episode ([facility] 11/11/2021    DUB (dysfunctional uterine bleeding) 06/01/2018    Dysfunction of both eustachian tubes 04/24/2018    Irritable bowel syndrome with diarrhea 02/20/2017    Familial hyperlipidemia 06/25/2016    Essential hypertension     Type 2 diabetes mellitus without complication, without long-term current use of insulin ([facility])     GERD (gastroesophageal reflux disease)        Current Outpatient Medications   Medication Sig Dispense Refill    lisinopril (PRINIVIL;ZESTRIL) 20 MG tablet TAKE 1 TABLET BY MOUTH  DAILY 90 tablet 3    venlafaxine (EFFEXOR XR) 150 MG extended release capsule Take 1 capsule by mouth daily 90 capsule 3    rosuvastatin (CRESTOR) 5 MG tablet TAKE 1 TABLET BY MOUTH AT  NIGHT 90 tablet 3    metFORMIN (GLUCOPHAGE) 1000 MG tablet TAKE 1 TABLET BY MOUTH  TWICE DAILY WITH MEALS 180 tablet 3    dexlansoprazole (DEXILANT) 60 MG CPDR delayed release capsule TAKE 1 CAPSULE BY MOUTH EVERY DAY 90 capsule 3    butalbital-acetaminophen-caffeine (FIORICET, ESGIC) -40 MG per tablet Take 1 tablet by mouth every 6 hours as needed for Headaches or Migraine 90 tablet 3    buPROPion (WELLBUTRIN XL) 150 MG extended release tablet TAKE 1 TABLET BY MOUTH EVERY DAY IN THE MORNING 90 tablet 3    Rimegepant Sulfate 75 MG TBDP Take 75 mg by mouth every other day 48 tablet 3    topiramate (TOPAMAX) 25 MG tablet START 1 TAB TWICE DAILY X2 WEEKS, THEN 1 TAB IN THE MORNING & 2 TABS IN THE EVENING X2 WEEKS, THEN MAY INCREASE TO 2 TABS TWICE DAILY 124 tablet 5    doxycycline hyclate (VIBRAMYCIN) 100 MG capsule TAKE 1 CAPSULE BY MOUTH EVERY DAY WITH FOOD      insulin lispro (HUMALOG) 100 UNIT/ML SOLN injection vial INJECT SUBCUTANEOUSLY VIA  INSULIN PUMP MAX 75 UNITS  PER DAY 70 mL 5    VICTOZA 18 MG/3ML SOPN SC injection Inject 1.8 mg into the skin daily 9 pen 5    Insulin Pen Needle (BD PEN NEEDLE WEST U/F) 32G X 4 MM MISC Uses once per day 100 each 3    Insulin Infusion Pump (MINIMED 770G INSULIN PUMP SYS) KIT To infuse insulin 1 kit 0    albuterol sulfate  (90 Base) MCG/ACT inhaler Inhale 2 puffs into the lungs every 4 hours as needed for Wheezing 1 Inhaler 0    Insulin Pen Needle (BD ULTRA-FINE PEN NEEDLES) 29G X 12.7MM MISC 1 each by Does not apply route 2 times daily Bd ultrafine needles to use with victoza pen 200 each 3     No current facility-administered medications for this visit. No Known Allergies    Vitals:    03/02/23 1110 03/02/23 1113 03/02/23 1114   BP: (!) 117/93 (!) 133/106 (!) 169/99   Site: Right Upper Arm Right Upper Arm Right Upper Arm   Position: Supine Standing Standing   Pulse: (!) 106 (!) 125 (!) 129   Resp: 18     Weight: 218 lb 3.2 oz (99 kg)     Height: 5' 7\" (1.702 m)                   SUBJECTIVE: Padmaja Contreras presents to the office today for consult - dr Lelo Dodd. She complains of syncope and denies   dyspnea, exertional chest pressure/discomfort, fatigue, irregular heart beat, lower extremity edema, orthopnea, palpitations, paroxysmal nocturnal dyspnea, and tachypnea  Hx of DM and HTN on ACE. Isolated episode last week of feeling poorly at work, sensation of needing to defecate, walking to eat lunch, sitting down and then going out. Vague recollection of EMT commenting on low BP. [place] [facility] w/u neg - told her she had vertigo? Drinks caffeine, light alcohol, does some yoga. Physical Exam   BP (!) 169/99 (Site: Right Upper Arm, Position: Standing)   Pulse (!) 129   Resp 18   Ht 5' 7\" (1.702 m)   Wt 218 lb 3.2 oz (99 kg)   BMI 34.17 kg/m²   Constitutional: Oriented to person, place, and time. Obese No distress. Head: Normocephalic and atraumatic. Neck: No JVD present.  Carotid bruit is not present. Cardiovascular: Normal rate, regular rhythm, normal heart sounds and intact distal pulses. No gallop and no friction rub. No murmur heard. Pulmonary/Chest: Effort normal and breath sounds normal.   Abdominal: Soft. Bowel sounds are normal. No distension and no mass. Musculoskeletal: No edema   Neurological: Alert and oriented to person, place, and time. Skin: Skin is warm and dry. No rash noted. Psychiatric: Normal mood and affect. Behavior is normal.     EKG: sinus tachycardia, left axis deviation, unchanged from previous tracing of 2016. ASSESSMENT AND PLAN:  Patient Active Problem List   Diagnosis    GERD (gastroesophageal reflux disease)    Essential hypertension    Type 2 diabetes mellitus without complication, without long-term current use of insulin ([facility])    Familial hyperlipidemia    Irritable bowel syndrome with diarrhea    Dysfunction of both eustachian tubes    DUB (dysfunctional uterine bleeding)    Current mild episode of major depressive disorder without prior episode ([facility])    Migraine without aura and without status migrainosus, not intractable     Vasovagal episode by hx  Normal CV exam, functional capacity, echo in 2016 and ekg no change today  Orthostatic vitals neg for POTs or Orthostasis  Educated patient as to nature and mechanism of symptoms. Advised aggressive hydration throughout the day with water, and acute ingestion of additional water in bolus fashion to abort attacks  Eliminate or reduce caffeine and alcohol usage. Liberalize salt intake, consider compression stockings. Exercise to emphasize lower extremity strengthening.   Counterpressure maneuvers to abort attacks described    OV zuri Christianson M.D  [facility]

## 2023-04-05 DIAGNOSIS — E11.9 TYPE 2 DIABETES MELLITUS WITHOUT COMPLICATION, WITHOUT LONG-TERM CURRENT USE OF INSULIN (HCC): ICD-10-CM

## 2023-04-05 RX ORDER — PEN NEEDLE, DIABETIC 29 G X1/2"
1 NEEDLE, DISPOSABLE MISCELLANEOUS 2 TIMES DAILY
Qty: 200 EACH | Refills: 3 | Status: SHIPPED | OUTPATIENT
Start: 2023-04-05

## 2023-04-06 RX ORDER — LIRAGLUTIDE 6 MG/ML
1.8 INJECTION SUBCUTANEOUS DAILY
Qty: 9 ADJUSTABLE DOSE PRE-FILLED PEN SYRINGE | Refills: 3 | Status: SHIPPED | OUTPATIENT
Start: 2023-04-06

## 2023-04-06 RX ORDER — INSULIN LISPRO 100 [IU]/ML
INJECTION, SOLUTION INTRAVENOUS; SUBCUTANEOUS
Qty: 70 ML | Refills: 5 | Status: SHIPPED | OUTPATIENT
Start: 2023-04-06

## 2023-04-26 ENCOUNTER — OFFICE VISIT (OUTPATIENT)
Dept: ENDOCRINOLOGY | Age: 48
End: 2023-04-26
Payer: COMMERCIAL

## 2023-04-26 VITALS
HEIGHT: 67 IN | SYSTOLIC BLOOD PRESSURE: 122 MMHG | OXYGEN SATURATION: 96 % | DIASTOLIC BLOOD PRESSURE: 87 MMHG | WEIGHT: 220 LBS | BODY MASS INDEX: 34.53 KG/M2 | HEART RATE: 83 BPM

## 2023-04-26 DIAGNOSIS — E78.5 HYPERLIPIDEMIA, UNSPECIFIED HYPERLIPIDEMIA TYPE: ICD-10-CM

## 2023-04-26 DIAGNOSIS — E66.09 CLASS 1 OBESITY DUE TO EXCESS CALORIES WITHOUT SERIOUS COMORBIDITY WITH BODY MASS INDEX (BMI) OF 34.0 TO 34.9 IN ADULT: ICD-10-CM

## 2023-04-26 DIAGNOSIS — R80.9 TYPE 2 DIABETES MELLITUS WITH MICROALBUMINURIA, WITH LONG-TERM CURRENT USE OF INSULIN (HCC): Primary | ICD-10-CM

## 2023-04-26 DIAGNOSIS — E55.9 VITAMIN D DEFICIENCY: ICD-10-CM

## 2023-04-26 DIAGNOSIS — Z79.4 TYPE 2 DIABETES MELLITUS WITH MICROALBUMINURIA, WITH LONG-TERM CURRENT USE OF INSULIN (HCC): Primary | ICD-10-CM

## 2023-04-26 DIAGNOSIS — E11.29 TYPE 2 DIABETES MELLITUS WITH MICROALBUMINURIA, WITH LONG-TERM CURRENT USE OF INSULIN (HCC): Primary | ICD-10-CM

## 2023-04-26 LAB — HBA1C MFR BLD: 7.3 %

## 2023-04-26 PROCEDURE — 83036 HEMOGLOBIN GLYCOSYLATED A1C: CPT | Performed by: CLINICAL NURSE SPECIALIST

## 2023-04-26 PROCEDURE — 99214 OFFICE O/P EST MOD 30 MIN: CPT | Performed by: CLINICAL NURSE SPECIALIST

## 2023-04-26 PROCEDURE — 3051F HG A1C>EQUAL 7.0%<8.0%: CPT | Performed by: CLINICAL NURSE SPECIALIST

## 2023-04-26 PROCEDURE — 3079F DIAST BP 80-89 MM HG: CPT | Performed by: CLINICAL NURSE SPECIALIST

## 2023-04-26 PROCEDURE — 3074F SYST BP LT 130 MM HG: CPT | Performed by: CLINICAL NURSE SPECIALIST

## 2023-04-26 PROCEDURE — 95251 CONT GLUC MNTR ANALYSIS I&R: CPT | Performed by: CLINICAL NURSE SPECIALIST

## 2023-04-26 NOTE — PROGRESS NOTES
[address] [place] [address] Department of Endocrinology Diabetes and Metabolism   [address] [facility] [address]   Phone: [phone]  Fax: [phone]    Date of Service: 4/26/2023    Primary Care Physician: Eda Perez DO  Referring physician: No ref. provider found  Provider: Alejo nAthony APRN - CNS     Reason for the visit:  Type 2 DM     History of Present Illness: The history is provided by the patient. No  was used. Accuracy of the patient data is excellent. Estella Lerma is a very pleasant 57 y.o. female seen today for diabetes management     Estella Lerma was diagnosed with diabetes early 35s.  Started as gestational diabetes and she is currently on 770g Medtronic insulin pump with CGM   On Metformin 1000 mg BID   Victoza 1.8 mg daily   Current pump settings: Basal rate 0.7, CR 12a 11, 10a 12, 1p 12, ISF 56, goal 100-150, active insulin time 3 hrs   Ambulatory continuous glucose monitoring of interstitial tissue fluid via a subcutaneous sensor for a minimum of 72 hours; analysis, interpretation and report  Average sensor glucose 141  Time in range 89%  Hyperglycemia 11%  Hypoglycemia 0%    Lab Results   Component Value Date/Time    LABA1C 7.3 04/26/2023 08:19 AM    LABA1C 6.4 10/12/2022 08:08 AM    LABA1C 7.2 06/27/2022 08:04 AM     Patient has had no hypoglycemic episodes   Very good with following diabetes diet and encouraged   I reviewed current medications and the patient has no issues with diabetes medications  Estella Lerma is up to date with eye exam and denied any history of diabetic retinopathy, retinopathy   The patient performs her own feet care and doesn't see podiatry   Microvascular complications:  No Retinopathy, Nephropathy or Neuropathy   Macrovascular complications: no CAD, PVD, or Stroke  On statin      PAST MEDICAL HISTORY   Past Medical History:   Diagnosis Date    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

## 2023-06-22 ENCOUNTER — OFFICE VISIT (OUTPATIENT)
Dept: NEUROLOGY | Age: 48
End: 2023-06-22
Payer: COMMERCIAL

## 2023-06-22 VITALS
RESPIRATION RATE: 16 BRPM | TEMPERATURE: 97.9 F | SYSTOLIC BLOOD PRESSURE: 158 MMHG | WEIGHT: 220 LBS | OXYGEN SATURATION: 100 % | DIASTOLIC BLOOD PRESSURE: 104 MMHG | BODY MASS INDEX: 34.53 KG/M2 | HEIGHT: 67 IN | HEART RATE: 86 BPM

## 2023-06-22 DIAGNOSIS — M54.81 BILATERAL OCCIPITAL NEURALGIA: Primary | ICD-10-CM

## 2023-06-22 DIAGNOSIS — G43.119 INTRACTABLE MIGRAINE WITH AURA WITHOUT STATUS MIGRAINOSUS: ICD-10-CM

## 2023-06-22 PROCEDURE — 99214 OFFICE O/P EST MOD 30 MIN: CPT | Performed by: NURSE PRACTITIONER

## 2023-06-22 PROCEDURE — 3080F DIAST BP >= 90 MM HG: CPT | Performed by: NURSE PRACTITIONER

## 2023-06-22 PROCEDURE — 3077F SYST BP >= 140 MM HG: CPT | Performed by: NURSE PRACTITIONER

## 2023-06-22 RX ORDER — BUTALBITAL, ACETAMINOPHEN AND CAFFEINE 50; 325; 40 MG/1; MG/1; MG/1
1 TABLET ORAL EVERY 6 HOURS PRN
Qty: 90 TABLET | Refills: 3 | Status: SHIPPED | OUTPATIENT
Start: 2023-06-22

## 2023-06-22 RX ORDER — TOPIRAMATE 25 MG/1
50 TABLET ORAL 2 TIMES DAILY
Qty: 120 TABLET | Refills: 11 | Status: SHIPPED | OUTPATIENT
Start: 2023-06-22

## 2023-06-29 ENCOUNTER — OFFICE VISIT (OUTPATIENT)
Dept: PAIN MANAGEMENT | Age: 48
End: 2023-06-29
Payer: COMMERCIAL

## 2023-06-29 VITALS
SYSTOLIC BLOOD PRESSURE: 132 MMHG | HEART RATE: 92 BPM | BODY MASS INDEX: 32.96 KG/M2 | OXYGEN SATURATION: 99 % | WEIGHT: 210 LBS | DIASTOLIC BLOOD PRESSURE: 84 MMHG | TEMPERATURE: 98.2 F | HEIGHT: 67 IN | RESPIRATION RATE: 16 BRPM

## 2023-06-29 DIAGNOSIS — G43.009 MIGRAINE WITHOUT AURA AND WITHOUT STATUS MIGRAINOSUS, NOT INTRACTABLE: Primary | ICD-10-CM

## 2023-06-29 DIAGNOSIS — M54.81 BILATERAL OCCIPITAL NEURALGIA: ICD-10-CM

## 2023-06-29 DIAGNOSIS — G89.29 CHRONIC NECK PAIN: ICD-10-CM

## 2023-06-29 DIAGNOSIS — M54.2 CHRONIC NECK PAIN: ICD-10-CM

## 2023-06-29 PROCEDURE — 3079F DIAST BP 80-89 MM HG: CPT | Performed by: STUDENT IN AN ORGANIZED HEALTH CARE EDUCATION/TRAINING PROGRAM

## 2023-06-29 PROCEDURE — 99204 OFFICE O/P NEW MOD 45 MIN: CPT | Performed by: STUDENT IN AN ORGANIZED HEALTH CARE EDUCATION/TRAINING PROGRAM

## 2023-06-29 PROCEDURE — 3075F SYST BP GE 130 - 139MM HG: CPT | Performed by: STUDENT IN AN ORGANIZED HEALTH CARE EDUCATION/TRAINING PROGRAM

## 2023-07-21 DIAGNOSIS — G43.119 INTRACTABLE MIGRAINE WITH AURA WITHOUT STATUS MIGRAINOSUS: ICD-10-CM

## 2023-07-21 RX ORDER — TOPIRAMATE 50 MG/1
50 TABLET, FILM COATED ORAL 2 TIMES DAILY
Qty: 60 TABLET | Refills: 5 | Status: SHIPPED | OUTPATIENT
Start: 2023-07-21

## 2023-11-09 DIAGNOSIS — G43.119 INTRACTABLE MIGRAINE WITH AURA WITHOUT STATUS MIGRAINOSUS: ICD-10-CM

## 2023-12-04 DIAGNOSIS — E55.9 VITAMIN D DEFICIENCY: ICD-10-CM

## 2023-12-04 DIAGNOSIS — Z79.4 TYPE 2 DIABETES MELLITUS WITH MICROALBUMINURIA, WITH LONG-TERM CURRENT USE OF INSULIN (HCC): Primary | ICD-10-CM

## 2023-12-04 DIAGNOSIS — E11.29 TYPE 2 DIABETES MELLITUS WITH MICROALBUMINURIA, WITH LONG-TERM CURRENT USE OF INSULIN (HCC): Primary | ICD-10-CM

## 2023-12-04 DIAGNOSIS — R80.9 TYPE 2 DIABETES MELLITUS WITH MICROALBUMINURIA, WITH LONG-TERM CURRENT USE OF INSULIN (HCC): Primary | ICD-10-CM

## 2023-12-07 DIAGNOSIS — E55.9 VITAMIN D DEFICIENCY: ICD-10-CM

## 2023-12-07 DIAGNOSIS — R80.9 TYPE 2 DIABETES MELLITUS WITH MICROALBUMINURIA, WITH LONG-TERM CURRENT USE OF INSULIN (HCC): ICD-10-CM

## 2023-12-07 DIAGNOSIS — E11.29 TYPE 2 DIABETES MELLITUS WITH MICROALBUMINURIA, WITH LONG-TERM CURRENT USE OF INSULIN (HCC): ICD-10-CM

## 2023-12-07 DIAGNOSIS — Z79.4 TYPE 2 DIABETES MELLITUS WITH MICROALBUMINURIA, WITH LONG-TERM CURRENT USE OF INSULIN (HCC): ICD-10-CM

## 2023-12-07 LAB
ALBUMIN SERPL-MCNC: 4.2 G/DL (ref 3.5–5.2)
ALP BLD-CCNC: 70 U/L (ref 35–104)
ALT SERPL-CCNC: 15 U/L (ref 0–32)
ANION GAP SERPL CALCULATED.3IONS-SCNC: 17 MMOL/L (ref 7–16)
AST SERPL-CCNC: 19 U/L (ref 0–31)
BILIRUB SERPL-MCNC: <0.2 MG/DL (ref 0–1.2)
BUN BLDV-MCNC: 13 MG/DL (ref 6–20)
CALCIUM SERPL-MCNC: 9.5 MG/DL (ref 8.6–10.2)
CHLORIDE BLD-SCNC: 103 MMOL/L (ref 98–107)
CHOLESTEROL: 126 MG/DL
CO2: 18 MMOL/L (ref 22–29)
CREAT SERPL-MCNC: 0.7 MG/DL (ref 0.5–1)
CREATININE URINE: 23.6 MG/DL (ref 29–226)
GFR SERPL CREATININE-BSD FRML MDRD: >60 ML/MIN/1.73M2
GLUCOSE BLD-MCNC: 105 MG/DL (ref 74–99)
HDLC SERPL-MCNC: 41 MG/DL
LDL CHOLESTEROL: 63 MG/DL
MICROALBUMIN/CREAT 24H UR: <12 MG/L (ref 0–19)
MICROALBUMIN/CREAT UR-RTO: ABNORMAL MCG/MG CREAT (ref 0–30)
POTASSIUM SERPL-SCNC: 4.4 MMOL/L (ref 3.5–5)
SODIUM BLD-SCNC: 138 MMOL/L (ref 132–146)
TOTAL PROTEIN: 7.1 G/DL (ref 6.4–8.3)
TRIGL SERPL-MCNC: 108 MG/DL
TSH SERPL DL<=0.05 MIU/L-ACNC: 0.57 UIU/ML (ref 0.27–4.2)
VITAMIN D 25-HYDROXY: 50.7 NG/ML (ref 30–100)
VLDLC SERPL CALC-MCNC: 22 MG/DL

## 2024-01-09 DIAGNOSIS — G43.119 INTRACTABLE MIGRAINE WITH AURA WITHOUT STATUS MIGRAINOSUS: ICD-10-CM

## 2024-01-16 ENCOUNTER — OFFICE VISIT (OUTPATIENT)
Dept: NEUROLOGY | Age: 49
End: 2024-01-16
Payer: COMMERCIAL

## 2024-01-16 VITALS
RESPIRATION RATE: 18 BRPM | SYSTOLIC BLOOD PRESSURE: 139 MMHG | BODY MASS INDEX: 34.06 KG/M2 | HEIGHT: 67 IN | TEMPERATURE: 98.2 F | WEIGHT: 217 LBS | HEART RATE: 89 BPM | OXYGEN SATURATION: 100 % | DIASTOLIC BLOOD PRESSURE: 89 MMHG

## 2024-01-16 DIAGNOSIS — M54.81 BILATERAL OCCIPITAL NEURALGIA: Primary | ICD-10-CM

## 2024-01-16 DIAGNOSIS — G43.119 INTRACTABLE MIGRAINE WITH AURA WITHOUT STATUS MIGRAINOSUS: ICD-10-CM

## 2024-01-16 PROCEDURE — 3079F DIAST BP 80-89 MM HG: CPT | Performed by: NURSE PRACTITIONER

## 2024-01-16 PROCEDURE — 99214 OFFICE O/P EST MOD 30 MIN: CPT | Performed by: NURSE PRACTITIONER

## 2024-01-16 PROCEDURE — 3075F SYST BP GE 130 - 139MM HG: CPT | Performed by: NURSE PRACTITIONER

## 2024-01-16 RX ORDER — BUTALBITAL, ACETAMINOPHEN AND CAFFEINE 50; 325; 40 MG/1; MG/1; MG/1
1 TABLET ORAL EVERY 6 HOURS PRN
Qty: 90 TABLET | Refills: 3 | Status: SHIPPED | OUTPATIENT
Start: 2024-01-16

## 2024-01-16 RX ORDER — TOPIRAMATE 50 MG/1
50 TABLET, FILM COATED ORAL 2 TIMES DAILY
Qty: 60 TABLET | Refills: 5 | Status: SHIPPED | OUTPATIENT
Start: 2024-01-16

## 2024-01-16 RX ORDER — RIMEGEPANT SULFATE 75 MG/75MG
75 TABLET, ORALLY DISINTEGRATING ORAL EVERY OTHER DAY
Qty: 8 TABLET | Refills: 0 | COMMUNITY
Start: 2024-01-16

## 2024-01-16 NOTE — PROGRESS NOTES
[facility]  NEUROLOGY  Sincere Christopher Jr., M.D., F.A.C.P.  Sebastian Alvarez, DNP, APRN, ACNS-BC  Iraj Nick, MSN, APRN-FNP-C  Angelica Gomez, MSN, APRN-FNP-C  Danna Loredo, MSPAS, PA-C  Loren Carroll, MSN, APRN-FNP-C  [facility], [address] [place], [place] [address]  Phone: [phone]  Fax: [phone]         Baiele Andrews is a 48 y.o. right handed female     Patient follows for migraine headaches    Previously followed with Dr. Gupta    Currently on Topamax 50 mg twice daily for preventative migraine treatment and Nurtec 75 mg every other day, uses Fioricet as abortive therapy.  Had a recent CT head done at [facility] which did not show any acute findings only small vessel disease.    Taking Topamax, Nurtec and Fioricet which have been working well for chronic migraine relief.  She has complaints of bilateral pain to the back of her head which radiates up towards the front. We referred her to pain management for occipital nerve blocks but her insurance had her do PT for her neck and she notes no relief from this therapy.  She notes insurance would not pay for her Nurtec as she was taking it every other day.      Description of Headaches:  Location of pain: right-sided unilateral, bilateral, occipital, retro-orbital  Radiation of pain?:right-sided unilateral  Character of pain:sharp, shooting, and throbbing  Severity of pain: 10  Accompanying symptoms: nausea, sonophobia, photophobia  Prodromal sx?: scotomata  --- include increased yawning, euphoria, depression, irritability, food cravings, constipation, and neck stiffness.  Rapidity of onset:   Typical duration of individual headache: 4 hours  Frequency of headaches: > 15 headaches/monthly  Are most headaches similar in presentation? yes  Typical precipitants: stress  --- stress, menstruation, visual stimuli, weather changes, nitrates, fasting, and wine were probable migraine trigger factors, while sleep disturbances

## 2024-02-19 RX ORDER — LISINOPRIL 20 MG/1
TABLET ORAL
Qty: 90 TABLET | Refills: 3 | Status: SHIPPED | OUTPATIENT
Start: 2024-02-19

## 2024-02-19 RX ORDER — ROSUVASTATIN CALCIUM 5 MG/1
TABLET, COATED ORAL
Qty: 90 TABLET | Refills: 3 | Status: SHIPPED | OUTPATIENT
Start: 2024-02-19

## 2024-02-19 RX ORDER — BUPROPION HYDROCHLORIDE 150 MG/1
TABLET ORAL
Qty: 90 TABLET | Refills: 3 | Status: SHIPPED | OUTPATIENT
Start: 2024-02-19

## 2024-02-19 RX ORDER — DEXLANSOPRAZOLE 60 MG/1
CAPSULE, DELAYED RELEASE ORAL
Qty: 90 CAPSULE | Refills: 3 | Status: SHIPPED | OUTPATIENT
Start: 2024-02-19

## 2024-02-21 ENCOUNTER — OFFICE VISIT (OUTPATIENT)
Dept: ENDOCRINOLOGY | Age: 49
End: 2024-02-21
Payer: COMMERCIAL

## 2024-02-21 VITALS
BODY MASS INDEX: 32.8 KG/M2 | DIASTOLIC BLOOD PRESSURE: 70 MMHG | OXYGEN SATURATION: 95 % | SYSTOLIC BLOOD PRESSURE: 112 MMHG | WEIGHT: 209 LBS | HEIGHT: 67 IN | HEART RATE: 68 BPM

## 2024-02-21 DIAGNOSIS — E78.5 HYPERLIPIDEMIA, UNSPECIFIED HYPERLIPIDEMIA TYPE: ICD-10-CM

## 2024-02-21 DIAGNOSIS — E66.09 CLASS 1 OBESITY DUE TO EXCESS CALORIES WITHOUT SERIOUS COMORBIDITY WITH BODY MASS INDEX (BMI) OF 34.0 TO 34.9 IN ADULT: ICD-10-CM

## 2024-02-21 DIAGNOSIS — E11.29 TYPE 2 DIABETES MELLITUS WITH MICROALBUMINURIA, WITH LONG-TERM CURRENT USE OF INSULIN (HCC): Primary | ICD-10-CM

## 2024-02-21 DIAGNOSIS — E11.9 TYPE 2 DIABETES MELLITUS WITHOUT COMPLICATION, WITHOUT LONG-TERM CURRENT USE OF INSULIN (HCC): ICD-10-CM

## 2024-02-21 DIAGNOSIS — Z79.4 TYPE 2 DIABETES MELLITUS WITH MICROALBUMINURIA, WITH LONG-TERM CURRENT USE OF INSULIN (HCC): Primary | ICD-10-CM

## 2024-02-21 DIAGNOSIS — R80.9 TYPE 2 DIABETES MELLITUS WITH MICROALBUMINURIA, WITH LONG-TERM CURRENT USE OF INSULIN (HCC): Primary | ICD-10-CM

## 2024-02-21 DIAGNOSIS — E55.9 VITAMIN D DEFICIENCY: ICD-10-CM

## 2024-02-21 LAB — HBA1C MFR BLD: 6.5 %

## 2024-02-21 PROCEDURE — 3074F SYST BP LT 130 MM HG: CPT | Performed by: FAMILY MEDICINE

## 2024-02-21 PROCEDURE — 99214 OFFICE O/P EST MOD 30 MIN: CPT | Performed by: FAMILY MEDICINE

## 2024-02-21 PROCEDURE — 95251 CONT GLUC MNTR ANALYSIS I&R: CPT | Performed by: FAMILY MEDICINE

## 2024-02-21 PROCEDURE — 3078F DIAST BP <80 MM HG: CPT | Performed by: FAMILY MEDICINE

## 2024-02-21 PROCEDURE — 83036 HEMOGLOBIN GLYCOSYLATED A1C: CPT | Performed by: FAMILY MEDICINE

## 2024-02-21 RX ORDER — INSULIN LISPRO 100 [IU]/ML
INJECTION, SOLUTION INTRAVENOUS; SUBCUTANEOUS
Qty: 70 ML | Refills: 5 | Status: SHIPPED | OUTPATIENT
Start: 2024-02-21

## 2024-02-21 RX ORDER — LIRAGLUTIDE 6 MG/ML
1.8 INJECTION SUBCUTANEOUS DAILY
Qty: 9 ADJUSTABLE DOSE PRE-FILLED PEN SYRINGE | Refills: 3 | Status: SHIPPED | OUTPATIENT
Start: 2024-02-21

## 2024-02-21 NOTE — PROGRESS NOTES
[facility] Department of Endocrinology Diabetes and Metabolism   [address] [facility] [place] [address]    Phone: [phone]  Fax: [phone]    Date of Service: 2/21/2024    Primary Care Physician: Ellis Fisher DO  Referring physician: No ref. provider found  Provider: TONI Remy - CNP     Reason for the visit:  Type 2 DM     History of Present Illness:  The history is provided by the patient. No  was used. Accuracy of the patient data is excellent.  Bailee Andrews is a very pleasant 48 y.o. female seen today for diabetes management     Bailee Andrews was diagnosed with diabetes early 30s. Started as gestational diabetes and she is currently on metformin 1000 mg twice daily, Victoza 1.8 mg daily, and 780g Medtronic insulin pump with CGM     Current pump settings: Basal rate 0.7, CR 12a 11, 10a 12, 1p 12, ISF 56, goal 100-150, active insulin time 3 hrs, smart guard target 100    TIR 97%  Hyperglycemia 2%  Hypoglycemia 1%  Avg glucose 126  GMI 6.3%    Has lost almost 70lbs since Mid October. Not trying. Fatigue.  Good appetite.  No medication changes.  TFTs WNL.    Lab Results   Component Value Date/Time    LABA1C 7.3 04/26/2023 08:19 AM    LABA1C 6.4 10/12/2022 08:08 AM    LABA1C 7.2 06/27/2022 08:04 AM     Patient has had hypoglycemic episodes throughout the afternoon and evening that are problematic for her.  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

## 2024-02-29 ASSESSMENT — PATIENT HEALTH QUESTIONNAIRE - PHQ9
9. THOUGHTS THAT YOU WOULD BE BETTER OFF DEAD, OR OF HURTING YOURSELF: 0
10. IF YOU CHECKED OFF ANY PROBLEMS, HOW DIFFICULT HAVE THESE PROBLEMS MADE IT FOR YOU TO DO YOUR WORK, TAKE CARE OF THINGS AT HOME, OR GET ALONG WITH OTHER PEOPLE: 0
7. TROUBLE CONCENTRATING ON THINGS, SUCH AS READING THE NEWSPAPER OR WATCHING TELEVISION: NOT AT ALL
6. FEELING BAD ABOUT YOURSELF - OR THAT YOU ARE A FAILURE OR HAVE LET YOURSELF OR YOUR FAMILY DOWN: NOT AT ALL
5. POOR APPETITE OR OVEREATING: NOT AT ALL
4. FEELING TIRED OR HAVING LITTLE ENERGY: 3
9. THOUGHTS THAT YOU WOULD BE BETTER OFF DEAD, OR OF HURTING YOURSELF: NOT AT ALL
1. LITTLE INTEREST OR PLEASURE IN DOING THINGS: NOT AT ALL
3. TROUBLE FALLING OR STAYING ASLEEP: NOT AT ALL
2. FEELING DOWN, DEPRESSED OR HOPELESS: NOT AT ALL
SUM OF ALL RESPONSES TO PHQ QUESTIONS 1-9: 3
2. FEELING DOWN, DEPRESSED OR HOPELESS: 0
SUM OF ALL RESPONSES TO PHQ QUESTIONS 1-9: 3
10. IF YOU CHECKED OFF ANY PROBLEMS, HOW DIFFICULT HAVE THESE PROBLEMS MADE IT FOR YOU TO DO YOUR WORK, TAKE CARE OF THINGS AT HOME, OR GET ALONG WITH OTHER PEOPLE: NOT DIFFICULT AT ALL
SUM OF ALL RESPONSES TO PHQ9 QUESTIONS 1 & 2: 0
SUM OF ALL RESPONSES TO PHQ QUESTIONS 1-9: 3
5. POOR APPETITE OR OVEREATING: 0
4. FEELING TIRED OR HAVING LITTLE ENERGY: NEARLY EVERY DAY
6. FEELING BAD ABOUT YOURSELF - OR THAT YOU ARE A FAILURE OR HAVE LET YOURSELF OR YOUR FAMILY DOWN: 0
7. TROUBLE CONCENTRATING ON THINGS, SUCH AS READING THE NEWSPAPER OR WATCHING TELEVISION: 0
SUM OF ALL RESPONSES TO PHQ QUESTIONS 1-9: 3
8. MOVING OR SPEAKING SO SLOWLY THAT OTHER PEOPLE COULD HAVE NOTICED. OR THE OPPOSITE, BEING SO FIGETY OR RESTLESS THAT YOU HAVE BEEN MOVING AROUND A LOT MORE THAN USUAL: 0
8. MOVING OR SPEAKING SO SLOWLY THAT OTHER PEOPLE COULD HAVE NOTICED. OR THE OPPOSITE - BEING SO FIDGETY OR RESTLESS THAT YOU HAVE BEEN MOVING AROUND A LOT MORE THAN USUAL: NOT AT ALL
1. LITTLE INTEREST OR PLEASURE IN DOING THINGS: 0
SUM OF ALL RESPONSES TO PHQ QUESTIONS 1-9: 3
3. TROUBLE FALLING OR STAYING ASLEEP: 0

## 2024-03-01 ENCOUNTER — OFFICE VISIT (OUTPATIENT)
Dept: PRIMARY CARE CLINIC | Age: 49
End: 2024-03-01
Payer: COMMERCIAL

## 2024-03-01 VITALS
BODY MASS INDEX: 32.96 KG/M2 | HEART RATE: 71 BPM | HEIGHT: 67 IN | WEIGHT: 210 LBS | OXYGEN SATURATION: 99 % | TEMPERATURE: 97.7 F | DIASTOLIC BLOOD PRESSURE: 76 MMHG | SYSTOLIC BLOOD PRESSURE: 126 MMHG

## 2024-03-01 DIAGNOSIS — R53.83 FATIGUE, UNSPECIFIED TYPE: ICD-10-CM

## 2024-03-01 DIAGNOSIS — N93.8 DUB (DYSFUNCTIONAL UTERINE BLEEDING): ICD-10-CM

## 2024-03-01 DIAGNOSIS — E11.9 TYPE 2 DIABETES MELLITUS WITHOUT COMPLICATION, WITHOUT LONG-TERM CURRENT USE OF INSULIN (HCC): ICD-10-CM

## 2024-03-01 DIAGNOSIS — Z00.01 ENCOUNTER FOR WELL ADULT EXAM WITH ABNORMAL FINDINGS: Primary | ICD-10-CM

## 2024-03-01 DIAGNOSIS — I10 ESSENTIAL HYPERTENSION: ICD-10-CM

## 2024-03-01 DIAGNOSIS — K21.9 GASTROESOPHAGEAL REFLUX DISEASE WITHOUT ESOPHAGITIS: ICD-10-CM

## 2024-03-01 DIAGNOSIS — R63.4 RECENT UNEXPLAINED WEIGHT LOSS: ICD-10-CM

## 2024-03-01 DIAGNOSIS — F32.0 CURRENT MILD EPISODE OF MAJOR DEPRESSIVE DISORDER WITHOUT PRIOR EPISODE (HCC): ICD-10-CM

## 2024-03-01 DIAGNOSIS — Z12.31 SCREENING MAMMOGRAM FOR BREAST CANCER: ICD-10-CM

## 2024-03-01 DIAGNOSIS — E78.49 FAMILIAL HYPERLIPIDEMIA: Chronic | ICD-10-CM

## 2024-03-01 LAB
ABSOLUTE IMMATURE GRANULOCYTE: <0.03 K/UL (ref 0–0.58)
ALBUMIN SERPL-MCNC: 4.6 G/DL (ref 3.5–5.2)
ALP BLD-CCNC: 66 U/L (ref 35–104)
ALT SERPL-CCNC: 11 U/L (ref 0–32)
ANION GAP SERPL CALCULATED.3IONS-SCNC: 13 MMOL/L (ref 7–16)
AST SERPL-CCNC: 14 U/L (ref 0–31)
BASOPHILS ABSOLUTE: 0.06 K/UL (ref 0–0.2)
BASOPHILS RELATIVE PERCENT: 1 % (ref 0–2)
BILIRUB SERPL-MCNC: 0.2 MG/DL (ref 0–1.2)
BUN BLDV-MCNC: 11 MG/DL (ref 6–20)
CALCIUM SERPL-MCNC: 9.4 MG/DL (ref 8.6–10.2)
CHLORIDE BLD-SCNC: 104 MMOL/L (ref 98–107)
CO2: 23 MMOL/L (ref 22–29)
CREAT SERPL-MCNC: 0.7 MG/DL (ref 0.5–1)
EOSINOPHILS ABSOLUTE: 0.12 K/UL (ref 0.05–0.5)
EOSINOPHILS RELATIVE PERCENT: 1 % (ref 0–6)
GFR SERPL CREATININE-BSD FRML MDRD: >60 ML/MIN/1.73M2
GLUCOSE BLD-MCNC: 86 MG/DL (ref 74–99)
HCT VFR BLD CALC: 37.8 % (ref 34–48)
HEMOGLOBIN: 11 G/DL (ref 11.5–15.5)
IMMATURE GRANULOCYTES: 0 % (ref 0–5)
LYMPHOCYTES ABSOLUTE: 2.98 K/UL (ref 1.5–4)
LYMPHOCYTES RELATIVE PERCENT: 32 % (ref 20–42)
MCH RBC QN AUTO: 22.6 PG (ref 26–35)
MCHC RBC AUTO-ENTMCNC: 29.1 G/DL (ref 32–34.5)
MCV RBC AUTO: 77.8 FL (ref 80–99.9)
MONOCYTES ABSOLUTE: 0.64 K/UL (ref 0.1–0.95)
MONOCYTES RELATIVE PERCENT: 7 % (ref 2–12)
NEUTROPHILS ABSOLUTE: 5.55 K/UL (ref 1.8–7.3)
NEUTROPHILS RELATIVE PERCENT: 59 % (ref 43–80)
PDW BLD-RTO: 20 % (ref 11.5–15)
PLATELET # BLD: 427 K/UL (ref 130–450)
PMV BLD AUTO: 12.3 FL (ref 7–12)
POTASSIUM SERPL-SCNC: 4.3 MMOL/L (ref 3.5–5)
PREALBUMIN: 22 MG/DL (ref 20–40)
RBC # BLD: 4.86 M/UL (ref 3.5–5.5)
SODIUM BLD-SCNC: 140 MMOL/L (ref 132–146)
T4 FREE: 1.3 NG/DL (ref 0.9–1.7)
TOTAL PROTEIN: 7.5 G/DL (ref 6.4–8.3)
TSH SERPL DL<=0.05 MIU/L-ACNC: 0.64 UIU/ML (ref 0.27–4.2)
WBC # BLD: 9.4 K/UL (ref 4.5–11.5)

## 2024-03-01 PROCEDURE — 99396 PREV VISIT EST AGE 40-64: CPT | Performed by: FAMILY MEDICINE

## 2024-03-01 SDOH — ECONOMIC STABILITY: INCOME INSECURITY: HOW HARD IS IT FOR YOU TO PAY FOR THE VERY BASICS LIKE FOOD, HOUSING, MEDICAL CARE, AND HEATING?: NOT VERY HARD

## 2024-03-01 SDOH — ECONOMIC STABILITY: FOOD INSECURITY: WITHIN THE PAST 12 MONTHS, YOU WORRIED THAT YOUR FOOD WOULD RUN OUT BEFORE YOU GOT MONEY TO BUY MORE.: NEVER TRUE

## 2024-03-01 SDOH — ECONOMIC STABILITY: HOUSING INSECURITY
IN THE LAST 12 MONTHS, WAS THERE A TIME WHEN YOU DID NOT HAVE A STEADY PLACE TO SLEEP OR SLEPT IN A SHELTER (INCLUDING NOW)?: NO

## 2024-03-01 SDOH — ECONOMIC STABILITY: FOOD INSECURITY: WITHIN THE PAST 12 MONTHS, THE FOOD YOU BOUGHT JUST DIDN'T LAST AND YOU DIDN'T HAVE MONEY TO GET MORE.: NEVER TRUE

## 2024-03-01 ASSESSMENT — ENCOUNTER SYMPTOMS
APNEA: 0
WHEEZING: 0
COLOR CHANGE: 0
CONSTIPATION: 0
CHEST TIGHTNESS: 0
ABDOMINAL PAIN: 0
VOMITING: 0
COUGH: 0
SORE THROAT: 0
DIARRHEA: 0
SHORTNESS OF BREATH: 0
RHINORRHEA: 0
EYE REDNESS: 0
SINUS PRESSURE: 0
BACK PAIN: 0
NAUSEA: 0
EYE PAIN: 0
BLOOD IN STOOL: 0
EYE ITCHING: 0

## 2024-03-01 NOTE — PROGRESS NOTES
type  -     CBC with Auto Differential; Future  -     Comprehensive Metabolic Panel; Future  -     TSH; Future  -     T4, Free; Future  6. Type 2 diabetes mellitus without complication, without long-term current use of insulin (HCC)  7. Essential hypertension  8. Familial hyperlipidemia  9. Current mild episode of major depressive disorder without prior episode (HCC)  10. Screening mammogram for breast cancer  -     CLEMENT DIGITAL SCREEN W OR WO CAD BILATERAL; Future         Personalized Preventive Plan   Current Health Maintenance Status  Immunization History   Administered Date(s) Administered    Influenza A (M1F1-81) Vaccine PF IM 10/28/2009        Health Maintenance   Topic Date Due    Hepatitis B vaccine (1 of 3 - 3-dose series) Never done    COVID-19 Vaccine (1) Never done    Pneumococcal 0-64 years Vaccine (1 - PCV) Never done    Diabetic foot exam  Never done    HIV screen  Never done    Hepatitis C screen  Never done    DTaP/Tdap/Td vaccine (1 - Tdap) Never done    Cervical cancer screen  06/08/2021    Diabetic retinal exam  02/10/2023    Flu vaccine (1) 08/01/2023    Diabetic Alb to Cr ratio (uACR) test  12/07/2024    Lipids  12/07/2024    GFR test (Diabetes, CKD 3-4, OR last GFR 15-59)  12/07/2024    A1C test (Diabetic or Prediabetic)  02/21/2025    Depression Monitoring  02/28/2025    Colorectal Cancer Screen  10/06/2027    Hepatitis A vaccine  Aged Out    Hib vaccine  Aged Out    HPV vaccine  Aged Out    Polio vaccine  Aged Out    Meningococcal (ACWY) vaccine  Aged Out     Recommendations for Preventive Services Due: see orders and patient instructions/AVS.    Return if symptoms worsen or fail to improve.         None

## 2024-03-03 DIAGNOSIS — D64.9 ANEMIA, UNSPECIFIED TYPE: Primary | ICD-10-CM

## 2024-03-03 DIAGNOSIS — R63.4 UNEXPLAINED WEIGHT LOSS: ICD-10-CM

## 2024-03-04 ENCOUNTER — TELEPHONE (OUTPATIENT)
Dept: ADMINISTRATIVE | Age: 49
End: 2024-03-04

## 2024-03-04 NOTE — TELEPHONE ENCOUNTER
Pt called to schedule referral from Dr. Fisher.  The diagnosis is dysfunctional uterine bleeding.  He told her to schedule asap.  No availability with Dr. Foster until July.  Unsure if pt needs seen sooner.  Staff unavailable due to pt care.  Please contact pt.

## 2024-03-14 ENCOUNTER — OFFICE VISIT (OUTPATIENT)
Dept: SURGERY | Age: 49
End: 2024-03-14
Payer: COMMERCIAL

## 2024-03-14 VITALS
HEART RATE: 97 BPM | BODY MASS INDEX: 32.49 KG/M2 | WEIGHT: 207 LBS | OXYGEN SATURATION: 99 % | TEMPERATURE: 97.6 F | HEIGHT: 67 IN | SYSTOLIC BLOOD PRESSURE: 139 MMHG | DIASTOLIC BLOOD PRESSURE: 93 MMHG

## 2024-03-14 DIAGNOSIS — K83.8 DILATED CBD, ACQUIRED: Primary | ICD-10-CM

## 2024-03-14 DIAGNOSIS — K21.9 GASTROESOPHAGEAL REFLUX DISEASE WITHOUT ESOPHAGITIS: ICD-10-CM

## 2024-03-14 DIAGNOSIS — R63.4 WEIGHT LOSS: ICD-10-CM

## 2024-03-14 PROCEDURE — 3075F SYST BP GE 130 - 139MM HG: CPT | Performed by: SURGERY

## 2024-03-14 PROCEDURE — 99214 OFFICE O/P EST MOD 30 MIN: CPT | Performed by: SURGERY

## 2024-03-14 PROCEDURE — 3080F DIAST BP >= 90 MM HG: CPT | Performed by: SURGERY

## 2024-03-14 NOTE — PATIENT INSTRUCTIONS
Urine culture is positive for infection. Stop Cipro. We sent in culture specific Macrobid to treat. Take this antibiotic until gone. Take this with food and eat yogurt once per day to prevent GI upset. If you develop nausea, vomiting, or fevers call the office or go to the ER. If your urinary symptoms do not improve once completing the antibiotics call our office. Repeat urine culture once antibiotics are completed.
[employer].   Care instructions adapted under license by [facility]. If you have questions about a medical condition or this instruction, always ask your healthcare professional. [employer] disclaims any warranty or liability for your use of this information.

## 2024-03-14 NOTE — PROGRESS NOTES
C/o chest pressure and sore throat Age of Onset    High Blood Pressure Mother     High Blood Pressure Father     Diabetes Father     Cancer Maternal Grandmother         unknown if it was uterine, ovarian or cervix     Diabetes Paternal Grandfather        REVIEW OF SYSTEMS:    Constitutional: negative  Eyes: negative  Ears, nose, mouth, throat, and face: negative  Respiratory: negative  Cardiovascular: negative  Gastrointestinal: as in HPI  Genitourinary:negative  Integument/breast: negative  Hematologic/lymphatic: negative  Musculoskeletal:negative  Neurological: negative  Allergic/Immunologic: negative    PHYSICAL EXAM   BP (!) 139/93 (Site: Left Upper Arm, Position: Sitting, Cuff Size: Large Adult)   Pulse 97   Temp 97.6 °F (36.4 °C) (Temporal)   Ht 1.702 m (5' 7\")   Wt 93.9 kg (207 lb)   SpO2 99%   BMI 32.42 kg/m²     General appearance: alert, cooperative and in no acute distress.  Eyes: Grossly normal   Lungs: normal work of breathing  Heart: regular rate  Abdomen:  soft, mild upper abdominal tenderness, non-distended  Skin: No skin abnormalities  Neurologic: Alert and oriented x 3. Grossly normal  Musculoskeletal: No edema.    DATA:  RUQ US:  1. Gallbladder wall polyps.  2. Abnormally dilated common bile duct at 8 mm.  3. Borderline splenomegaly.    Pelvic US:  IMPRESSION:  Normal transabdominal appearance of the uterus, endometrium, and bilateral  ovaries.  There are no adnexal masses.  Normal ovarian vascularity.    ASSESSMENT AND PLAN:     Bailee Andrews is an 49 y.o. female who presents with weight loss, dilated CBD, gallbladder polyps     All available labs and pathology reviewed.  All imaging independently reviewed and interpreted.   All provider notes reviewed.  The above was discussed with the patient at length.    I will set the patient up for an EGD and colonoscopy, possible biopsy, possible polypectomy.  I explained the risks including but not limited to bleeding, perforation leading to possible surgery, or infection. The

## 2024-03-15 ENCOUNTER — TELEPHONE (OUTPATIENT)
Dept: SURGERY | Age: 49
End: 2024-03-15

## 2024-03-15 NOTE — TELEPHONE ENCOUNTER
Prior Authorization Form:      DEMOGRAPHICS:                     Patient Name:  Tiara Andrews  Patient :  1975            Insurance:  Payor: BCBS / Plan: [place] - [place] PPO / Product Type: *No Product type* /   Insurance ID Number:    Payer/Plan Subscr  Sex Relation Sub. Ins. ID Effective Group Num   1. BCBS - BCBS -* TIARA ANDREWS 1975 Female Self [id number] 3/1/23 [id number]                                   [id number]         DIAGNOSIS & PROCEDURE:                       Procedure/Operation: Colonoscopy    EGD           CPT Code: 16655     97175    Diagnosis:  h/o colon polyps, diarrhea       abdominal fullness, weight loss, heartburn, GERD       ICD10 Code: Z86, R19.7    R14, R63.4, R12, K21.9    Location:  [place]    Surgeon:  Neal Morales    SCHEDULING INFORMATION:                          Date: 24    Time: 0800a              Anesthesia:  MAC/TIVA                                                       Status:  Outpatient        Special Comments:         Electronically signed by Miladys Armstrong LPN on 3/15/2024 at 11:40 AM delonte

## 2024-03-18 ENCOUNTER — PATIENT MESSAGE (OUTPATIENT)
Dept: SURGERY | Age: 49
End: 2024-03-18

## 2024-03-22 NOTE — PROGRESS NOTES
[facility] PRE-ADMISSION TESTING INSTRUCTIONS    The Preadmission Testing patient is instructed accordingly using the following criteria (check applicable):    ARRIVAL INSTRUCTIONS:  [x] Parking the day of Surgery is located in the Main Entrance lot.  Upon entering the door, make an immediate right to the surgery reception desk    [x] Bring photo ID and insurance card    [] Bring in a copy of Living will or Durable Power of  papers.    [x] Please be sure to arrange for responsible adult to provide transportation to and from the hospital    [x] Please arrange for responsible adult to be with you for the 24 hour period post procedure due to having anesthesia    [x] If you awake am of surgery not feeling well or have temperature >100 please call [phone]    GENERAL INSTRUCTIONS:    [x] NPO AFTER MIDNIGHT       No gum, candy or mints.    [x] You may brush your teeth, but do not swallow any water    [x] Take medications as instructed with 1-2 oz of water    [] Stop herbal supplements and vitamins 5 days prior to procedure    [x] Follow preop dosing of blood thinners per physician instructions    [] Take 1/2 dose of evening insulin, but no insulin after midnight    [] No oral diabetic medications after midnight    [x] If diabetic and have low blood sugar or feel symptomatic, take 1-2oz apple juice only.    [] Bring inhalers day of surgery    [] Bring C-PAP/ Bi-Pap day of surgery    [x] Bring urine specimen day of surgery    [x] Shower or bath with soap, lather and rinse well, AM of Surgery, no lotion, powders or creams to surgical site    [x] Follow bowel prep as instructed per surgeon    [x] No tobacco products within 24 hours of surgery     [x] No alcohol or illegal drug use within 24 hours of surgery.    [x] Jewelry, body piercing's, eyeglasses, contact lenses and dentures are not permitted into surgery (bring cases)      [] Please do not wear any nail polish, make up or hair

## 2024-03-25 ENCOUNTER — ANESTHESIA EVENT (OUTPATIENT)
Dept: ENDOSCOPY | Age: 49
End: 2024-03-25
Payer: COMMERCIAL

## 2024-03-25 ENCOUNTER — HOSPITAL ENCOUNTER (OUTPATIENT)
Dept: MRI IMAGING | Age: 49
Discharge: HOME | End: 2024-03-27
Attending: SURGERY
Payer: COMMERCIAL

## 2024-03-25 DIAGNOSIS — K83.8 DILATED CBD, ACQUIRED: ICD-10-CM

## 2024-03-25 PROCEDURE — 6360000004 HC RX CONTRAST MEDICATION: Performed by: RADIOLOGY

## 2024-03-25 PROCEDURE — 74183 MRI ABD W/O CNTR FLWD CNTR: CPT

## 2024-03-25 PROCEDURE — A9579 GAD-BASE MR CONTRAST NOS,1ML: HCPCS | Performed by: RADIOLOGY

## 2024-03-25 RX ADMIN — GADOTERIDOL 19 ML: 279.3 INJECTION, SOLUTION INTRAVENOUS at 09:43

## 2024-03-27 ENCOUNTER — ANESTHESIA (OUTPATIENT)
Dept: ENDOSCOPY | Age: 49
End: 2024-03-27
Payer: COMMERCIAL

## 2024-03-27 ENCOUNTER — HOSPITAL ENCOUNTER (OUTPATIENT)
Age: 49
Setting detail: OUTPATIENT SURGERY
Discharge: HOME | End: 2024-03-27
Attending: SURGERY | Admitting: SURGERY
Payer: COMMERCIAL

## 2024-03-27 VITALS
DIASTOLIC BLOOD PRESSURE: 60 MMHG | HEART RATE: 75 BPM | WEIGHT: 206 LBS | BODY MASS INDEX: 32.33 KG/M2 | HEIGHT: 67 IN | OXYGEN SATURATION: 98 % | RESPIRATION RATE: 15 BRPM | SYSTOLIC BLOOD PRESSURE: 121 MMHG

## 2024-03-27 DIAGNOSIS — R19.8 ABDOMINAL FULLNESS: ICD-10-CM

## 2024-03-27 DIAGNOSIS — R12 HEARTBURN: ICD-10-CM

## 2024-03-27 DIAGNOSIS — R19.7 DIARRHEA, UNSPECIFIED TYPE: ICD-10-CM

## 2024-03-27 DIAGNOSIS — R63.4 WEIGHT LOSS: ICD-10-CM

## 2024-03-27 DIAGNOSIS — Z86.010 HISTORY OF COLON POLYPS: ICD-10-CM

## 2024-03-27 DIAGNOSIS — K21.9 GASTROESOPHAGEAL REFLUX DISEASE WITHOUT ESOPHAGITIS: ICD-10-CM

## 2024-03-27 LAB
CHP ED QC CHECK: NORMAL
GLUCOSE BLD-MCNC: NORMAL MG/DL
HCG, URINE, POC: NEGATIVE
Lab: NORMAL
NEGATIVE QC PASS/FAIL: NORMAL
POSITIVE QC PASS/FAIL: NORMAL

## 2024-03-27 PROCEDURE — 88305 TISSUE EXAM BY PATHOLOGIST: CPT

## 2024-03-27 PROCEDURE — 3609012400 HC EGD TRANSORAL BIOPSY SINGLE/MULTIPLE: Performed by: SURGERY

## 2024-03-27 PROCEDURE — 2709999900 HC NON-CHARGEABLE SUPPLY: Performed by: SURGERY

## 2024-03-27 PROCEDURE — 7100000010 HC PHASE II RECOVERY - FIRST 15 MIN: Performed by: SURGERY

## 2024-03-27 PROCEDURE — 2580000003 HC RX 258

## 2024-03-27 PROCEDURE — 7100000011 HC PHASE II RECOVERY - ADDTL 15 MIN: Performed by: SURGERY

## 2024-03-27 PROCEDURE — 3700000000 HC ANESTHESIA ATTENDED CARE: Performed by: SURGERY

## 2024-03-27 PROCEDURE — 6360000002 HC RX W HCPCS

## 2024-03-27 PROCEDURE — 3609010300 HC COLONOSCOPY W/BIOPSY SINGLE/MULTIPLE: Performed by: SURGERY

## 2024-03-27 PROCEDURE — 3700000001 HC ADD 15 MINUTES (ANESTHESIA): Performed by: SURGERY

## 2024-03-27 RX ORDER — PROPOFOL 10 MG/ML
INJECTION, EMULSION INTRAVENOUS PRN
Status: DISCONTINUED | OUTPATIENT
Start: 2024-03-27 | End: 2024-03-27 | Stop reason: SDUPTHER

## 2024-03-27 RX ORDER — SODIUM CHLORIDE 9 MG/ML
INJECTION, SOLUTION INTRAVENOUS CONTINUOUS PRN
Status: DISCONTINUED | OUTPATIENT
Start: 2024-03-27 | End: 2024-03-27 | Stop reason: SDUPTHER

## 2024-03-27 RX ADMIN — SODIUM CHLORIDE: 9 INJECTION, SOLUTION INTRAVENOUS at 06:59

## 2024-03-27 RX ADMIN — PROPOFOL 430 MG: 10 INJECTION, EMULSION INTRAVENOUS at 07:06

## 2024-03-27 ASSESSMENT — LIFESTYLE VARIABLES: SMOKING_STATUS: 1

## 2024-03-27 NOTE — ANESTHESIA POSTPROCEDURE EVALUATION
Department of Anesthesiology  Postprocedure Note    Patient: Bailee Andrews  MRN: [id number]  YOB: 1975  Date of evaluation: 3/27/2024    Procedure Summary       Date: 03/27/24 Room / Location: [place] [address] / [facility]    Anesthesia Start: 0659 Anesthesia Stop: 0727    Procedures:       COLONOSCOPY DIAGNOSTIC      ESOPHAGOGASTRODUODENOSCOPY BIOPSY Diagnosis:       Heartburn      Diarrhea, unspecified type      Weight loss      Abdominal fullness      Gastroesophageal reflux disease without esophagitis      History of colon polyps      (Heartburn [R12])      (Diarrhea, unspecified type [R19.7])      (Weight loss [R63.4])      (Abdominal fullness [R19.8])      (Gastroesophageal reflux disease without esophagitis [K21.9])      (History of colon polyps [Z86.010])    Surgeons: Pily Whittaker MD Responsible Provider: Karlo Rogers Jr., MD    Anesthesia Type: MAC ASA Status: 3            Anesthesia Type: MAC    João Phase I: João Score: 10    João Phase II:      Anesthesia Post Evaluation    Patient location during evaluation: bedside  Patient participation: complete - patient participated  Level of consciousness: awake and alert  Pain score: 0  Airway patency: patent  Nausea & Vomiting: no nausea and no vomiting  Cardiovascular status: blood pressure returned to baseline  Respiratory status: acceptable  Hydration status: stable  Pain management: adequate        No notable events documented.

## 2024-03-27 NOTE — OP NOTE
Operative Note: EGD and Colonoscopy    Bailee Andrews     DATE OF PROCEDURE: 3/27/2024  SURGEON: Dr. PILY CATHERINE MD, M.D.     PREOPERATIVE DIAGNOSES:   Heartburn  Diarrhea  Weight loss    POSTOPERATIVE DIAGNOSES:   GERD  Hiatal hernia    Normal appearing colon and TI    OPERATION:    EGD esophagogastroduodenoscopy With antral, duodenal, distal esophageal biopsies                   Colonoscopy to the terminal ileum with TI and random colon biopsies    SPECIMENS:  ID Type Source Tests Collected by Time Destination   A : ANTRAL BX Tissue Stomach SURGICAL PATHOLOGY Pily Catherine MD 3/27/2024 0724    B : DUODENAL BX Tissue Duodenum SURGICAL PATHOLOGY Pily Catherine MD 3/27/2024 0724    C : DISTAL ESOPHGUS BX Tissue Esophagus SURGICAL PATHOLOGY Pily Catherine MD 3/27/2024 0724    D : TERMINAL ILEUM BX Tissue Tissue SURGICAL PATHOLOGY Pily Catherine MD 3/27/2024 0721    E : RANDOM COLON BX Tissue Colon SURGICAL PATHOLOGY Pily Catherine MD 3/27/2024 0723        BLOOD LOSS: Minimal    ANESTHESIA: LMAC    CONSENT AND INDICATIONS:  This is a 49 y.o. year old female who is having the above.. I have discussed with the patient and/or the patient representative the indication, alternatives, and the possible risks and/or complications of the planned procedure and the anesthesia methods. The patient and/or patient representative appear to understand and agree to proceed.    OPERATIONS: The patient was placed on the table and sedated via LMAC. Bite block was placed. A lubricated scope was easily passed into the upper esophagus which looked normal. The distal esophagus looked abnormal: GERD and biopseis were taken. The gastroesophageal junction was at 40 cm. The scope was passed into the stomach and retroflexed. There was 2 cm sliding hiatal hernia. The scope was passed down toward the pylorus. The antral mucosa all looked normal. Biopsy was taken. The scope was then passed

## 2024-03-27 NOTE — DISCHARGE INSTRUCTIONS
[facility] Colonoscopy PROCEDURE DISCHARGE INSTRUCTIONS  You may be drowsy or lightheaded after receiving sedation or anesthesia.    A responsible person should be with you for the next 24 hours.    Please follow the instructions checked below:    DIET INSTRUCTIONS:  [x]Start with light diet and progress to your normal diet as you feel like eating. If you experience nausea or repeated episodes of vomiting which persist beyond 12-24 hours, notify your doctor.  []Other     ACTIVITY INSTRUCTIONS:  [x]Rest today. Increase activity as tolerated    []No heavy lifting or strenuous activity     [x]No driving for today  []Other      MEDICATION INSTRUCTIONS:    []Prescriptions sent with you.  Use as directed.  When taking pain medications, you may experience dizziness or drowsiness.  Do not drink alcohol or drive when taking these medications.  [x]Continue preop medications                               Post-procedure Care   If any tissue was removed:   It will be sent to a lab to be examined. It may take 1-2 weeks for results. The doctor will usually give an initial report after the scope is removed. Other tests may be recommended.   A small amount of bleeding may occur during the first few days after the procedure.     When you return home after the procedure, be sure to follow your doctor's instructions, which may include:   Resume medicines as instructed by your doctor.   Resume normal diet, unless directed otherwise by your doctor.   The sedative will make you drowsy. Avoid driving, operating machinery, or making important decisions for the rest of the day.   Rest for the remainder of the day.     After arriving home, contact your doctor if any of the following occurs:   Bleeding from your rectum, notify your doctor if you pass a teaspoonful of blood or more.   Black, tarry stools   Severe abdominal pain   Hard, swollen abdomen   Signs of infection, including fever or chills   Inability to pass gas or  Refill already sent to pharmacy earlier today

## 2024-03-27 NOTE — H&P
Patient's office history and physical was reviewed.    Patient examined.    There has been no change in the patient's history and physical.      Physician Signature: [facility]    Patient's Name/Date of Birth: Bailee Andrews / 1975    Date: 3/14/2024    PCP: Ellis Fisher DO    Referring Physician:   No ref. provider found  N/A    CHIEF COMPLAINT:    No chief complaint on file.        HISTORY OF PRESENT ILLNESS:    Bailee Andrews is an 49 y.o. female who presents with abdominal fullness, weight loss. The patient has heartburn issue sand has been taking Nexium. She said she is having intermittent watery stools. No nausea, vomiting, diarrhea, constipation. No changes in stool caliber. No bloody or black stools. No abdominal pain. She has had a 50 pound unintentional weight loss in the past 6 months. No family history of colon cancer. The patient has a known history of: no known risk factors. The patient has had a colonoscopy and EGD in the past. She had an EGD for GERD symptoms. She said she has had polyps removed from her colon.     Past Medical History:   Past Medical History:   Diagnosis Date    Diabetes mellitus (HCC)     Fatty liver disease, nonalcoholic     GERD (gastroesophageal reflux disease)     Migraine     PCOS (polycystic ovarian syndrome)     Post partum depression         Past Surgical History:   Past Surgical History:   Procedure Laterality Date     SECTION      DILATION AND CURETTAGE OF UTERUS          Allergies: Patient has no known allergies.     Medications:   No current facility-administered medications for this encounter.     Facility-Administered Medications Ordered in Other Encounters   Medication Dose Route Frequency Provider Last Rate Last Admin    0.9 % sodium chloride infusion   IntraVENous Continuous PRN Avila Morley APRN - CRNA   New Bag at 24 [phone]         Social History:   Social History

## 2024-03-27 NOTE — ANESTHESIA PRE PROCEDURE
Bd ultrafine needles to use with victoza pen 23   Ellis Fisher, DO   venlafaxine (EFFEXOR XR) 150 MG extended release capsule Take 1 capsule by mouth daily 3/17/23   Ellis Fisher F, DO   doxycycline hyclate (VIBRAMYCIN) 100 MG capsule at bedtime 22   Provider, MD Iraj   Insulin Pen Needle (BD PEN NEEDLE WEST U/F) 32G X 4 MM MISC Uses once per day 22   Justin Corona APRN - CNS   Insulin Infusion Pump (MINIMED 770G INSULIN PUMP SYS) KIT To infuse insulin 21   Evangelist Simeon MD   albuterol sulfate  (90 Base) MCG/ACT inhaler Inhale 2 puffs into the lungs every 4 hours as needed for Wheezing 21   Noris Bacon APRN - CNP       Current medications:    No current facility-administered medications for this encounter.       Allergies:  No Known Allergies    Problem List:    Patient Active Problem List   Diagnosis Code    GERD (gastroesophageal reflux disease) K21.9    Essential hypertension I10    Type 2 diabetes mellitus without complication, without long-term current use of insulin (HCC) E11.9    Familial hyperlipidemia E78.49    Irritable bowel syndrome with diarrhea K58.0    Dysfunction of both eustachian tubes H69.93    DUB (dysfunctional uterine bleeding) N93.8    Current mild episode of major depressive disorder without prior episode (HCC) F32.0    Migraine without aura and without status migrainosus, not intractable G43.009       Past Medical History:        Diagnosis Date    Diabetes mellitus (HCC)     Fatty liver disease, nonalcoholic     GERD (gastroesophageal reflux disease)     Migraine     PCOS (polycystic ovarian syndrome)     Post partum depression        Past Surgical History:        Procedure Laterality Date     SECTION      DILATION AND CURETTAGE OF UTERUS         Social History:    Social History     Tobacco Use    Smoking status: Every Day     Current packs/day: 0.00     Types: Cigars, Cigarettes     Start date: 2006     Last attempt to

## 2024-03-28 ENCOUNTER — HOSPITAL ENCOUNTER (OUTPATIENT)
Dept: INFUSION THERAPY | Age: 49
Discharge: HOME | End: 2024-03-28
Payer: COMMERCIAL

## 2024-03-28 ENCOUNTER — OFFICE VISIT (OUTPATIENT)
Dept: ONCOLOGY | Age: 49
End: 2024-03-28
Payer: COMMERCIAL

## 2024-03-28 ENCOUNTER — HOSPITAL ENCOUNTER (OUTPATIENT)
Age: 49
Discharge: HOME | End: 2024-03-28

## 2024-03-28 VITALS
SYSTOLIC BLOOD PRESSURE: 140 MMHG | HEIGHT: 67 IN | TEMPERATURE: 97.4 F | OXYGEN SATURATION: 98 % | WEIGHT: 209.8 LBS | HEART RATE: 96 BPM | BODY MASS INDEX: 32.93 KG/M2 | DIASTOLIC BLOOD PRESSURE: 92 MMHG

## 2024-03-28 DIAGNOSIS — R63.4 WEIGHT LOSS OF MORE THAN 10% BODY WEIGHT: Primary | ICD-10-CM

## 2024-03-28 DIAGNOSIS — R63.4 WEIGHT LOSS OF MORE THAN 10% BODY WEIGHT: ICD-10-CM

## 2024-03-28 LAB
ALBUMIN SERPL-MCNC: 4.2 G/DL (ref 3.5–5.2)
ALP SERPL-CCNC: 67 U/L (ref 35–104)
ALT SERPL-CCNC: 16 U/L (ref 0–32)
ANION GAP SERPL CALCULATED.3IONS-SCNC: 11 MMOL/L (ref 7–16)
AST SERPL-CCNC: 19 U/L (ref 0–31)
BASOPHILS # BLD: 0.06 K/UL (ref 0–0.2)
BASOPHILS NFR BLD: 1 % (ref 0–2)
BILIRUB SERPL-MCNC: 0.2 MG/DL (ref 0–1.2)
BUN SERPL-MCNC: 12 MG/DL (ref 6–20)
CALCIUM SERPL-MCNC: 9.1 MG/DL (ref 8.6–10.2)
CHLORIDE SERPL-SCNC: 107 MMOL/L (ref 98–107)
CO2 SERPL-SCNC: 22 MMOL/L (ref 22–29)
CREAT SERPL-MCNC: 0.7 MG/DL (ref 0.5–1)
EOSINOPHIL # BLD: 0.07 K/UL (ref 0.05–0.5)
EOSINOPHILS RELATIVE PERCENT: 1 % (ref 0–6)
ERYTHROCYTE [DISTWIDTH] IN BLOOD BY AUTOMATED COUNT: 19.5 % (ref 11.5–15)
FERRITIN SERPL-MCNC: 12 NG/ML
FOLATE SERPL-MCNC: 11.6 NG/ML (ref 4.8–24.2)
GFR SERPL CREATININE-BSD FRML MDRD: >90 ML/MIN/1.73M2
GLUCOSE SERPL-MCNC: 99 MG/DL (ref 74–99)
HAPTOGLOB SERPL-MCNC: 173 MG/DL (ref 30–200)
HCT VFR BLD AUTO: 35.2 % (ref 34–48)
HGB BLD-MCNC: 10.3 G/DL (ref 11.5–15.5)
IMM GRANULOCYTES # BLD AUTO: 0.03 K/UL (ref 0–0.58)
IMM GRANULOCYTES NFR BLD: 0 % (ref 0–5)
IMM RETICS NFR: 21.4 % (ref 3–15.9)
IRON SATN MFR SERPL: 5 % (ref 15–50)
IRON SERPL-MCNC: 21 UG/DL (ref 37–145)
LDH SERPL-CCNC: 198 U/L (ref 135–214)
LYMPHOCYTES NFR BLD: 2.8 K/UL (ref 1.5–4)
LYMPHOCYTES RELATIVE PERCENT: 29 % (ref 20–42)
MCH RBC QN AUTO: 23.1 PG (ref 26–35)
MCHC RBC AUTO-ENTMCNC: 29.3 G/DL (ref 32–34.5)
MCV RBC AUTO: 78.9 FL (ref 80–99.9)
MONOCYTES NFR BLD: 0.62 K/UL (ref 0.1–0.95)
MONOCYTES NFR BLD: 6 % (ref 2–12)
NEUTROPHILS NFR BLD: 64 % (ref 43–80)
NEUTS SEG NFR BLD: 6.26 K/UL (ref 1.8–7.3)
PLATELET # BLD AUTO: 420 K/UL (ref 130–450)
PMV BLD AUTO: 10.9 FL (ref 7–12)
POTASSIUM SERPL-SCNC: 4.1 MMOL/L (ref 3.5–5)
PROT SERPL-MCNC: 6.9 G/DL (ref 6.4–8.3)
RBC # BLD AUTO: 4.46 M/UL (ref 3.5–5.5)
RETIC HEMOGLOBIN: 24 PG (ref 28.2–36.6)
RETICS # AUTO: 0.07 M/UL
RETICS/RBC NFR AUTO: 1.7 % (ref 0.4–1.9)
SODIUM SERPL-SCNC: 140 MMOL/L (ref 132–146)
TIBC SERPL-MCNC: 393 UG/DL (ref 250–450)
VIT B12 SERPL-MCNC: 774 PG/ML (ref 211–946)
WBC OTHER # BLD: 9.8 K/UL (ref 4.5–11.5)

## 2024-03-28 PROCEDURE — 80053 COMPREHEN METABOLIC PANEL: CPT

## 2024-03-28 PROCEDURE — 83010 ASSAY OF HAPTOGLOBIN QUANT: CPT

## 2024-03-28 PROCEDURE — 86334 IMMUNOFIX E-PHORESIS SERUM: CPT

## 2024-03-28 PROCEDURE — 83615 LACTATE (LD) (LDH) ENZYME: CPT

## 2024-03-28 PROCEDURE — 82746 ASSAY OF FOLIC ACID SERUM: CPT

## 2024-03-28 PROCEDURE — 85025 COMPLETE CBC W/AUTO DIFF WBC: CPT

## 2024-03-28 PROCEDURE — 3077F SYST BP >= 140 MM HG: CPT | Performed by: INTERNAL MEDICINE

## 2024-03-28 PROCEDURE — 36415 COLL VENOUS BLD VENIPUNCTURE: CPT

## 2024-03-28 PROCEDURE — 82728 ASSAY OF FERRITIN: CPT

## 2024-03-28 PROCEDURE — 99204 OFFICE O/P NEW MOD 45 MIN: CPT | Performed by: INTERNAL MEDICINE

## 2024-03-28 PROCEDURE — 85045 AUTOMATED RETICULOCYTE COUNT: CPT

## 2024-03-28 PROCEDURE — 3080F DIAST BP >= 90 MM HG: CPT | Performed by: INTERNAL MEDICINE

## 2024-03-28 PROCEDURE — 84165 PROTEIN E-PHORESIS SERUM: CPT

## 2024-03-28 PROCEDURE — 99214 OFFICE O/P EST MOD 30 MIN: CPT

## 2024-03-28 PROCEDURE — 84155 ASSAY OF PROTEIN SERUM: CPT

## 2024-03-28 PROCEDURE — 83550 IRON BINDING TEST: CPT

## 2024-03-28 PROCEDURE — 82607 VITAMIN B-12: CPT

## 2024-03-28 PROCEDURE — 83540 ASSAY OF IRON: CPT

## 2024-03-28 PROCEDURE — 83521 IG LIGHT CHAINS FREE EACH: CPT

## 2024-03-28 NOTE — PROGRESS NOTES
Requesting Physician:      CHIEF COMPLAINT:   Chief Complaint   Patient presents with    New Patient    Anemia       PRIMARY HEMATOLOGIC/ONCOLOGIC DIAGNOSES:  Anemia, microcytic  Weight loss    HISTORY OF PRESENT ILLNESS:   Bailee Andrews is a 50yo female who presents for evaluation of anemia.             Component  Ref Range & Units 3/1/24 1000 2/23/23 1342 8/13/21 0959 7/29/21 0858 11/11/20 0821 10/29/20 0859 9/9/19 1152   WBC  4.5 - 11.5 k/uL 9.4 11.0 R 9.4 R 10.8 R 10.4 R 12.2 High  R 7.6 R   RBC  3.50 - 5.50 m/uL 4.86 4.64 R 4.80 R 4.81 R 4.96 R 4.99 R 4.84 R   Hemoglobin  11.5 - 15.5 g/dL 11.0 Low  11.1 Low  R 10.2 Low  10.2 Low  11.7 11.6 12.3   Hematocrit  34.0 - 48.0 % 37.8 35.6 Low  R 35.3 35.4 39.1 39.1 41.0   MCV  80.0 - 99.9 fL 77.8 Low  76.6 Low  R 73.5 Low  73.6 Low  78.8 Low  78.4 Low  84.7   MCH  26.0 - 35.0 pg 22.6 Low  23.8 Low  R 21.3 Low  21.2 Low  23.6 Low  23.2 Low  25.4 Low    MCHC  32.0 - 34.5 g/dL 29.1 Low  31.1 Low  R 28.9 Low  R 28.8 Low  R 29.9 Low  R 29.7 Low  R 30.0 Low  R   RDW  11.5 - 15.0 % 20.0 High  18.5 High  R 19.8 High  R 19.6 High  R 19.0 High  R 18.7 High  R 16.0 High  R   Platelets  130 - 450 k/uL 427 346 R 393 R 377 R 341 R 341 R 326 R   MPV  7.0 - 12.0 fL 12.3 High  9.3 R 12.6 High  12.4 High  13.1 High  13.1 High  14.1 High    Neutrophils %  43.0 - 80.0 % 59  68.7  63.5     Lymphocytes %  20.0 - 42.0 % 32 27.6 R 20.9  26.4     Monocytes %  2.0 - 12.0 % 7 7.1 R 10.4  7.3     Eosinophils %  0 - 6 % 1 1.1 R 2.0 R  1.7 R     Basophils %  0.0 - 2.0 % 1 1.2 R 0.9  0.6     Immature Granulocytes  0.0 - 5.0 % 0         Neutrophils Absolute  1.80 - 7.30 k/uL 5.55 6.9 R 6.49 R  6.60 R     Lymphocytes Absolute  1.50 - 4.00 k/uL 2.98 3.0 R 1.97 R  2.75 R     Monocytes Absolute  0.10 - 0.95 k/uL 0.64 0.8 High  R 0.94 R  0.76 R     Eosinophils Absolute  0.05 - 0.50 k/uL 0.12 0.1 R 0.00 Low  R  0.18 R   
      Maya Coppola RN

## 2024-03-29 DIAGNOSIS — D50.9 IRON DEFICIENCY ANEMIA, UNSPECIFIED IRON DEFICIENCY ANEMIA TYPE: Primary | ICD-10-CM

## 2024-03-29 LAB
ALBUMIN SERPL-MCNC: 3.5 G/DL (ref 3.5–4.7)
ALPHA1 GLOB SERPL ELPH-MCNC: 0.3 G/DL (ref 0.2–0.4)
ALPHA2 GLOB SERPL ELPH-MCNC: 0.8 G/DL (ref 0.5–1)
B-GLOBULIN SERPL ELPH-MCNC: 1 G/DL (ref 0.8–1.3)
GAMMA GLOB SERPL ELPH-MCNC: 1.1 G/DL (ref 0.7–1.6)
INTERPRETATION SERPL IFE-IMP: NORMAL
PATH REV: NORMAL
PATHOLOGIST: NORMAL
PROT PATTERN SERPL ELPH-IMP: NORMAL
PROT SERPL-MCNC: 6.6 G/DL (ref 6.4–8.3)
SURGICAL PATHOLOGY REPORT: NORMAL

## 2024-03-29 RX ORDER — DIPHENHYDRAMINE HYDROCHLORIDE 50 MG/ML
50 INJECTION INTRAMUSCULAR; INTRAVENOUS
OUTPATIENT
Start: 2024-04-02

## 2024-03-29 RX ORDER — SODIUM CHLORIDE 9 MG/ML
5-250 INJECTION, SOLUTION INTRAVENOUS PRN
OUTPATIENT
Start: 2024-04-02

## 2024-03-29 RX ORDER — HEPARIN SODIUM (PORCINE) LOCK FLUSH IV SOLN 100 UNIT/ML 100 UNIT/ML
500 SOLUTION INTRAVENOUS PRN
OUTPATIENT
Start: 2024-04-02

## 2024-03-29 RX ORDER — SODIUM CHLORIDE 0.9 % (FLUSH) 0.9 %
5-40 SYRINGE (ML) INJECTION PRN
OUTPATIENT
Start: 2024-04-02

## 2024-03-29 RX ORDER — ONDANSETRON 2 MG/ML
8 INJECTION INTRAMUSCULAR; INTRAVENOUS
OUTPATIENT
Start: 2024-04-02

## 2024-03-29 RX ORDER — SODIUM CHLORIDE 9 MG/ML
INJECTION, SOLUTION INTRAVENOUS CONTINUOUS
OUTPATIENT
Start: 2024-04-02

## 2024-03-29 RX ORDER — ALBUTEROL SULFATE 90 UG/1
4 AEROSOL, METERED RESPIRATORY (INHALATION) PRN
OUTPATIENT
Start: 2024-04-02

## 2024-03-29 RX ORDER — EPINEPHRINE 1 MG/ML
0.3 INJECTION, SOLUTION, CONCENTRATE INTRAVENOUS PRN
OUTPATIENT
Start: 2024-04-02

## 2024-03-29 RX ORDER — ACETAMINOPHEN 325 MG/1
650 TABLET ORAL
OUTPATIENT
Start: 2024-04-02

## 2024-03-29 RX ORDER — FAMOTIDINE 10 MG/ML
20 INJECTION, SOLUTION INTRAVENOUS
OUTPATIENT
Start: 2024-04-02

## 2024-03-30 LAB
FREE KAPPA/LAMBDA RATIO: 1.28 (ref 0.22–1.74)
KAPPA LC FREE SER-MCNC: 30 MG/L
LAMBDA LC FREE SERPL-MCNC: 23.5 MG/L (ref 4.2–27.7)

## 2024-04-03 ENCOUNTER — HOSPITAL ENCOUNTER (OUTPATIENT)
Dept: PET IMAGING | Age: 49
Discharge: HOME | End: 2024-04-05
Attending: INTERNAL MEDICINE
Payer: COMMERCIAL

## 2024-04-03 ENCOUNTER — TELEPHONE (OUTPATIENT)
Dept: INFUSION THERAPY | Age: 49
End: 2024-04-03

## 2024-04-03 DIAGNOSIS — R63.4 WEIGHT LOSS OF MORE THAN 10% BODY WEIGHT: ICD-10-CM

## 2024-04-03 DIAGNOSIS — E04.1 THYROID NODULE: Primary | ICD-10-CM

## 2024-04-03 LAB — GLUCOSE BLD-MCNC: 135 MG/DL (ref 74–99)

## 2024-04-03 PROCEDURE — A9609 HC RX DIAGNOSTIC RADIOPHARMACEUTICAL: HCPCS | Performed by: RADIOLOGY

## 2024-04-03 PROCEDURE — 82962 GLUCOSE BLOOD TEST: CPT

## 2024-04-03 PROCEDURE — 78815 PET IMAGE W/CT SKULL-THIGH: CPT

## 2024-04-03 PROCEDURE — 3430000000 HC RX DIAGNOSTIC RADIOPHARMACEUTICAL: Performed by: RADIOLOGY

## 2024-04-03 RX ORDER — FLUDEOXYGLUCOSE F 18 200 MCI/ML
15 INJECTION, SOLUTION INTRAVENOUS
Status: COMPLETED | OUTPATIENT
Start: 2024-04-03 | End: 2024-04-03

## 2024-04-03 RX ADMIN — FLUDEOXYGLUCOSE F 18 15 MILLICURIE: 200 INJECTION, SOLUTION INTRAVENOUS at 07:45

## 2024-04-05 ENCOUNTER — HOSPITAL ENCOUNTER (OUTPATIENT)
Dept: INFUSION THERAPY | Age: 49
Discharge: HOME | End: 2024-04-05
Payer: COMMERCIAL

## 2024-04-05 VITALS
SYSTOLIC BLOOD PRESSURE: 152 MMHG | DIASTOLIC BLOOD PRESSURE: 94 MMHG | TEMPERATURE: 98.1 F | RESPIRATION RATE: 16 BRPM | HEART RATE: 81 BPM | OXYGEN SATURATION: 96 %

## 2024-04-05 DIAGNOSIS — D50.9 IRON DEFICIENCY ANEMIA, UNSPECIFIED IRON DEFICIENCY ANEMIA TYPE: Primary | ICD-10-CM

## 2024-04-05 PROCEDURE — 6360000002 HC RX W HCPCS: Performed by: INTERNAL MEDICINE

## 2024-04-05 PROCEDURE — 2580000003 HC RX 258: Performed by: INTERNAL MEDICINE

## 2024-04-05 PROCEDURE — 96374 THER/PROPH/DIAG INJ IV PUSH: CPT

## 2024-04-05 RX ORDER — HEPARIN 100 UNIT/ML
500 SYRINGE INTRAVENOUS PRN
OUTPATIENT
Start: 2024-04-12

## 2024-04-05 RX ORDER — SODIUM CHLORIDE 9 MG/ML
5-250 INJECTION, SOLUTION INTRAVENOUS PRN
OUTPATIENT
Start: 2024-04-12

## 2024-04-05 RX ORDER — EPINEPHRINE 1 MG/ML
0.3 INJECTION, SOLUTION, CONCENTRATE INTRAVENOUS PRN
OUTPATIENT
Start: 2024-04-12

## 2024-04-05 RX ORDER — ACETAMINOPHEN 325 MG/1
650 TABLET ORAL
OUTPATIENT
Start: 2024-04-12

## 2024-04-05 RX ORDER — ONDANSETRON 2 MG/ML
8 INJECTION INTRAMUSCULAR; INTRAVENOUS
OUTPATIENT
Start: 2024-04-12

## 2024-04-05 RX ORDER — SODIUM CHLORIDE 0.9 % (FLUSH) 0.9 %
5-40 SYRINGE (ML) INJECTION PRN
OUTPATIENT
Start: 2024-04-12

## 2024-04-05 RX ORDER — SODIUM CHLORIDE 0.9 % (FLUSH) 0.9 %
5-40 SYRINGE (ML) INJECTION PRN
Status: DISCONTINUED | OUTPATIENT
Start: 2024-04-05 | End: 2024-04-06 | Stop reason: HOSPADM

## 2024-04-05 RX ORDER — DIPHENHYDRAMINE HYDROCHLORIDE 50 MG/ML
50 INJECTION INTRAMUSCULAR; INTRAVENOUS
OUTPATIENT
Start: 2024-04-12

## 2024-04-05 RX ORDER — SODIUM CHLORIDE 9 MG/ML
5-250 INJECTION, SOLUTION INTRAVENOUS PRN
Status: DISCONTINUED | OUTPATIENT
Start: 2024-04-05 | End: 2024-04-06 | Stop reason: HOSPADM

## 2024-04-05 RX ORDER — ALBUTEROL SULFATE 90 UG/1
4 AEROSOL, METERED RESPIRATORY (INHALATION) PRN
OUTPATIENT
Start: 2024-04-12

## 2024-04-05 RX ORDER — SODIUM CHLORIDE 9 MG/ML
INJECTION, SOLUTION INTRAVENOUS CONTINUOUS
OUTPATIENT
Start: 2024-04-12

## 2024-04-05 RX ADMIN — IRON SUCROSE 200 MG: 20 INJECTION, SOLUTION INTRAVENOUS at 15:25

## 2024-04-05 RX ADMIN — SODIUM CHLORIDE 250 ML/HR: 9 INJECTION, SOLUTION INTRAVENOUS at 15:24

## 2024-04-05 NOTE — PROGRESS NOTES
Patient tolerated infusion well. Patient alert and oriented x 3 No distress noted. Vital signs stable. Patient denies any new or worsening pain. Patient denies questions regarding treatment or medication; verbalized understanding, gave patient venofer information; Patient denies needs, all questions answered.

## 2024-04-11 ENCOUNTER — HOSPITAL ENCOUNTER (OUTPATIENT)
Dept: INFUSION THERAPY | Age: 49
Discharge: HOME | End: 2024-04-11
Payer: COMMERCIAL

## 2024-04-11 ENCOUNTER — OFFICE VISIT (OUTPATIENT)
Dept: ONCOLOGY | Age: 49
End: 2024-04-11
Payer: COMMERCIAL

## 2024-04-11 VITALS
HEIGHT: 67 IN | SYSTOLIC BLOOD PRESSURE: 130 MMHG | OXYGEN SATURATION: 100 % | WEIGHT: 213.4 LBS | DIASTOLIC BLOOD PRESSURE: 93 MMHG | HEART RATE: 98 BPM | BODY MASS INDEX: 33.49 KG/M2 | TEMPERATURE: 97.7 F

## 2024-04-11 VITALS
OXYGEN SATURATION: 100 % | RESPIRATION RATE: 16 BRPM | DIASTOLIC BLOOD PRESSURE: 92 MMHG | HEART RATE: 90 BPM | SYSTOLIC BLOOD PRESSURE: 147 MMHG | TEMPERATURE: 98.4 F

## 2024-04-11 DIAGNOSIS — D50.9 IRON DEFICIENCY ANEMIA, UNSPECIFIED IRON DEFICIENCY ANEMIA TYPE: ICD-10-CM

## 2024-04-11 DIAGNOSIS — D50.9 IRON DEFICIENCY ANEMIA, UNSPECIFIED IRON DEFICIENCY ANEMIA TYPE: Primary | ICD-10-CM

## 2024-04-11 DIAGNOSIS — R63.4 WEIGHT LOSS OF MORE THAN 10% BODY WEIGHT: Primary | ICD-10-CM

## 2024-04-11 PROCEDURE — 3080F DIAST BP >= 90 MM HG: CPT | Performed by: INTERNAL MEDICINE

## 2024-04-11 PROCEDURE — 3075F SYST BP GE 130 - 139MM HG: CPT | Performed by: INTERNAL MEDICINE

## 2024-04-11 PROCEDURE — 99214 OFFICE O/P EST MOD 30 MIN: CPT | Performed by: INTERNAL MEDICINE

## 2024-04-11 PROCEDURE — 2580000003 HC RX 258: Performed by: INTERNAL MEDICINE

## 2024-04-11 PROCEDURE — 6360000002 HC RX W HCPCS: Performed by: INTERNAL MEDICINE

## 2024-04-11 PROCEDURE — 96374 THER/PROPH/DIAG INJ IV PUSH: CPT

## 2024-04-11 RX ORDER — SODIUM CHLORIDE 9 MG/ML
5-250 INJECTION, SOLUTION INTRAVENOUS PRN
Status: DISCONTINUED | OUTPATIENT
Start: 2024-04-11 | End: 2024-04-12 | Stop reason: HOSPADM

## 2024-04-11 RX ORDER — DIPHENHYDRAMINE HYDROCHLORIDE 50 MG/ML
50 INJECTION INTRAMUSCULAR; INTRAVENOUS
Status: CANCELLED | OUTPATIENT
Start: 2024-04-12

## 2024-04-11 RX ORDER — SODIUM CHLORIDE 9 MG/ML
5-250 INJECTION, SOLUTION INTRAVENOUS PRN
Status: CANCELLED | OUTPATIENT
Start: 2024-04-12

## 2024-04-11 RX ORDER — SODIUM CHLORIDE 9 MG/ML
INJECTION, SOLUTION INTRAVENOUS CONTINUOUS
Status: CANCELLED | OUTPATIENT
Start: 2024-04-12

## 2024-04-11 RX ORDER — ALBUTEROL SULFATE 90 UG/1
4 AEROSOL, METERED RESPIRATORY (INHALATION) PRN
Status: CANCELLED | OUTPATIENT
Start: 2024-04-12

## 2024-04-11 RX ORDER — HEPARIN 100 UNIT/ML
500 SYRINGE INTRAVENOUS PRN
Status: CANCELLED | OUTPATIENT
Start: 2024-04-12

## 2024-04-11 RX ORDER — SODIUM CHLORIDE 0.9 % (FLUSH) 0.9 %
5-40 SYRINGE (ML) INJECTION PRN
Status: CANCELLED | OUTPATIENT
Start: 2024-04-12

## 2024-04-11 RX ORDER — ACETAMINOPHEN 325 MG/1
650 TABLET ORAL
Status: CANCELLED | OUTPATIENT
Start: 2024-04-12

## 2024-04-11 RX ORDER — ONDANSETRON 2 MG/ML
8 INJECTION INTRAMUSCULAR; INTRAVENOUS
Status: CANCELLED | OUTPATIENT
Start: 2024-04-12

## 2024-04-11 RX ORDER — EPINEPHRINE 1 MG/ML
0.3 INJECTION, SOLUTION, CONCENTRATE INTRAVENOUS PRN
Status: CANCELLED | OUTPATIENT
Start: 2024-04-12

## 2024-04-11 RX ADMIN — IRON SUCROSE 200 MG: 20 INJECTION, SOLUTION INTRAVENOUS at 10:22

## 2024-04-11 RX ADMIN — SODIUM CHLORIDE 200 ML/HR: 9 INJECTION, SOLUTION INTRAVENOUS at 10:22

## 2024-04-11 NOTE — FLOWSHEET NOTE
Pt arrived to clinic for IV venofer and office visit. Tolerated infusion with no complications. VSS- per baseline. Pt denies any blurry vision, dizziness. Does take BP medications and monitors BP at home.

## 2024-04-11 NOTE — PROGRESS NOTES
:  Well developed, well nourished female  EYES:   HEENT: negative   RESPIRATORY: negative   CARDIOVASCULAR: negative   GASTROINTESTINAL: negative  GENITOURINARY: negative   ENDOCRINE: negative   MUSCULOSKELETAL: negative      PHYSICAL EXAM:   VITALS:  BP (!) 130/93   Pulse 98   Temp 97.7 °F (36.5 °C)   Ht 1.702 m (5' 7\")   Wt 96.8 kg (213 lb 6.4 oz)   LMP 01/18/2024   SpO2 100%   BMI 33.42 kg/m²   ECOG:  SKIN : No Rash .  EYES: PERRL; EOM intact  ENT:  oropharynx clear, no evidence of mucositis.   NECK:  No  lymphadenopathy.  Neck supple.  BREASTS : No palpable lumps or axillary nodes.  LUNGS:  Clear to auscultation and percussion.  CARDIOVASCULAR:  Regular rate and rhythm. No clicks, murmurs, rubs or gallops.  ABDOMEN:  Soft, nontender.  No ascites.  No mass or organomegaly.  EXTREMITIES: without clubbing, cyanosis, or edema.  NEUROLOGIC:  No focal deficits.      LABS:   Last 3 BMP  No results for input(s): \"NA\", \"K\", \"CL\", \"CO2\", \"BUN\", \"CREATININE\", \"GLU\", \"CALCIUM\" in the last 72 hours.      Last 3 CMP:  No results for input(s): \"NA\", \"K\", \"CL\", \"CO2\", \"BUN\", \"CREATININE\", \"GLU\", \"CALCIUM\", \"PROT\", \"LABALBU\", \"BILITOT\", \"ALKPHOS\", \"AST\", \"ALT\" in the last 72 hours.        Last 3 CBC:  No results for input(s): \"WBC\", \"RBC\", \"HGB\", \"HCT\", \"MCV\", \"MCH\", \"MCHC\", \"RDW\", \"PLT\", \"MPV\" in the last 72 hours.    IMAGING:   EXAMINATION:  MRI OF THE ABDOMEN WITH AND WITHOUT CONTRAST AND MRCP 3/25/2024 9:18 am     TECHNIQUE:  Multiplanar multisequence MRI of the abdomen was performed with and without  the administration of intravenous contrast.  After initial T2 axial and  coronal images, thick slab, thin slab and 3D coronal MRCP sequences were  obtained without the administration of intravenous contrast.  MIP images are  provided for review.     COMPARISON:  Ultrasound March 6, 2024     HISTORY:  ORDERING SYSTEM PROVIDED HISTORY: Dilated cbd, acquired  TECHNOLOGIST PROVIDED HISTORY:  STAT Creatinine as

## 2024-04-12 ENCOUNTER — TELEPHONE (OUTPATIENT)
Dept: SURGERY | Age: 49
End: 2024-04-12

## 2024-04-12 DIAGNOSIS — R10.30 LOWER ABDOMINAL PAIN: Primary | ICD-10-CM

## 2024-04-12 NOTE — TELEPHONE ENCOUNTER
Left message for pt to return call - she needs the results from her scope and also needs scheduled for a ct abd an pelvis - orders are in and phone number given to pt to call and schedule also needs to move her f/u appointment to after hr ct scan

## 2024-04-19 ENCOUNTER — HOSPITAL ENCOUNTER (OUTPATIENT)
Dept: ULTRASOUND IMAGING | Age: 49
Discharge: HOME | End: 2024-04-19
Attending: INTERNAL MEDICINE
Payer: COMMERCIAL

## 2024-04-19 ENCOUNTER — HOSPITAL ENCOUNTER (OUTPATIENT)
Dept: INFUSION THERAPY | Age: 49
Discharge: HOME | End: 2024-04-19
Payer: COMMERCIAL

## 2024-04-19 VITALS
TEMPERATURE: 98.4 F | SYSTOLIC BLOOD PRESSURE: 124 MMHG | OXYGEN SATURATION: 98 % | RESPIRATION RATE: 16 BRPM | HEART RATE: 99 BPM | DIASTOLIC BLOOD PRESSURE: 75 MMHG

## 2024-04-19 DIAGNOSIS — E04.1 THYROID NODULE: ICD-10-CM

## 2024-04-19 DIAGNOSIS — D50.9 IRON DEFICIENCY ANEMIA, UNSPECIFIED IRON DEFICIENCY ANEMIA TYPE: Primary | ICD-10-CM

## 2024-04-19 PROCEDURE — 96374 THER/PROPH/DIAG INJ IV PUSH: CPT

## 2024-04-19 PROCEDURE — 76536 US EXAM OF HEAD AND NECK: CPT

## 2024-04-19 PROCEDURE — 6360000002 HC RX W HCPCS: Performed by: INTERNAL MEDICINE

## 2024-04-19 PROCEDURE — 2580000003 HC RX 258: Performed by: INTERNAL MEDICINE

## 2024-04-19 RX ORDER — DIPHENHYDRAMINE HYDROCHLORIDE 50 MG/ML
50 INJECTION INTRAMUSCULAR; INTRAVENOUS
Status: CANCELLED | OUTPATIENT
Start: 2024-04-26

## 2024-04-19 RX ORDER — SODIUM CHLORIDE 9 MG/ML
5-250 INJECTION, SOLUTION INTRAVENOUS PRN
Status: CANCELLED | OUTPATIENT
Start: 2024-04-26

## 2024-04-19 RX ORDER — SODIUM CHLORIDE 0.9 % (FLUSH) 0.9 %
5-40 SYRINGE (ML) INJECTION PRN
Status: CANCELLED | OUTPATIENT
Start: 2024-04-26

## 2024-04-19 RX ORDER — ACETAMINOPHEN 325 MG/1
650 TABLET ORAL
Status: CANCELLED | OUTPATIENT
Start: 2024-04-26

## 2024-04-19 RX ORDER — SODIUM CHLORIDE 9 MG/ML
INJECTION, SOLUTION INTRAVENOUS CONTINUOUS
Status: CANCELLED | OUTPATIENT
Start: 2024-04-26

## 2024-04-19 RX ORDER — EPINEPHRINE 1 MG/ML
0.3 INJECTION, SOLUTION, CONCENTRATE INTRAVENOUS PRN
Status: CANCELLED | OUTPATIENT
Start: 2024-04-26

## 2024-04-19 RX ORDER — SODIUM CHLORIDE 0.9 % (FLUSH) 0.9 %
5-40 SYRINGE (ML) INJECTION PRN
Status: DISCONTINUED | OUTPATIENT
Start: 2024-04-19 | End: 2024-04-20 | Stop reason: HOSPADM

## 2024-04-19 RX ORDER — ALBUTEROL SULFATE 90 UG/1
4 AEROSOL, METERED RESPIRATORY (INHALATION) PRN
Status: CANCELLED | OUTPATIENT
Start: 2024-04-26

## 2024-04-19 RX ORDER — ONDANSETRON 2 MG/ML
8 INJECTION INTRAMUSCULAR; INTRAVENOUS
Status: CANCELLED | OUTPATIENT
Start: 2024-04-26

## 2024-04-19 RX ORDER — HEPARIN 100 UNIT/ML
500 SYRINGE INTRAVENOUS PRN
Status: CANCELLED | OUTPATIENT
Start: 2024-04-26

## 2024-04-19 RX ORDER — SODIUM CHLORIDE 9 MG/ML
5-250 INJECTION, SOLUTION INTRAVENOUS PRN
Status: DISCONTINUED | OUTPATIENT
Start: 2024-04-19 | End: 2024-04-20 | Stop reason: HOSPADM

## 2024-04-19 RX ADMIN — SODIUM CHLORIDE, PRESERVATIVE FREE 10 ML: 5 INJECTION INTRAVENOUS at 15:01

## 2024-04-19 RX ADMIN — IRON SUCROSE 200 MG: 20 INJECTION, SOLUTION INTRAVENOUS at 15:02

## 2024-04-19 RX ADMIN — SODIUM CHLORIDE 200 ML/HR: 9 INJECTION, SOLUTION INTRAVENOUS at 15:01

## 2024-04-19 NOTE — PROGRESS NOTES
Patient tolerated treatment well without complications or complaints. Alert and oriented x3. Patient aware of potential side effects and has no questions regarding treatment. Vitals stable throughout treatment. Pain assessed, patient denies any new or worsening pain. Patient left ambulatory.

## 2024-04-22 DIAGNOSIS — E04.2 MULTINODULAR GOITER: Primary | ICD-10-CM

## 2024-04-23 ENCOUNTER — TELEPHONE (OUTPATIENT)
Dept: ONCOLOGY | Age: 49
End: 2024-04-23

## 2024-04-23 DIAGNOSIS — E04.1 THYROID NODULE: Primary | ICD-10-CM

## 2024-04-23 NOTE — TELEPHONE ENCOUNTER
----- Message from Indiana Gould RN sent at 4/23/2024  3:10 PM EDT -----  Christian Trejo,  This is a Greg patient.   Thanks  ----- Message -----  From: Luisa Argueta MD  Sent: 4/23/2024   2:56 PM EDT  To: Indiana Gould RN    Called patient but she did not .  Can we let her know I placed a referral for ENT for further evaluation of the thyroid nodules    This nurse attempted to call patient. No answer. Left voicemail with the above information and office call back number.

## 2024-04-24 ENCOUNTER — HOSPITAL ENCOUNTER (OUTPATIENT)
Dept: CT IMAGING | Age: 49
Discharge: HOME | End: 2024-04-26
Attending: SURGERY
Payer: COMMERCIAL

## 2024-04-24 DIAGNOSIS — R10.30 LOWER ABDOMINAL PAIN: ICD-10-CM

## 2024-04-24 PROCEDURE — 6360000004 HC RX CONTRAST MEDICATION: Performed by: RADIOLOGY

## 2024-04-24 PROCEDURE — 74177 CT ABD & PELVIS W/CONTRAST: CPT

## 2024-04-24 PROCEDURE — 2580000003 HC RX 258: Performed by: RADIOLOGY

## 2024-04-24 RX ORDER — SODIUM CHLORIDE 0.9 % (FLUSH) 0.9 %
5-40 SYRINGE (ML) INJECTION 2 TIMES DAILY
Status: DISCONTINUED | OUTPATIENT
Start: 2024-04-24 | End: 2024-04-27 | Stop reason: HOSPADM

## 2024-04-24 RX ADMIN — IOPAMIDOL 75 ML: 755 INJECTION, SOLUTION INTRAVENOUS at 10:09

## 2024-04-24 RX ADMIN — SODIUM CHLORIDE, PRESERVATIVE FREE 10 ML: 5 INJECTION INTRAVENOUS at 10:09

## 2024-04-24 RX ADMIN — IOPAMIDOL 18 ML: 755 INJECTION, SOLUTION INTRAVENOUS at 10:09

## 2024-04-26 ENCOUNTER — HOSPITAL ENCOUNTER (OUTPATIENT)
Dept: INFUSION THERAPY | Age: 49
Discharge: HOME | End: 2024-04-26
Payer: COMMERCIAL

## 2024-04-26 VITALS
TEMPERATURE: 97.7 F | OXYGEN SATURATION: 100 % | HEART RATE: 83 BPM | RESPIRATION RATE: 16 BRPM | SYSTOLIC BLOOD PRESSURE: 138 MMHG | DIASTOLIC BLOOD PRESSURE: 83 MMHG

## 2024-04-26 DIAGNOSIS — D50.9 IRON DEFICIENCY ANEMIA, UNSPECIFIED IRON DEFICIENCY ANEMIA TYPE: Primary | ICD-10-CM

## 2024-04-26 PROCEDURE — 2580000003 HC RX 258: Performed by: INTERNAL MEDICINE

## 2024-04-26 PROCEDURE — 6360000002 HC RX W HCPCS: Performed by: INTERNAL MEDICINE

## 2024-04-26 PROCEDURE — 96374 THER/PROPH/DIAG INJ IV PUSH: CPT

## 2024-04-26 RX ORDER — EPINEPHRINE 1 MG/ML
0.3 INJECTION, SOLUTION, CONCENTRATE INTRAVENOUS PRN
Status: CANCELLED | OUTPATIENT
Start: 2024-05-03

## 2024-04-26 RX ORDER — HEPARIN 100 UNIT/ML
500 SYRINGE INTRAVENOUS PRN
Status: CANCELLED | OUTPATIENT
Start: 2024-05-03

## 2024-04-26 RX ORDER — ACETAMINOPHEN 325 MG/1
650 TABLET ORAL
Status: CANCELLED | OUTPATIENT
Start: 2024-05-03

## 2024-04-26 RX ORDER — ALBUTEROL SULFATE 90 UG/1
4 AEROSOL, METERED RESPIRATORY (INHALATION) PRN
Status: CANCELLED | OUTPATIENT
Start: 2024-05-03

## 2024-04-26 RX ORDER — SODIUM CHLORIDE 9 MG/ML
5-250 INJECTION, SOLUTION INTRAVENOUS PRN
Status: CANCELLED | OUTPATIENT
Start: 2024-05-03

## 2024-04-26 RX ORDER — DIPHENHYDRAMINE HYDROCHLORIDE 50 MG/ML
50 INJECTION INTRAMUSCULAR; INTRAVENOUS
Status: CANCELLED | OUTPATIENT
Start: 2024-05-03

## 2024-04-26 RX ORDER — SODIUM CHLORIDE 9 MG/ML
5-250 INJECTION, SOLUTION INTRAVENOUS PRN
Status: DISCONTINUED | OUTPATIENT
Start: 2024-04-26 | End: 2024-04-27 | Stop reason: HOSPADM

## 2024-04-26 RX ORDER — SODIUM CHLORIDE 9 MG/ML
INJECTION, SOLUTION INTRAVENOUS CONTINUOUS
Status: CANCELLED | OUTPATIENT
Start: 2024-05-03

## 2024-04-26 RX ORDER — SODIUM CHLORIDE 0.9 % (FLUSH) 0.9 %
5-40 SYRINGE (ML) INJECTION PRN
Status: CANCELLED | OUTPATIENT
Start: 2024-05-03

## 2024-04-26 RX ORDER — SODIUM CHLORIDE 0.9 % (FLUSH) 0.9 %
5-40 SYRINGE (ML) INJECTION PRN
Status: DISCONTINUED | OUTPATIENT
Start: 2024-04-26 | End: 2024-04-27 | Stop reason: HOSPADM

## 2024-04-26 RX ORDER — ONDANSETRON 2 MG/ML
8 INJECTION INTRAMUSCULAR; INTRAVENOUS
Status: CANCELLED | OUTPATIENT
Start: 2024-05-03

## 2024-04-26 RX ADMIN — SODIUM CHLORIDE 250 ML/HR: 9 INJECTION, SOLUTION INTRAVENOUS at 14:50

## 2024-04-26 RX ADMIN — SODIUM CHLORIDE, PRESERVATIVE FREE 10 ML: 5 INJECTION INTRAVENOUS at 14:50

## 2024-04-26 RX ADMIN — IRON SUCROSE 200 MG: 20 INJECTION, SOLUTION INTRAVENOUS at 14:50

## 2024-05-02 ENCOUNTER — OFFICE VISIT (OUTPATIENT)
Dept: SURGERY | Age: 49
End: 2024-05-02
Payer: COMMERCIAL

## 2024-05-02 ENCOUNTER — OFFICE VISIT (OUTPATIENT)
Dept: OBGYN | Age: 49
End: 2024-05-02
Payer: COMMERCIAL

## 2024-05-02 VITALS
DIASTOLIC BLOOD PRESSURE: 69 MMHG | BODY MASS INDEX: 33.13 KG/M2 | SYSTOLIC BLOOD PRESSURE: 128 MMHG | WEIGHT: 211.1 LBS | HEIGHT: 67 IN | HEART RATE: 102 BPM

## 2024-05-02 VITALS
HEIGHT: 67 IN | DIASTOLIC BLOOD PRESSURE: 80 MMHG | SYSTOLIC BLOOD PRESSURE: 135 MMHG | BODY MASS INDEX: 32.96 KG/M2 | TEMPERATURE: 97.9 F | HEART RATE: 98 BPM | WEIGHT: 210 LBS

## 2024-05-02 DIAGNOSIS — R63.4 WEIGHT LOSS: ICD-10-CM

## 2024-05-02 DIAGNOSIS — N96 HISTORY OF RECURRENT MISCARRIAGES: ICD-10-CM

## 2024-05-02 DIAGNOSIS — N93.9 ABNORMAL UTERINE BLEEDING (AUB): Primary | ICD-10-CM

## 2024-05-02 DIAGNOSIS — K21.9 GASTROESOPHAGEAL REFLUX DISEASE WITHOUT ESOPHAGITIS: Primary | ICD-10-CM

## 2024-05-02 DIAGNOSIS — Z12.4 SCREENING FOR CERVICAL CANCER: ICD-10-CM

## 2024-05-02 DIAGNOSIS — K58.0 IRRITABLE BOWEL SYNDROME WITH DIARRHEA: ICD-10-CM

## 2024-05-02 DIAGNOSIS — K76.9 LIVER LESION: Primary | ICD-10-CM

## 2024-05-02 DIAGNOSIS — D50.9 IRON DEFICIENCY ANEMIA, UNSPECIFIED IRON DEFICIENCY ANEMIA TYPE: ICD-10-CM

## 2024-05-02 PROCEDURE — 3078F DIAST BP <80 MM HG: CPT | Performed by: OBSTETRICS & GYNECOLOGY

## 2024-05-02 PROCEDURE — 3074F SYST BP LT 130 MM HG: CPT | Performed by: OBSTETRICS & GYNECOLOGY

## 2024-05-02 PROCEDURE — 3079F DIAST BP 80-89 MM HG: CPT | Performed by: SURGERY

## 2024-05-02 PROCEDURE — 99203 OFFICE O/P NEW LOW 30 MIN: CPT | Performed by: OBSTETRICS & GYNECOLOGY

## 2024-05-02 PROCEDURE — 99214 OFFICE O/P EST MOD 30 MIN: CPT | Performed by: SURGERY

## 2024-05-02 PROCEDURE — 3075F SYST BP GE 130 - 139MM HG: CPT | Performed by: SURGERY

## 2024-05-02 PROCEDURE — 99204 OFFICE O/P NEW MOD 45 MIN: CPT | Performed by: OBSTETRICS & GYNECOLOGY

## 2024-05-02 NOTE — PROGRESS NOTES
HISTORY OF PRESENT ILLNESS:    49 y.o. female   presents with complaint of weight loss since 2024. Has lost 58 lbs without trying.     Periods are irregular. They are either very light or long and heavy. Periods are q 1-3 months. Bleeding when heavy causes her to change pad q 2 hours. +Clots. History of PCOS. Not currently on contraception. +Sexually active.     +tobacco    Recently had EGD and colonoscopy that were normal. Pt has thyroid biopsy tomorrow. Has 3 masses.     Pelvic US 3/1/24-uterus 11 cm, ES 4-5 mm, normal ovaries  3/6/24 normal mammo  24 CT b/l 2 cm ovarian cysts    Past Medical History:   Past Medical History:   Diagnosis Date    Diabetes mellitus (HCC)     Fatty liver disease, nonalcoholic     GERD (gastroesophageal reflux disease)     Migraine     +Aura    PCOS (polycystic ovarian syndrome)     Post partum depression                                              OB History    Para Term  AB Living   4 1 1   3 1   SAB IAB Ectopic Molar Multiple Live Births   3         1      # Outcome Date GA Lbr Jasbir/2nd Weight Sex Delivery Anes PTL Lv   4 SAB            3 Term      CS-LTranv   BILLY   2 SAB            1 SAB                  Past Surgical History:   Past Surgical History:   Procedure Laterality Date     SECTION      x1    COLONOSCOPY N/A 2024    COLONOSCOPY BIOPSY performed by Pily Whittaker MD at [facility] ENDOSCOPY    DILATION AND CURETTAGE OF UTERUS      SAB    TONSILLECTOMY      UPPER GASTROINTESTINAL ENDOSCOPY N/A 2024    ESOPHAGOGASTRODUODENOSCOPY BIOPSY performed by Pily Whittaker MD at [facility] ENDOSCOPY        Allergies: Patient has no known allergies.     Medications:   Current Outpatient Medications   Medication Sig Dispense Refill    insulin lispro (HUMALOG) 100 UNIT/ML SOLN injection vial INJECT SUBCUTANEOUSLY VIA  INSULIN PUMP MAX 75 UNITS  PER DAY 70 mL 5    metFORMIN (GLUCOPHAGE) 1000 MG tablet TAKE 1 TABLET BY MOUTH

## 2024-05-02 NOTE — PROGRESS NOTES
New patient here to establish care. Here for DUB. Previous patient of Dr Rendon in [place]. Will sign a medical release to get records. Reports extreme weight loss since October 2023. Has not had care for about 8 years. Found out there is HX of cervical cancer on maternal side. Pap obtained, labeled and sent to lab. Discharge instructions given to patient. Advised patient to call office if any concerns. Patient voiced understanding

## 2024-05-02 NOTE — PROGRESS NOTES
Abnormally dilated common bile duct at 8 mm.  3. Borderline splenomegaly.     MRCP:  IMPRESSION:  1. No acute findings.  No evidence of biliary obstruction.  2. Subcentimeter T2 hyperintense lesion is seen involving the right lobe of  the liver too small for accurate characterization but does appear to enhance on the postcontrast series.  Finding is too small for accurate  characterization.  Attention on follow-up is recommended.    CT abd pelvis:  IMPRESSION:  1. No acute intra-abdominal or pelvic abnormality.  2. Large amount of retained stool.  3. Bilateral adnexal cysts measuring up to 2.2 cm.  4. 8 mm low attenuation focus in the liver mentioned in prior abdominal MRI.    Impression/Plan:  49 y.o. year old female with :    All available labs and pathology reviewed.  All imaging independently reviewed and interpreted.   All provider notes reviewed.  The above was discussed with the patient at length.    Heartburn  Diarrhea  Weight loss  GERD  Hiatal hernia  - Continue Dexilant  - GERD diet     Normal appearing colon and TI - Repeat colonoscopy in 10 years.    Constipation:  - High fiber diet - 20-25 grams of fiber a day  - Miralax 1 cap daily  - Can add Magnesium oxide 400 mg PO daily  - Increase water intake  - Provided laxative handout with recommendations    Refer to Dr. Romero for liver lesions    Thyroid workup per PCP.    Electronically by Pily Whittaker MD, General Surgery  on 5/2/2024 at 12:09 PM      Send copy of H&P to PCP, Ellis Fisher DO and referring physician, Ellis Fisher DO      5/2/2024

## 2024-05-03 ENCOUNTER — HOSPITAL ENCOUNTER (OUTPATIENT)
Dept: INFUSION THERAPY | Age: 49
Discharge: HOME | End: 2024-05-03
Payer: COMMERCIAL

## 2024-05-03 ENCOUNTER — HOSPITAL ENCOUNTER (OUTPATIENT)
Dept: ULTRASOUND IMAGING | Age: 49
End: 2024-05-03
Attending: STUDENT IN AN ORGANIZED HEALTH CARE EDUCATION/TRAINING PROGRAM
Payer: COMMERCIAL

## 2024-05-03 VITALS
TEMPERATURE: 98.3 F | SYSTOLIC BLOOD PRESSURE: 131 MMHG | RESPIRATION RATE: 16 BRPM | OXYGEN SATURATION: 97 % | HEART RATE: 84 BPM | DIASTOLIC BLOOD PRESSURE: 91 MMHG

## 2024-05-03 DIAGNOSIS — D50.9 IRON DEFICIENCY ANEMIA, UNSPECIFIED IRON DEFICIENCY ANEMIA TYPE: Primary | ICD-10-CM

## 2024-05-03 DIAGNOSIS — E04.2 MULTINODULAR GOITER: ICD-10-CM

## 2024-05-03 PROCEDURE — 10005 FNA BX W/US GDN 1ST LES: CPT

## 2024-05-03 PROCEDURE — 10006 FNA BX W/US GDN EA ADDL: CPT

## 2024-05-03 PROCEDURE — 96374 THER/PROPH/DIAG INJ IV PUSH: CPT

## 2024-05-03 PROCEDURE — 88305 TISSUE EXAM BY PATHOLOGIST: CPT

## 2024-05-03 PROCEDURE — 6360000002 HC RX W HCPCS: Performed by: INTERNAL MEDICINE

## 2024-05-03 PROCEDURE — 88173 CYTOPATH EVAL FNA REPORT: CPT

## 2024-05-03 PROCEDURE — 2580000003 HC RX 258: Performed by: INTERNAL MEDICINE

## 2024-05-03 PROCEDURE — 88172 CYTP DX EVAL FNA 1ST EA SITE: CPT

## 2024-05-03 RX ORDER — SODIUM CHLORIDE 9 MG/ML
INJECTION, SOLUTION INTRAVENOUS CONTINUOUS
OUTPATIENT
Start: 2024-05-03

## 2024-05-03 RX ORDER — EPINEPHRINE 1 MG/ML
0.3 INJECTION, SOLUTION, CONCENTRATE INTRAVENOUS PRN
OUTPATIENT
Start: 2024-05-03

## 2024-05-03 RX ORDER — ONDANSETRON 2 MG/ML
8 INJECTION INTRAMUSCULAR; INTRAVENOUS
OUTPATIENT
Start: 2024-05-03

## 2024-05-03 RX ORDER — SODIUM CHLORIDE 0.9 % (FLUSH) 0.9 %
5-40 SYRINGE (ML) INJECTION PRN
OUTPATIENT
Start: 2024-05-03

## 2024-05-03 RX ORDER — HEPARIN 100 UNIT/ML
500 SYRINGE INTRAVENOUS PRN
OUTPATIENT
Start: 2024-05-03

## 2024-05-03 RX ORDER — SODIUM CHLORIDE 9 MG/ML
5-250 INJECTION, SOLUTION INTRAVENOUS PRN
OUTPATIENT
Start: 2024-05-03

## 2024-05-03 RX ORDER — SODIUM CHLORIDE 9 MG/ML
5-250 INJECTION, SOLUTION INTRAVENOUS PRN
Status: DISCONTINUED | OUTPATIENT
Start: 2024-05-03 | End: 2024-05-04 | Stop reason: HOSPADM

## 2024-05-03 RX ORDER — DIPHENHYDRAMINE HYDROCHLORIDE 50 MG/ML
50 INJECTION INTRAMUSCULAR; INTRAVENOUS
OUTPATIENT
Start: 2024-05-03

## 2024-05-03 RX ORDER — ACETAMINOPHEN 325 MG/1
650 TABLET ORAL
OUTPATIENT
Start: 2024-05-03

## 2024-05-03 RX ORDER — ALBUTEROL SULFATE 90 UG/1
4 AEROSOL, METERED RESPIRATORY (INHALATION) PRN
OUTPATIENT
Start: 2024-05-03

## 2024-05-03 RX ADMIN — IRON SUCROSE 200 MG: 20 INJECTION, SOLUTION INTRAVENOUS at 15:13

## 2024-05-03 RX ADMIN — SODIUM CHLORIDE 20 ML/HR: 9 INJECTION, SOLUTION INTRAVENOUS at 15:09

## 2024-05-03 ASSESSMENT — PAIN DESCRIPTION - PAIN TYPE: TYPE: SURGICAL PAIN

## 2024-05-03 ASSESSMENT — PAIN DESCRIPTION - DESCRIPTORS: DESCRIPTORS: SORE

## 2024-05-03 ASSESSMENT — PAIN SCALES - GENERAL: PAINLEVEL_OUTOF10: 3

## 2024-05-03 ASSESSMENT — PAIN DESCRIPTION - LOCATION: LOCATION: NECK

## 2024-05-06 ENCOUNTER — TELEPHONE (OUTPATIENT)
Dept: OTOLARYNGOLOGY | Age: 49
End: 2024-05-06

## 2024-05-06 LAB — NON-GYN CYTOLOGY REPORT: NORMAL

## 2024-05-06 NOTE — TELEPHONE ENCOUNTER
I called and spoke with Bailee nAdrews today regarding her recent thyroid FNA biopsy results. The right thyroid nodule biopsy was consistent with a Le Roy class 2 nodule, and the left thyroid nodule biopsy was consistent with a Le Roy class 4 nodule. We discussed that the Le Roy 4 nodule carries an overall risk of thyroid malignancy between 15-40% (based on aggregation of the Le Roy 2009 and Le Roy 2017 data). Also discussed that the biopsy specimen has been sent for Affirma molecular testing which should provide a better estimate of the risk of malignancy.     Confirmed that the patient does have a follow-up appointment with me scheduled, where we will discuss these results further. Final plan regarding management will be developed pending molecular testing.    Hayder Peace DO 05/06/24 4:23 PM

## 2024-05-07 LAB
HPV SAMPLE: NORMAL
HPV SOURCE: NORMAL
HPV, GENOTYPE 16: NOT DETECTED
HPV, GENOTYPE 18: NOT DETECTED
HPV, HIGH RISK OTHER: NOT DETECTED
HPV, INTERPRETATION: NORMAL

## 2024-05-08 ENCOUNTER — TELEPHONE (OUTPATIENT)
Dept: HEMATOLOGY | Age: 49
End: 2024-05-08

## 2024-05-08 ENCOUNTER — E-VISIT (OUTPATIENT)
Dept: PRIMARY CARE CLINIC | Age: 49
End: 2024-05-08
Payer: COMMERCIAL

## 2024-05-08 DIAGNOSIS — J30.2 SEASONAL ALLERGIES: Primary | ICD-10-CM

## 2024-05-08 LAB
CHLAMYDIA BY THIN PREP: NEGATIVE
GYNECOLOGY CYTOLOGY REPORT: NORMAL
N. GONORRHOEAE DNA, THIN PREP: NEGATIVE

## 2024-05-08 PROCEDURE — 99422 OL DIG E/M SVC 11-20 MIN: CPT | Performed by: FAMILY MEDICINE

## 2024-05-08 RX ORDER — PREDNISONE 10 MG/1
TABLET ORAL
Qty: 18 TABLET | Refills: 0 | Status: SHIPPED | OUTPATIENT
Start: 2024-05-08

## 2024-05-08 NOTE — TELEPHONE ENCOUNTER
I called and spoke with the patient. She stated that at this time she feels she wants to hold off on scheduling this referral. She just had a biopsy of her thyroid done and has follow up for that next week. She wants to wait for answers on what her next steps with that is going to be. She said she also spoke with Dr Whittaker too. I told the patient that I would close out this referral and when she is ready to schedule she can reach out to our office at anytime to do that. The patient confirmed all of the above and at this time all questions were answered  Electronically signed by Melanie Lawton MA on 5/8/2024 at 3:12 PM

## 2024-05-17 ENCOUNTER — HOSPITAL ENCOUNTER (OUTPATIENT)
Dept: ULTRASOUND IMAGING | Age: 49
Discharge: HOME | End: 2024-05-17
Payer: COMMERCIAL

## 2024-05-17 DIAGNOSIS — N93.9 ABNORMAL UTERINE BLEEDING (AUB): ICD-10-CM

## 2024-05-17 PROCEDURE — 76830 TRANSVAGINAL US NON-OB: CPT

## 2024-05-22 ENCOUNTER — TELEPHONE (OUTPATIENT)
Dept: ENDOCRINOLOGY | Age: 49
End: 2024-05-22

## 2024-05-22 DIAGNOSIS — N93.9 ABNORMAL UTERINE BLEEDING (AUB): ICD-10-CM

## 2024-05-22 LAB
ESTRADIOL LEVEL: 38.9 PG/ML
FOLLICLE STIMULATING HORMONE: 17.2 MIU/ML
GLUCOSE BLD-MCNC: 143 MG/DL (ref 74–99)
HCT VFR BLD CALC: 43.3 % (ref 34–48)
HEMOGLOBIN: 13.6 G/DL (ref 11.5–15.5)
LH: 8.3 MIU/ML
MCH RBC QN AUTO: 27 PG (ref 26–35)
MCHC RBC AUTO-ENTMCNC: 31.4 G/DL (ref 32–34.5)
MCV RBC AUTO: 85.9 FL (ref 80–99.9)
PDW BLD-RTO: 23.3 % (ref 11.5–15)
PLATELET, FLUORESCENCE: 268 K/UL (ref 130–450)
PMV BLD AUTO: 13.2 FL (ref 7–12)
PREGNANCY, SERUM: NEGATIVE
PROLACTIN: 11.42 NG/ML
RBC # BLD: 5.04 M/UL (ref 3.5–5.5)
TESTOSTERONE TOTAL: <12 NG/DL (ref 8–48)
TSH SERPL DL<=0.05 MIU/L-ACNC: 0.83 UIU/ML (ref 0.27–4.2)
WBC # BLD: 8.6 K/UL (ref 4.5–11.5)

## 2024-05-22 NOTE — TELEPHONE ENCOUNTER
Dr. Hayder Peace's office sent over a Pre Op form to have pt seen right away. Pt is scheduled for surgery on 06/21. Form is in Media. Please contact pt.

## 2024-05-24 LAB
DHEAS (DHEA SULFATE): 110 UG/DL (ref 35.4–256)
INSULIN COMMENT: NORMAL
INSULIN REFERENCE RANGE:: NORMAL
INSULIN: 8.4 MU/L

## 2024-05-27 LAB — 17 OH PROGESTERONE: 11.39 NG/DL

## 2024-05-30 ENCOUNTER — OFFICE VISIT (OUTPATIENT)
Dept: ENDOCRINOLOGY | Age: 49
End: 2024-05-30

## 2024-05-30 ENCOUNTER — PROCEDURE VISIT (OUTPATIENT)
Dept: OBGYN | Age: 49
End: 2024-05-30
Payer: COMMERCIAL

## 2024-05-30 VITALS
SYSTOLIC BLOOD PRESSURE: 138 MMHG | WEIGHT: 214.6 LBS | BODY MASS INDEX: 33.61 KG/M2 | DIASTOLIC BLOOD PRESSURE: 87 MMHG | HEART RATE: 83 BPM

## 2024-05-30 VITALS
OXYGEN SATURATION: 99 % | HEART RATE: 83 BPM | HEIGHT: 67 IN | BODY MASS INDEX: 33.59 KG/M2 | DIASTOLIC BLOOD PRESSURE: 85 MMHG | WEIGHT: 214 LBS | SYSTOLIC BLOOD PRESSURE: 134 MMHG

## 2024-05-30 DIAGNOSIS — E11.29 TYPE 2 DIABETES MELLITUS WITH MICROALBUMINURIA, WITH LONG-TERM CURRENT USE OF INSULIN (HCC): Primary | ICD-10-CM

## 2024-05-30 DIAGNOSIS — R80.9 TYPE 2 DIABETES MELLITUS WITH MICROALBUMINURIA, WITH LONG-TERM CURRENT USE OF INSULIN (HCC): Primary | ICD-10-CM

## 2024-05-30 DIAGNOSIS — E66.09 CLASS 1 OBESITY DUE TO EXCESS CALORIES WITHOUT SERIOUS COMORBIDITY WITH BODY MASS INDEX (BMI) OF 33.0 TO 33.9 IN ADULT: ICD-10-CM

## 2024-05-30 DIAGNOSIS — Z79.4 TYPE 2 DIABETES MELLITUS WITH MICROALBUMINURIA, WITH LONG-TERM CURRENT USE OF INSULIN (HCC): Primary | ICD-10-CM

## 2024-05-30 DIAGNOSIS — E55.9 VITAMIN D DEFICIENCY: ICD-10-CM

## 2024-05-30 DIAGNOSIS — E78.5 HYPERLIPIDEMIA, UNSPECIFIED HYPERLIPIDEMIA TYPE: ICD-10-CM

## 2024-05-30 DIAGNOSIS — N93.9 ABNORMAL UTERINE BLEEDING (AUB): ICD-10-CM

## 2024-05-30 DIAGNOSIS — E04.2 MULTIPLE THYROID NODULES: ICD-10-CM

## 2024-05-30 DIAGNOSIS — N93.8 DUB (DYSFUNCTIONAL UTERINE BLEEDING): Primary | ICD-10-CM

## 2024-05-30 LAB
CONTROL: NORMAL
HBA1C MFR BLD: 6.1 %
PREGNANCY TEST URINE, POC: NEGATIVE

## 2024-05-30 PROCEDURE — 58100 BIOPSY OF UTERUS LINING: CPT | Performed by: OBSTETRICS & GYNECOLOGY

## 2024-05-30 PROCEDURE — 99214 OFFICE O/P EST MOD 30 MIN: CPT | Performed by: OBSTETRICS & GYNECOLOGY

## 2024-05-30 PROCEDURE — 81025 URINE PREGNANCY TEST: CPT | Performed by: OBSTETRICS & GYNECOLOGY

## 2024-05-30 NOTE — PROGRESS NOTES
[facility] [place] [facility] Department of Endocrinology Diabetes and Metabolism   [address] [place] [id number]   Phone: [phone]  Fax: [phone]    Date of Service: 5/30/2024    Primary Care Physician: Ellis Fisher DO  Referring physician: No ref. provider found  Provider: TONI Hurt - CNS     Reason for the visit:  Type 2 DM     History of Present Illness:  The history is provided by the patient. No  was used. Accuracy of the patient data is excellent.  Bailee Andrews is a very pleasant 49 y.o. female seen today for diabetes management     Bailee Andrews was diagnosed with diabetes early 30s. Started as gestational diabetes and she is currently on 770g Medtronic insulin pump with CGM   On Metformin 1000 mg BID   Victoza 1.8 mg daily   Current pump settings: Basal rate 0.7, CR 12a 11, 10a 12, 1p 12, ISF 56, goal 100-150, active insulin time 3 hrs   Ambulatory continuous glucose monitoring of interstitial tissue fluid via a subcutaneous sensor for a minimum of 72 hours; analysis, interpretation and report  Average sensor glucose 126  Time in range 97%  Hyperglycemia 2%  Hypoglycemia 1%    Lab Results   Component Value Date/Time    LABA1C 6.1 05/30/2024 09:48 AM    LABA1C 6.5 02/21/2024 09:42 AM    LABA1C 7.3 04/26/2023 08:19 AM     Patient has had no hypoglycemic episodes   Very good with following diabetes diet and encouraged   I reviewed current medications and the patient has no issues with diabetes medications  Bailee Andrews is up to date with eye exam and denied any history of diabetic retinopathy, retinopathy   The patient performs her own feet care and doesn't see podiatry   Microvascular complications:  No Retinopathy, Nephropathy or Neuropathy   Macrovascular complications: no CAD, PVD, or Stroke  On statin      PAST MEDICAL HISTORY   Past Medical History:   Diagnosis Date    Diabetes mellitus (HCC)     Fatty liver disease, nonalcoholic     GERD

## 2024-05-30 NOTE — PROGRESS NOTES
Endometrial biopsy Procedure Note    Bailee Andrews    2024               Patient's last menstrual period was 2024.     49 y.o.     Endometrial biopsy    Indications:  AUB  49 y.o. female   presents with complaint of weight loss since 2024. Has lost 58 lbs without trying.      Periods are irregular. They are either very light or long and heavy. Periods are q 1-3 months. Bleeding when heavy causes her to change pad q 2 hours. +Clots. History of PCOS. Not currently on contraception. +Sexually active.      +tobacco     Recently had EGD and colonoscopy that were normal. Pt has thyroid biopsy tomorrow. Has 3 masses.      Pelvic US 3/1/24-uterus 11 cm, ES 4-5 mm, normal ovaries  3/6/24 normal mammo  24 CT b/l 2 cm ovarian cysts    FSH 17  US uterus 7.7 cm, ES 8.9 mm  Pap normal cytology, neg HPV    Pt has 2 nodules on PET scan on thyroid. Pt is going to for surgery 24 for left thyroidectomy.     Anesthesia:  None    Pregnancy test: negative     Procedural Technique:  Bailee is here for an endometrial biopsy. Risks and benefits discussed and consents signed.       Endometrial biopsy:    The patient was positioned comfortably on the exam table in the dorsal lithotomy position. Speculum was placed in the vagina. The cervix was visualized, cleansed with betadine, and grasped w/ a single tooth tenaculum. Endometrial biopsy was performed with the [place].  Tissue was obtained and sent to pathology. Adequate tissue noted in the container. Excellent hemostasis was noted. The patient tolerated the procedure well.       Complications:  None.    Assessment:   Diagnosis Orders   1. DUB (dysfunctional uterine bleeding)  POCT urine pregnancy      2. Abnormal uterine bleeding (AUB)  Surgical Pathology            Recommendations:  The patient will return for follow-up in 6 weeks for results review and IUD placement.   Patient to call for any heavy bleeding, severe pain, or fevers.

## 2024-05-30 NOTE — PROGRESS NOTES
Patient here today for EMB  Consents signed and time out done.  Pregnancy test done with negative results. Procedure done by Dr. Thomason. See providers notes. Patient left in stable condition. Discharge instructions given and patient instructed to call the office if any questions and or concerns. Specimen obtained, labeled and sent to lab.

## 2024-06-04 ENCOUNTER — TELEPHONE (OUTPATIENT)
Dept: OBGYN | Age: 49
End: 2024-06-04

## 2024-06-04 NOTE — TELEPHONE ENCOUNTER
----- Message from Sybil Thomason, DO sent at 5/28/2024 12:01 PM EDT -----  Regarding: orders  Please let pt know I put in some additional labs for her to get due to her history of recurrent miscarriages. Thank you.

## 2024-06-07 LAB — SURGICAL PATHOLOGY REPORT: NORMAL

## 2024-06-09 DIAGNOSIS — E11.9 TYPE 2 DIABETES MELLITUS WITHOUT COMPLICATION, WITHOUT LONG-TERM CURRENT USE OF INSULIN (HCC): ICD-10-CM

## 2024-06-10 RX ORDER — LIRAGLUTIDE 6 MG/ML
1.8 INJECTION SUBCUTANEOUS DAILY
Qty: 9 ADJUSTABLE DOSE PRE-FILLED PEN SYRINGE | Refills: 3 | Status: SHIPPED | OUTPATIENT
Start: 2024-06-10

## 2024-06-12 ENCOUNTER — HOSPITAL ENCOUNTER (OUTPATIENT)
Age: 49
Discharge: HOME | End: 2024-06-12
Payer: COMMERCIAL

## 2024-06-12 DIAGNOSIS — D50.9 IRON DEFICIENCY ANEMIA, UNSPECIFIED IRON DEFICIENCY ANEMIA TYPE: ICD-10-CM

## 2024-06-12 DIAGNOSIS — R63.4 WEIGHT LOSS OF MORE THAN 10% BODY WEIGHT: ICD-10-CM

## 2024-06-12 DIAGNOSIS — N96 HISTORY OF RECURRENT MISCARRIAGES: ICD-10-CM

## 2024-06-12 LAB
ALBUMIN SERPL-MCNC: 4.7 G/DL (ref 3.5–5.2)
ALP SERPL-CCNC: 64 U/L (ref 35–104)
ALT SERPL-CCNC: 17 U/L (ref 0–32)
ANA SER QL IA: NEGATIVE
ANION GAP SERPL CALCULATED.3IONS-SCNC: 11 MMOL/L (ref 7–16)
AST SERPL-CCNC: 14 U/L (ref 0–31)
BASOPHILS # BLD: 0.04 K/UL (ref 0–0.2)
BASOPHILS NFR BLD: 0 % (ref 0–2)
BILIRUB SERPL-MCNC: 0.3 MG/DL (ref 0–1.2)
BUN SERPL-MCNC: 12 MG/DL (ref 6–20)
CALCIUM SERPL-MCNC: 9.3 MG/DL (ref 8.6–10.2)
CHLORIDE SERPL-SCNC: 104 MMOL/L (ref 98–107)
CO2 SERPL-SCNC: 23 MMOL/L (ref 22–29)
CREAT SERPL-MCNC: 0.8 MG/DL (ref 0.5–1)
EOSINOPHIL # BLD: 0.12 K/UL (ref 0.05–0.5)
EOSINOPHILS RELATIVE PERCENT: 1 % (ref 0–6)
ERYTHROCYTE [DISTWIDTH] IN BLOOD BY AUTOMATED COUNT: 21.2 % (ref 11.5–15)
FERRITIN SERPL-MCNC: 67 NG/ML
GFR, ESTIMATED: >90 ML/MIN/1.73M2
GLUCOSE SERPL-MCNC: 135 MG/DL (ref 74–99)
HCT VFR BLD AUTO: 45 % (ref 34–48)
HGB BLD-MCNC: 14.3 G/DL (ref 11.5–15.5)
IMM GRANULOCYTES # BLD AUTO: <0.03 K/UL (ref 0–0.58)
IMM GRANULOCYTES NFR BLD: 0 % (ref 0–5)
IRON SATN MFR SERPL: 31 % (ref 15–50)
IRON SERPL-MCNC: 108 UG/DL (ref 37–145)
LYMPHOCYTES NFR BLD: 2.43 K/UL (ref 1.5–4)
LYMPHOCYTES RELATIVE PERCENT: 27 % (ref 20–42)
MCH RBC QN AUTO: 27.4 PG (ref 26–35)
MCHC RBC AUTO-ENTMCNC: 31.8 G/DL (ref 32–34.5)
MCV RBC AUTO: 86.4 FL (ref 80–99.9)
MONOCYTES NFR BLD: 0.5 K/UL (ref 0.1–0.95)
MONOCYTES NFR BLD: 6 % (ref 2–12)
NEUTROPHILS NFR BLD: 66 % (ref 43–80)
NEUTS SEG NFR BLD: 6.01 K/UL (ref 1.8–7.3)
PLATELET # BLD AUTO: 253 K/UL (ref 130–450)
PMV BLD AUTO: 11.5 FL (ref 7–12)
POTASSIUM SERPL-SCNC: 4.1 MMOL/L (ref 3.5–5)
PROT SERPL-MCNC: 7.5 G/DL (ref 6.4–8.3)
RBC # BLD AUTO: 5.21 M/UL (ref 3.5–5.5)
RBC # BLD: ABNORMAL 10*6/UL
SODIUM SERPL-SCNC: 138 MMOL/L (ref 132–146)
TIBC SERPL-MCNC: 344 UG/DL (ref 250–450)
WBC OTHER # BLD: 9.1 K/UL (ref 4.5–11.5)

## 2024-06-12 PROCEDURE — 86038 ANTINUCLEAR ANTIBODIES: CPT

## 2024-06-12 PROCEDURE — 83540 ASSAY OF IRON: CPT

## 2024-06-12 PROCEDURE — 85306 CLOT INHIBIT PROT S FREE: CPT

## 2024-06-12 PROCEDURE — 81241 F5 GENE: CPT

## 2024-06-12 PROCEDURE — 81240 F2 GENE: CPT

## 2024-06-12 PROCEDURE — 36415 COLL VENOUS BLD VENIPUNCTURE: CPT

## 2024-06-12 PROCEDURE — 86146 BETA-2 GLYCOPROTEIN ANTIBODY: CPT

## 2024-06-12 PROCEDURE — 83550 IRON BINDING TEST: CPT

## 2024-06-12 PROCEDURE — 82728 ASSAY OF FERRITIN: CPT

## 2024-06-12 PROCEDURE — 86147 CARDIOLIPIN ANTIBODY EA IG: CPT

## 2024-06-12 PROCEDURE — 85613 RUSSELL VIPER VENOM DILUTED: CPT

## 2024-06-12 PROCEDURE — 85025 COMPLETE CBC W/AUTO DIFF WBC: CPT

## 2024-06-12 PROCEDURE — 80053 COMPREHEN METABOLIC PANEL: CPT

## 2024-06-12 PROCEDURE — 85303 CLOT INHIBIT PROT C ACTIVITY: CPT

## 2024-06-12 PROCEDURE — 86039 ANTINUCLEAR ANTIBODIES (ANA): CPT

## 2024-06-12 PROCEDURE — 85300 ANTITHROMBIN III ACTIVITY: CPT

## 2024-06-13 LAB
AT III ACT/NOR PPP CHRO: 117 % (ref 83–121)
DILUTE RUSSELL VIPER VENOM TIME: NEGATIVE
PROTEIN C ACTIVITY: 94 % (ref 68–165)

## 2024-06-17 LAB
B2 GLYCOPROT1 IGG SERPL IA-ACNC: <0.6 ELISA U/ML (ref 0–7)
B2 GLYCOPROT1 IGM SERPL IA-ACNC: 2.7 ELISA U/ML (ref 0–7)
CARDIOLIPIN IGA SER IA-ACNC: 1.5 APL (ref 0–14)
CARDIOLIPIN IGG SER IA-ACNC: <0.5 GPL (ref 0–10)
CARDIOLIPIN IGM SER IA-ACNC: 3.2 MPL (ref 0–10)
F2 C.20210G>A GENO BLD/T: NEGATIVE
F5 P.R506Q BLD/T QL: NEGATIVE
SPECIMEN SOURCE: NORMAL
SPECIMEN SOURCE: NORMAL

## 2024-06-18 NOTE — PROGRESS NOTES
[facility] PRE-ADMISSION TESTING INSTRUCTIONS    The Preadmission Testing patient is instructed accordingly using the following criteria (check applicable):    ARRIVAL INSTRUCTIONS:  [x] Parking the day of Surgery is located in the Main Entrance lot.  Upon entering the door, make an immediate right to the surgery reception desk    [x] Bring photo ID and insurance card    [x] Bring in a copy of Living will or Durable Power of  papers.    [x] Please be sure to arrange for a responsible adult to provide transportation to and from the hospital    [x] Please arrange for a responsible adult to be with you for the 24 hour period post procedure due to having anesthesia    [x] If you awake am of surgery not feeling well or have temperature >100 please call [phone]    GENERAL INSTRUCTIONS:    [x] No solid foods after midnight, may have up to 8oz of water until 4 hours prior to surgery. No gum, no candy, no mints. NPO time: 0900       [x] You may brush your teeth, do not swallow any toothpaste    [x] Take medications as instructed     [x] Stop herbal supplements and vitamins 5 days prior to procedure    [x] Follow preop dosing of blood thinners per physician instructions    [] Take 1/2 dose of evening insulin, but no insulin after midnight    [] No oral diabetic medications after midnight    [x] If diabetic and have low blood sugar or feel symptomatic, take 1-2oz apple juice only    [] Bring inhalers day of surgery    [] Bring urine specimen day of surgery    [x] Shower or bath with soap, lather and rinse well, AM of Surgery, no lotion, powders or creams to surgical site    [] Follow bowel prep as instructed per surgeon    [x] No tobacco products within 24 hours of surgery     [x] No alcohol or illegal drug use, marijuana included, within 24 hours of surgery.    [x] Jewelry, body piercing's, eyeglasses, contact lenses and dentures are not permitted into surgery (bring cases)      [x]

## 2024-06-20 ENCOUNTER — ANESTHESIA EVENT (OUTPATIENT)
Dept: OPERATING ROOM | Age: 49
End: 2024-06-20
Payer: COMMERCIAL

## 2024-06-21 ENCOUNTER — ANESTHESIA (OUTPATIENT)
Dept: OPERATING ROOM | Age: 49
End: 2024-06-21
Payer: COMMERCIAL

## 2024-06-21 ENCOUNTER — HOSPITAL ENCOUNTER (OUTPATIENT)
Age: 49
Setting detail: OUTPATIENT SURGERY
Discharge: HOME | End: 2024-06-21
Attending: STUDENT IN AN ORGANIZED HEALTH CARE EDUCATION/TRAINING PROGRAM | Admitting: STUDENT IN AN ORGANIZED HEALTH CARE EDUCATION/TRAINING PROGRAM
Payer: COMMERCIAL

## 2024-06-21 VITALS
OXYGEN SATURATION: 99 % | SYSTOLIC BLOOD PRESSURE: 144 MMHG | WEIGHT: 210 LBS | TEMPERATURE: 97.1 F | HEIGHT: 67 IN | BODY MASS INDEX: 32.96 KG/M2 | DIASTOLIC BLOOD PRESSURE: 71 MMHG | RESPIRATION RATE: 15 BRPM | HEART RATE: 72 BPM

## 2024-06-21 DIAGNOSIS — E04.1 NONTOXIC UNINODULAR GOITER: ICD-10-CM

## 2024-06-21 DIAGNOSIS — G89.18 POST-OPERATIVE PAIN: Primary | ICD-10-CM

## 2024-06-21 PROBLEM — E04.2 MULTINODULAR GOITER: Status: ACTIVE | Noted: 2024-06-21

## 2024-06-21 LAB
EKG ATRIAL RATE: 75 BPM
EKG P AXIS: 16 DEGREES
EKG P-R INTERVAL: 128 MS
EKG Q-T INTERVAL: 390 MS
EKG QRS DURATION: 104 MS
EKG QTC CALCULATION (BAZETT): 435 MS
EKG R AXIS: -29 DEGREES
EKG T AXIS: 4 DEGREES
EKG VENTRICULAR RATE: 75 BPM
GLUCOSE BLD-MCNC: 63 MG/DL (ref 74–99)
HCG, URINE, POC: NEGATIVE
Lab: NORMAL
NEGATIVE QC PASS/FAIL: NORMAL
POSITIVE QC PASS/FAIL: NORMAL
SEND OUT REPORT: NORMAL
TEST NAME: NORMAL

## 2024-06-21 PROCEDURE — 88307 TISSUE EXAM BY PATHOLOGIST: CPT

## 2024-06-21 PROCEDURE — 2500000003 HC RX 250 WO HCPCS

## 2024-06-21 PROCEDURE — 82962 GLUCOSE BLOOD TEST: CPT

## 2024-06-21 PROCEDURE — 3700000000 HC ANESTHESIA ATTENDED CARE: Performed by: STUDENT IN AN ORGANIZED HEALTH CARE EDUCATION/TRAINING PROGRAM

## 2024-06-21 PROCEDURE — 6360000002 HC RX W HCPCS: Performed by: NURSE ANESTHETIST, CERTIFIED REGISTERED

## 2024-06-21 PROCEDURE — 6360000002 HC RX W HCPCS: Performed by: STUDENT IN AN ORGANIZED HEALTH CARE EDUCATION/TRAINING PROGRAM

## 2024-06-21 PROCEDURE — 2580000003 HC RX 258: Performed by: ANESTHESIOLOGY

## 2024-06-21 PROCEDURE — 6370000000 HC RX 637 (ALT 250 FOR IP)

## 2024-06-21 PROCEDURE — 88305 TISSUE EXAM BY PATHOLOGIST: CPT

## 2024-06-21 PROCEDURE — 2709999900 HC NON-CHARGEABLE SUPPLY: Performed by: STUDENT IN AN ORGANIZED HEALTH CARE EDUCATION/TRAINING PROGRAM

## 2024-06-21 PROCEDURE — 2720000010 HC SURG SUPPLY STERILE: Performed by: STUDENT IN AN ORGANIZED HEALTH CARE EDUCATION/TRAINING PROGRAM

## 2024-06-21 PROCEDURE — 7100000000 HC PACU RECOVERY - FIRST 15 MIN: Performed by: STUDENT IN AN ORGANIZED HEALTH CARE EDUCATION/TRAINING PROGRAM

## 2024-06-21 PROCEDURE — 3600000013 HC SURGERY LEVEL 3 ADDTL 15MIN: Performed by: STUDENT IN AN ORGANIZED HEALTH CARE EDUCATION/TRAINING PROGRAM

## 2024-06-21 PROCEDURE — 2500000003 HC RX 250 WO HCPCS: Performed by: ANESTHESIOLOGY

## 2024-06-21 PROCEDURE — 3600000003 HC SURGERY LEVEL 3 BASE: Performed by: STUDENT IN AN ORGANIZED HEALTH CARE EDUCATION/TRAINING PROGRAM

## 2024-06-21 PROCEDURE — 6370000000 HC RX 637 (ALT 250 FOR IP): Performed by: ANESTHESIOLOGY

## 2024-06-21 PROCEDURE — 2500000003 HC RX 250 WO HCPCS: Performed by: STUDENT IN AN ORGANIZED HEALTH CARE EDUCATION/TRAINING PROGRAM

## 2024-06-21 PROCEDURE — 6360000002 HC RX W HCPCS

## 2024-06-21 PROCEDURE — 6360000002 HC RX W HCPCS: Performed by: ANESTHESIOLOGY

## 2024-06-21 PROCEDURE — 7100000011 HC PHASE II RECOVERY - ADDTL 15 MIN: Performed by: STUDENT IN AN ORGANIZED HEALTH CARE EDUCATION/TRAINING PROGRAM

## 2024-06-21 PROCEDURE — 7100000010 HC PHASE II RECOVERY - FIRST 15 MIN: Performed by: STUDENT IN AN ORGANIZED HEALTH CARE EDUCATION/TRAINING PROGRAM

## 2024-06-21 PROCEDURE — 2580000003 HC RX 258: Performed by: STUDENT IN AN ORGANIZED HEALTH CARE EDUCATION/TRAINING PROGRAM

## 2024-06-21 PROCEDURE — 7100000001 HC PACU RECOVERY - ADDTL 15 MIN: Performed by: STUDENT IN AN ORGANIZED HEALTH CARE EDUCATION/TRAINING PROGRAM

## 2024-06-21 PROCEDURE — 3700000001 HC ADD 15 MINUTES (ANESTHESIA): Performed by: STUDENT IN AN ORGANIZED HEALTH CARE EDUCATION/TRAINING PROGRAM

## 2024-06-21 RX ORDER — ONDANSETRON 2 MG/ML
INJECTION INTRAMUSCULAR; INTRAVENOUS PRN
Status: DISCONTINUED | OUTPATIENT
Start: 2024-06-21 | End: 2024-06-21 | Stop reason: SDUPTHER

## 2024-06-21 RX ORDER — LABETALOL HYDROCHLORIDE 5 MG/ML
5 INJECTION, SOLUTION INTRAVENOUS
Status: DISCONTINUED | OUTPATIENT
Start: 2024-06-21 | End: 2024-06-21 | Stop reason: HOSPADM

## 2024-06-21 RX ORDER — SODIUM CHLORIDE 0.9 % (FLUSH) 0.9 %
5-40 SYRINGE (ML) INJECTION PRN
Status: DISCONTINUED | OUTPATIENT
Start: 2024-06-21 | End: 2024-06-21 | Stop reason: HOSPADM

## 2024-06-21 RX ORDER — PROPOFOL 10 MG/ML
INJECTION, EMULSION INTRAVENOUS PRN
Status: DISCONTINUED | OUTPATIENT
Start: 2024-06-21 | End: 2024-06-21 | Stop reason: SDUPTHER

## 2024-06-21 RX ORDER — MIDAZOLAM HYDROCHLORIDE 1 MG/ML
INJECTION INTRAMUSCULAR; INTRAVENOUS PRN
Status: DISCONTINUED | OUTPATIENT
Start: 2024-06-21 | End: 2024-06-21 | Stop reason: SDUPTHER

## 2024-06-21 RX ORDER — SUCCINYLCHOLINE/SOD CL,ISO/PF 200MG/10ML
SYRINGE (ML) INTRAVENOUS PRN
Status: DISCONTINUED | OUTPATIENT
Start: 2024-06-21 | End: 2024-06-21 | Stop reason: SDUPTHER

## 2024-06-21 RX ORDER — NALOXONE HYDROCHLORIDE 0.4 MG/ML
INJECTION, SOLUTION INTRAMUSCULAR; INTRAVENOUS; SUBCUTANEOUS PRN
Status: DISCONTINUED | OUTPATIENT
Start: 2024-06-21 | End: 2024-06-21 | Stop reason: HOSPADM

## 2024-06-21 RX ORDER — SODIUM CHLORIDE 9 MG/ML
INJECTION, SOLUTION INTRAVENOUS PRN
Status: DISCONTINUED | OUTPATIENT
Start: 2024-06-21 | End: 2024-06-21 | Stop reason: HOSPADM

## 2024-06-21 RX ORDER — HYDROCODONE BITARTRATE AND ACETAMINOPHEN 5; 325 MG/1; MG/1
TABLET ORAL
Status: COMPLETED
Start: 2024-06-21 | End: 2024-06-21

## 2024-06-21 RX ORDER — SODIUM CHLORIDE 0.9 % (FLUSH) 0.9 %
5-40 SYRINGE (ML) INJECTION EVERY 12 HOURS SCHEDULED
Status: DISCONTINUED | OUTPATIENT
Start: 2024-06-21 | End: 2024-06-21 | Stop reason: HOSPADM

## 2024-06-21 RX ORDER — ACETAMINOPHEN 500 MG
1000 TABLET ORAL ONCE
Status: COMPLETED | OUTPATIENT
Start: 2024-06-21 | End: 2024-06-21

## 2024-06-21 RX ORDER — ROCURONIUM BROMIDE 10 MG/ML
INJECTION, SOLUTION INTRAVENOUS PRN
Status: DISCONTINUED | OUTPATIENT
Start: 2024-06-21 | End: 2024-06-21 | Stop reason: SDUPTHER

## 2024-06-21 RX ORDER — MEPERIDINE HYDROCHLORIDE 25 MG/ML
12.5 INJECTION INTRAMUSCULAR; INTRAVENOUS; SUBCUTANEOUS ONCE
Status: DISCONTINUED | OUTPATIENT
Start: 2024-06-21 | End: 2024-06-21 | Stop reason: HOSPADM

## 2024-06-21 RX ORDER — PROCHLORPERAZINE EDISYLATE 5 MG/ML
5 INJECTION INTRAMUSCULAR; INTRAVENOUS
Status: DISCONTINUED | OUTPATIENT
Start: 2024-06-21 | End: 2024-06-21 | Stop reason: HOSPADM

## 2024-06-21 RX ORDER — LIDOCAINE HYDROCHLORIDE AND EPINEPHRINE 10; 10 MG/ML; UG/ML
INJECTION, SOLUTION INFILTRATION; PERINEURAL PRN
Status: DISCONTINUED | OUTPATIENT
Start: 2024-06-21 | End: 2024-06-21 | Stop reason: ALTCHOICE

## 2024-06-21 RX ORDER — DEXAMETHASONE SODIUM PHOSPHATE 4 MG/ML
INJECTION, SOLUTION INTRA-ARTICULAR; INTRALESIONAL; INTRAMUSCULAR; INTRAVENOUS; SOFT TISSUE PRN
Status: DISCONTINUED | OUTPATIENT
Start: 2024-06-21 | End: 2024-06-21 | Stop reason: SDUPTHER

## 2024-06-21 RX ORDER — PHENYLEPHRINE HCL IN 0.9% NACL 1 MG/10 ML
SYRINGE (ML) INTRAVENOUS PRN
Status: DISCONTINUED | OUTPATIENT
Start: 2024-06-21 | End: 2024-06-21 | Stop reason: SDUPTHER

## 2024-06-21 RX ORDER — FENTANYL CITRATE 50 UG/ML
25 INJECTION, SOLUTION INTRAMUSCULAR; INTRAVENOUS EVERY 5 MIN PRN
Status: DISCONTINUED | OUTPATIENT
Start: 2024-06-21 | End: 2024-06-21 | Stop reason: HOSPADM

## 2024-06-21 RX ORDER — HYDROCODONE BITARTRATE AND ACETAMINOPHEN 5; 325 MG/1; MG/1
1 TABLET ORAL ONCE AS NEEDED
Status: COMPLETED | OUTPATIENT
Start: 2024-06-21 | End: 2024-06-21

## 2024-06-21 RX ORDER — METOCLOPRAMIDE HYDROCHLORIDE 5 MG/ML
10 INJECTION INTRAMUSCULAR; INTRAVENOUS ONCE
Status: COMPLETED | OUTPATIENT
Start: 2024-06-21 | End: 2024-06-21

## 2024-06-21 RX ORDER — HYDROCODONE BITARTRATE AND ACETAMINOPHEN 5; 325 MG/1; MG/1
1 TABLET ORAL EVERY 6 HOURS PRN
Qty: 18 TABLET | Refills: 0 | Status: SHIPPED | OUTPATIENT
Start: 2024-06-21 | End: 2024-06-26

## 2024-06-21 RX ORDER — LIDOCAINE HYDROCHLORIDE 20 MG/ML
INJECTION, SOLUTION EPIDURAL; INFILTRATION; INTRACAUDAL; PERINEURAL PRN
Status: DISCONTINUED | OUTPATIENT
Start: 2024-06-21 | End: 2024-06-21 | Stop reason: SDUPTHER

## 2024-06-21 RX ORDER — DIPHENHYDRAMINE HYDROCHLORIDE 50 MG/ML
12.5 INJECTION INTRAMUSCULAR; INTRAVENOUS
Status: DISCONTINUED | OUTPATIENT
Start: 2024-06-21 | End: 2024-06-21 | Stop reason: HOSPADM

## 2024-06-21 RX ORDER — KETAMINE HYDROCHLORIDE 10 MG/ML
INJECTION, SOLUTION INTRAMUSCULAR; INTRAVENOUS PRN
Status: DISCONTINUED | OUTPATIENT
Start: 2024-06-21 | End: 2024-06-21 | Stop reason: SDUPTHER

## 2024-06-21 RX ORDER — HYDRALAZINE HYDROCHLORIDE 20 MG/ML
5 INJECTION INTRAMUSCULAR; INTRAVENOUS
Status: DISCONTINUED | OUTPATIENT
Start: 2024-06-21 | End: 2024-06-21 | Stop reason: HOSPADM

## 2024-06-21 RX ORDER — FENTANYL CITRATE 50 UG/ML
INJECTION, SOLUTION INTRAMUSCULAR; INTRAVENOUS PRN
Status: DISCONTINUED | OUTPATIENT
Start: 2024-06-21 | End: 2024-06-21 | Stop reason: SDUPTHER

## 2024-06-21 RX ADMIN — Medication 200 MCG: at 14:31

## 2024-06-21 RX ADMIN — FENTANYL CITRATE 50 MCG: 50 INJECTION, SOLUTION INTRAMUSCULAR; INTRAVENOUS at 13:22

## 2024-06-21 RX ADMIN — FENTANYL CITRATE 100 MCG: 50 INJECTION, SOLUTION INTRAMUSCULAR; INTRAVENOUS at 13:09

## 2024-06-21 RX ADMIN — MIDAZOLAM 2 MG: 1 INJECTION INTRAMUSCULAR; INTRAVENOUS at 12:58

## 2024-06-21 RX ADMIN — FAMOTIDINE 20 MG: 10 INJECTION, SOLUTION INTRAVENOUS at 12:07

## 2024-06-21 RX ADMIN — ROCURONIUM BROMIDE 5 MG: 10 INJECTION, SOLUTION INTRAVENOUS at 13:09

## 2024-06-21 RX ADMIN — FENTANYL CITRATE 50 MCG: 50 INJECTION, SOLUTION INTRAMUSCULAR; INTRAVENOUS at 14:45

## 2024-06-21 RX ADMIN — HYDROCODONE BITARTRATE AND ACETAMINOPHEN 1 TABLET: 5; 325 TABLET ORAL at 16:10

## 2024-06-21 RX ADMIN — FENTANYL CITRATE 50 MCG: 50 INJECTION, SOLUTION INTRAMUSCULAR; INTRAVENOUS at 14:43

## 2024-06-21 RX ADMIN — PROPOFOL 180 MG: 10 INJECTION, EMULSION INTRAVENOUS at 13:09

## 2024-06-21 RX ADMIN — Medication 200 MCG: at 14:47

## 2024-06-21 RX ADMIN — Medication 200 MCG: at 14:15

## 2024-06-21 RX ADMIN — SODIUM CHLORIDE: 9 INJECTION, SOLUTION INTRAVENOUS at 13:34

## 2024-06-21 RX ADMIN — PROPOFOL 100 MCG/KG/MIN: 10 INJECTION, EMULSION INTRAVENOUS at 13:13

## 2024-06-21 RX ADMIN — Medication 120 MG: at 13:09

## 2024-06-21 RX ADMIN — ONDANSETRON 4 MG: 2 INJECTION INTRAMUSCULAR; INTRAVENOUS at 14:35

## 2024-06-21 RX ADMIN — Medication 200 MCG: at 14:00

## 2024-06-21 RX ADMIN — ACETAMINOPHEN 1000 MG: 500 TABLET ORAL at 12:05

## 2024-06-21 RX ADMIN — WATER 2000 MG: 1 INJECTION INTRAMUSCULAR; INTRAVENOUS; SUBCUTANEOUS at 13:13

## 2024-06-21 RX ADMIN — KETAMINE HYDROCHLORIDE 20 MG: 10 INJECTION INTRAMUSCULAR; INTRAVENOUS at 13:09

## 2024-06-21 RX ADMIN — Medication 200 MCG: at 14:02

## 2024-06-21 RX ADMIN — DEXAMETHASONE SODIUM PHOSPHATE 10 MG: 4 INJECTION, SOLUTION INTRAMUSCULAR; INTRAVENOUS at 13:13

## 2024-06-21 RX ADMIN — METOCLOPRAMIDE 10 MG: 5 INJECTION, SOLUTION INTRAMUSCULAR; INTRAVENOUS at 12:07

## 2024-06-21 RX ADMIN — Medication 150 MCG: at 14:27

## 2024-06-21 RX ADMIN — SODIUM CHLORIDE: 9 INJECTION, SOLUTION INTRAVENOUS at 13:06

## 2024-06-21 RX ADMIN — Medication 100 MCG: at 14:29

## 2024-06-21 RX ADMIN — LIDOCAINE HYDROCHLORIDE 100 MG: 20 INJECTION, SOLUTION EPIDURAL; INFILTRATION; INTRACAUDAL; PERINEURAL at 13:09

## 2024-06-21 ASSESSMENT — PAIN DESCRIPTION - DESCRIPTORS
DESCRIPTORS: SORE
DESCRIPTORS: SORE;SHARP;TENDER

## 2024-06-21 ASSESSMENT — PAIN - FUNCTIONAL ASSESSMENT
PAIN_FUNCTIONAL_ASSESSMENT: 0-10

## 2024-06-21 ASSESSMENT — LIFESTYLE VARIABLES: SMOKING_STATUS: 1

## 2024-06-21 ASSESSMENT — PAIN SCALES - GENERAL
PAINLEVEL_OUTOF10: 0
PAINLEVEL_OUTOF10: 8

## 2024-06-21 ASSESSMENT — PAIN DESCRIPTION - LOCATION: LOCATION: NECK;THROAT

## 2024-06-21 NOTE — DISCHARGE INSTRUCTIONS
Your incision is closed with dissolvable stitches and covered with a thin layer of glue; this will slowly flake off on its own over the next several days.    You may gently cleanse the area around your incision with soap and warm water, but do not scrub aggressively.    Avoid heavy lifting or exertional activity for 14 days after surgery.     Pain medication was sent to your pharmacy, take this as needed for post-op pain. If pain is not severe, you may take tyenol instead, but avoid ibuprofen/aleve/motrin/NSAIDs.     Follow-up for a post-op visit in 10-14 days.    Call the office with any questions/concerns. [phone]

## 2024-06-21 NOTE — H&P
[facility]  Attending History and Physical      CHIEF COMPLAINT:  Thyroid nodules    History Obtained From:  Patient    HISTORY OF PRESENT ILLNESS:    patient is a 49 y.o. female who presents for planned left hemithyroidectomy. The patient was initially evaluated in the office for multiple thyroid nodules which were incidentally found on a PET-CT. Follow-up thyroid ultrasound demonstrated multiple bilateral thyroid nodules with the most-concerning nodule involving the left midpole. FNA biopsy of this nodule was consistent with a [place] 4 nodule and Afirma testing was completed which showed an approximately 4% overall risk of malignancy. The patient reviewed options for management and did elect to proceed with left hemithyroidectomy.      Past Medical History:    Past Medical History:   Diagnosis Date    Diabetes mellitus (HCC)     Fatty liver disease, nonalcoholic     GERD (gastroesophageal reflux disease)     Migraine     +Aura    PCOS (polycystic ovarian syndrome)     Post partum depression        Past Surgical History:    Past Surgical History:   Procedure Laterality Date     SECTION      x1    COLONOSCOPY N/A 2024    COLONOSCOPY BIOPSY performed by Pily Whittaker MD at [facility] ENDOSCOPY    DILATION AND CURETTAGE OF UTERUS      SAB    TONSILLECTOMY      UPPER GASTROINTESTINAL ENDOSCOPY N/A 2024    ESOPHAGOGASTRODUODENOSCOPY BIOPSY performed by Pily Whittaker MD at [facility] ENDOSCOPY        Medications Prior to Admission:   Prior to Admission Medications   Prescriptions Last Dose Informant Patient Reported? Taking?   Insulin Infusion Pump (MINIMED 770G INSULIN PUMP SYS) KIT   No No   Sig: To infuse insulin   Insulin Pen Needle (BD PEN NEEDLE WEST U/F) 32G X 4 MM MISC   No No   Sig: Uses once per day   Insulin Pen Needle (BD ULTRA-FINE PEN NEEDLES) 29G X 12.7MM MISC   No No   Si each by Does not apply route 2 times daily Bd ultrafine needles to use with victoza  pen   Rimegepant Sulfate (NURTEC) 75 MG TBDP 6/20/2024  No No   Sig: Take 75 mg by mouth every other day   VICTOZA 18 MG/3ML SOPN SC injection 6/6/2024  No No   Sig: Inject 1.8 mg into the skin daily   albuterol sulfate  (90 Base) MCG/ACT inhaler   No No   Sig: Inhale 2 puffs into the lungs every 4 hours as needed for Wheezing   buPROPion (WELLBUTRIN XL) 150 MG extended release tablet 6/21/2024  No No   Sig: TAKE 1 TABLET BY MOUTH EVERY DAY IN THE MORNING   butalbital-acetaminophen-caffeine (FIORICET, ESGIC) -40 MG per tablet   No No   Sig: Take 1 tablet by mouth every 6 hours as needed for Headaches or Migraine   dexlansoprazole (DEXILANT) 60 MG CPDR delayed release capsule 6/20/2024  No No   Sig: TAKE 1 CAPSULE BY MOUTH EVERY DAY   doxycycline hyclate (VIBRAMYCIN) 100 MG capsule 6/20/2024  Yes No   Sig: at bedtime   insulin lispro (HUMALOG) 100 UNIT/ML SOLN injection vial 6/21/2024  No No   Sig: INJECT SUBCUTANEOUSLY VIA  INSULIN PUMP MAX 75 UNITS  PER DAY   lisinopril (PRINIVIL;ZESTRIL) 20 MG tablet 6/21/2024  No No   Sig: TAKE 1 TABLET BY MOUTH  DAILY   metFORMIN (GLUCOPHAGE) 1000 MG tablet 6/19/2024  No No   Sig: TAKE 1 TABLET BY MOUTH  TWICE DAILY WITH MEALS   Patient taking differently: Take 1 tablet by mouth 2 times daily (with meals) TAKE 1 TABLET BY MOUTH  TWICE DAILY WITH MEALS   rimegepant sulfate 75 MG TBDP Unknown  No No   Sig: Take 75 mg by mouth every other day Preventative treatment   Patient not taking: Reported on 5/2/2024   rosuvastatin (CRESTOR) 5 MG tablet Past Week  No No   Sig: TAKE 1 TABLET BY MOUTH AT  NIGHT   topiramate (TOPAMAX) 50 MG tablet 6/21/2024  No No   Sig: Take 1 tablet by mouth 2 times daily   venlafaxine (EFFEXOR XR) 150 MG extended release capsule 6/21/2024  No No   Sig: Take 1 capsule by mouth daily      Facility-Administered Medications: None          Allergies:  Patient has no known allergies.    Social History:   Social History     Socioeconomic History

## 2024-06-21 NOTE — ANESTHESIA PRE PROCEDURE
Department of Anesthesiology  Preprocedure Note       Name:  Bailee Andrews   Age:  49 y.o.  :  1975                                          MRN:  [id number]         Date:  2024      Surgeon: Surgeon(s):  Hayder Peace DO    Procedure: Procedure(s):  LEFT HEMITHYROIDECTOMY WITH NERVE INTEGRITY MONITOR    Medications prior to admission:   Prior to Admission medications    Medication Sig Start Date End Date Taking? Authorizing Provider   VICTOZA 18 MG/3ML SOPN SC injection Inject 1.8 mg into the skin daily 6/10/24   Ping Calloway APRN - CNP   rimegepant sulfate 75 MG TBDP Take 75 mg by mouth every other day Preventative treatment  Patient not taking: Reported on 2024 3/28/24   Angelica Gomez APRN - CNP   insulin lispro (HUMALOG) 100 UNIT/ML SOLN injection vial INJECT SUBCUTANEOUSLY VIA  INSULIN PUMP MAX 75 UNITS  PER DAY 24   Ping Calloway APRN - CNP   metFORMIN (GLUCOPHAGE) 1000 MG tablet TAKE 1 TABLET BY MOUTH  TWICE DAILY WITH MEALS  Patient taking differently: Take 1 tablet by mouth 2 times daily (with meals) TAKE 1 TABLET BY MOUTH  TWICE DAILY WITH MEALS 24   Ping Calloway APRN - CNP   lisinopril (PRINIVIL;ZESTRIL) 20 MG tablet TAKE 1 TABLET BY MOUTH  DAILY 24   Ellis Fisher DO   rosuvastatin (CRESTOR) 5 MG tablet TAKE 1 TABLET BY MOUTH AT  NIGHT 24   Ellis Fisher DO   dexlansoprazole (DEXILANT) 60 MG CPDR delayed release capsule TAKE 1 CAPSULE BY MOUTH EVERY DAY 24   Ellis Fisher DO   buPROPion (WELLBUTRIN XL) 150 MG extended release tablet TAKE 1 TABLET BY MOUTH EVERY DAY IN THE MORNING 24   Ellis Fisher DO   Rimegepant Sulfate (NURTEC) 75 MG TBDP Take 75 mg by mouth every other day 24   Angelica Gomez APRN - CNP   topiramate (TOPAMAX) 50 MG tablet Take 1 tablet by mouth 2 times daily 24   Angelica Gomez APRN - CNP   butalbital-acetaminophen-caffeine (FIORICET, ESGIC) -40 MG per tablet Take 1 tablet by mouth every

## 2024-06-21 NOTE — BRIEF OP NOTE
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]    Post-Op Diagnosis: Same       Procedure(s):  LEFT HEMITHYROIDECTOMY WITH NERVE INTEGRITY MONITOR    Surgeon(s):  Hayder Peace DO    Assistant:  Surgical Assistant: Deepa Green    Anesthesia: General    Estimated Blood Loss (mL): less than 50     Complications: None    Specimens:   ID Type Source Tests Collected by Time Destination   A : LEFT HEMITHYROID- SHORT STITCH INFERIOR FOLD, LONG STITCH-ISTHMUS Tissue Tissue SURGICAL PATHOLOGY Hayder Peace DO 6/21/2024 1429    B : LEFT LEVEL 6 LYMPH NODE Tissue Lymph Node SURGICAL PATHOLOGY Hayder Peace DO 6/21/2024 1436        Implants:  * No implants in log *      Drains: * No LDAs found *    Findings:  Infection Present At Time Of Surgery (PATOS) (choose all levels that have infection present):  No infection present  Other Findings: Left thyroid lobe excised with palpable nodule involving the mid/inferior pole. Small left level 6 lymph node versus ectopic thyroid tissue excised as well. Left recurrent laryngeal nerve identified and noted to be visually intact with appropriate nerve stim response at the conclusion of the procedure.     Electronically signed by Hayder Peace DO on 6/21/2024 at 2:57 PM

## 2024-06-21 NOTE — ANESTHESIA POSTPROCEDURE EVALUATION
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])    Surgeons: Hayder ePace DO Responsible Provider: Ronak Kaplan DO    Anesthesia Type: General ASA Status: 3            Anesthesia Type: General    João Phase I:      João Phase II:      Anesthesia Post Evaluation    Patient location during evaluation: PACU  Patient participation: complete - patient participated  Level of consciousness: awake  Pain score: 0  Airway patency: patent  Nausea & Vomiting: no vomiting and no nausea  Cardiovascular status: blood pressure returned to baseline and hemodynamically stable  Respiratory status: acceptable, nonlabored ventilation, face mask and spontaneous ventilation  Hydration status: stable  Pain management: adequate and satisfactory to patient        No notable events documented.

## 2024-06-24 NOTE — OP NOTE
Operative Note      Patient: Bailee Andrews  YOB: 1975  MRN: [id number]    Date of Procedure: 6/21/2024    Pre-Op Diagnosis Codes:     Multinodular goiter    Post-Op Diagnosis: Same       Procedure(s):  LEFT HEMITHYROIDECTOMY WITH NERVE INTEGRITY MONITOR    Surgeon(s):  Hayder Peace DO    Assistant:   Surgical Assistant: Deepa Green    Anesthesia: General    Estimated Blood Loss (mL): less than 50     Complications: None    Specimens:   ID Type Source Tests Collected by Time Destination   A : LEFT HEMITHYROID- SHORT STITCH INFERIOR FOLD, LONG STITCH-ISTHMUS Tissue Tissue SURGICAL PATHOLOGY Hayder Peace DO 6/21/2024 1429    B : LEFT LEVEL 6 LYMPH NODE Tissue Lymph Node SURGICAL PATHOLOGY Hayder Peace,  6/21/2024 1436        Implants:  * No implants in log *      Drains: * No LDAs found *    Findings:  Infection Present At Time Of Surgery (PATOS) (choose all levels that have infection present):  No infection present  Other Findings: Left thyroid lobe excised with palpable nodule involving the mid/inferior pole. Small left level 6 lymph node versus ectopic thyroid tissue excised as well. Left recurrent laryngeal nerve identified and noted to be visually intact with appropriate nerve stim response at the conclusion of the procedure.     Detailed Description of Procedure:     Indications: The patient is a 49 year old female who was evaluated in the office for a left thyroid nodule identified on PET-CT. Follow-up thyroid ultrasound demonstrated multiple bilateral thyroid nodules. FNA biopsies of the most prominent nodules revealed the right nodule was consistent with a [place] class 4 nodule and molecular testing suggested an overall risk of malignancy of 4%. Options for management were discussed with the patient, and she ultimately elected to proceed with hemithyroidectomy. Risks/benefits/alternatives were discussed with risks including but not limited to bleeding, infection, pain,

## 2024-07-02 LAB — SURGICAL PATHOLOGY REPORT: NORMAL

## 2024-07-09 RX ORDER — VENLAFAXINE HYDROCHLORIDE 150 MG/1
150 CAPSULE, EXTENDED RELEASE ORAL DAILY
Qty: 90 CAPSULE | Refills: 3 | Status: SHIPPED | OUTPATIENT
Start: 2024-07-09

## 2024-07-09 RX ORDER — SEMAGLUTIDE 0.68 MG/ML
INJECTION, SOLUTION SUBCUTANEOUS
Qty: 3 ML | Refills: 5 | Status: SHIPPED
Start: 2024-07-09 | End: 2024-07-17 | Stop reason: SDUPTHER

## 2024-07-15 ENCOUNTER — TELEPHONE (OUTPATIENT)
Dept: CARDIOLOGY CLINIC | Age: 49
End: 2024-07-15

## 2024-07-15 NOTE — TELEPHONE ENCOUNTER
----- Message from Bailee Andrews sent at 7/15/2024  7:46 AM EDT -----  Regarding: Result  Contact: [phone]  Good morning!   I had surgery 6/21/24 and as part had an ecg done. During all the fuss of surgery I was asked when I had a heart attack. Apparently something showed anterior infarction. Please review the result in my chart and let me know if that has been there or if I need to come see you.   Thank you! Have a good day!  Bailee

## 2024-07-17 DIAGNOSIS — E11.9 TYPE 2 DIABETES MELLITUS WITHOUT COMPLICATION, WITHOUT LONG-TERM CURRENT USE OF INSULIN (HCC): Primary | ICD-10-CM

## 2024-07-17 RX ORDER — SEMAGLUTIDE 0.68 MG/ML
INJECTION, SOLUTION SUBCUTANEOUS
Qty: 3 ML | Refills: 5 | Status: SHIPPED | OUTPATIENT
Start: 2024-07-17

## 2024-07-30 ENCOUNTER — TELEPHONE (OUTPATIENT)
Dept: SURGERY | Age: 49
End: 2024-07-30

## 2024-07-30 NOTE — TELEPHONE ENCOUNTER
Pt was seen by Dr YOUNG in clinic and was referred to Dr Romero for a liver lesion. Pt stated she has some other health issues she is having and wants to focus on that and then will follow up with this referral at a later date.

## 2024-07-31 ENCOUNTER — PATIENT MESSAGE (OUTPATIENT)
Dept: OBGYN | Age: 49
End: 2024-07-31

## 2024-08-08 DIAGNOSIS — G43.119 INTRACTABLE MIGRAINE WITH AURA WITHOUT STATUS MIGRAINOSUS: ICD-10-CM

## 2024-08-08 DIAGNOSIS — E11.9 TYPE 2 DIABETES MELLITUS WITHOUT COMPLICATION, WITHOUT LONG-TERM CURRENT USE OF INSULIN (HCC): ICD-10-CM

## 2024-08-09 RX ORDER — TOPIRAMATE 50 MG/1
50 TABLET, FILM COATED ORAL 2 TIMES DAILY
Qty: 60 TABLET | Refills: 5 | Status: SHIPPED | OUTPATIENT
Start: 2024-08-09

## 2024-08-09 RX ORDER — INSULIN LISPRO 100 [IU]/ML
INJECTION, SOLUTION INTRAVENOUS; SUBCUTANEOUS
Qty: 70 ML | Refills: 5 | Status: SHIPPED | OUTPATIENT
Start: 2024-08-09

## 2024-08-19 DIAGNOSIS — G43.119 INTRACTABLE MIGRAINE WITH AURA WITHOUT STATUS MIGRAINOSUS: ICD-10-CM

## 2024-08-19 RX ORDER — TOPIRAMATE 50 MG/1
50 TABLET, FILM COATED ORAL 2 TIMES DAILY
Qty: 60 TABLET | Refills: 5 | Status: SHIPPED | OUTPATIENT
Start: 2024-08-19

## 2024-08-30 RX ORDER — RIMEGEPANT SULFATE 75 MG/75MG
TABLET, ORALLY DISINTEGRATING ORAL
Qty: 16 TABLET | Refills: 1 | Status: SHIPPED | OUTPATIENT
Start: 2024-08-30

## 2024-09-05 ENCOUNTER — PROCEDURE VISIT (OUTPATIENT)
Dept: OBGYN | Age: 49
End: 2024-09-05
Payer: COMMERCIAL

## 2024-09-05 VITALS
WEIGHT: 209 LBS | DIASTOLIC BLOOD PRESSURE: 84 MMHG | BODY MASS INDEX: 32.73 KG/M2 | HEART RATE: 92 BPM | SYSTOLIC BLOOD PRESSURE: 119 MMHG

## 2024-09-05 DIAGNOSIS — Z30.430 ENCOUNTER FOR IUD INSERTION: ICD-10-CM

## 2024-09-05 DIAGNOSIS — N93.9 ABNORMAL UTERINE BLEEDING (AUB): Primary | ICD-10-CM

## 2024-09-05 LAB
CONTROL: NORMAL
PREGNANCY TEST URINE, POC: NEGATIVE

## 2024-09-05 PROCEDURE — 58300 INSERT INTRAUTERINE DEVICE: CPT | Performed by: OBSTETRICS & GYNECOLOGY

## 2024-09-05 PROCEDURE — 99213 OFFICE O/P EST LOW 20 MIN: CPT

## 2024-09-05 PROCEDURE — 81025 URINE PREGNANCY TEST: CPT | Performed by: OBSTETRICS & GYNECOLOGY

## 2024-09-05 NOTE — PROGRESS NOTES
IUD Insertion Note        Bailee Andrews  2024  Patient's last menstrual period was 2024 (approximate).  /84   Pulse 92   Wt 94.8 kg (209 lb)   LMP 2024 (Approximate)   Breastfeeding No   BMI 32.73 kg/m²       Pregnancy test was negative.  Procedure r/b/a and IUD side effects discussed.   Questions answered and consent signed.      49 y.o. female   presents with complaint of weight loss since 2024. Has lost 58 lbs without trying.      Periods are irregular. They are either very light or long and heavy. Periods are q 1-3 months. Bleeding when heavy causes her to change pad q 2 hours. +Clots. History of PCOS. Not currently on contraception. +Sexually active.      +tobacco     Recently had EGD and colonoscopy that were normal.      Pelvic US 3/1/24-uterus 11 cm, ES 4-5 mm, normal ovaries  3/6/24 normal mammo  24 CT b/l 2 cm ovarian cysts     FSH 17  US uterus 7.7 cm, ES 8.9 mm  Pap normal cytology, neg HPV     Pt has 2 nodules on PET scan on thyroid. Pt had surgery 24 for left thyroidectomy, benign    EMB negative  Thrombophilia panel negative        Procedure note:   The patient is requesting that a Mirena IUD be inserted.     Speculum placed in vagina.  Cervix painted w/ betadine and  grasped w/ a single tooth tenaculum.  Uterus sounded to 8 cm.  IUD inserted.  Strings trimmed to 1 in.  She tolerated the procedure well, without complications.      Post procedure instructions were discussed w/ the patient.  She will schedule a string check in 3 months.    Electronically signed by Sybil Thomason DO on 24

## 2024-09-05 NOTE — PROGRESS NOTES
Patient alert and pleasant with no new complaints  Here today for Mirena placement   Mirena ordered through [facility] specialty pharmacy and sent to this office from [facility] specialty pharmacy.  Consent form signed and scanned  Urine for pregnancy obtained with negative results  Patient tolerated well  All Mirena information scanned into chart  No bathes, no tampons, nothing in the vagina for 2 days   Discharge instructions have been discussed with the patient. Patient advised to call our office with any questions or concerns.   Voiced understanding.

## 2024-09-18 ENCOUNTER — OFFICE VISIT (OUTPATIENT)
Dept: NEUROLOGY | Age: 49
End: 2024-09-18
Payer: COMMERCIAL

## 2024-09-18 VITALS
DIASTOLIC BLOOD PRESSURE: 97 MMHG | BODY MASS INDEX: 32.73 KG/M2 | HEART RATE: 84 BPM | TEMPERATURE: 98 F | SYSTOLIC BLOOD PRESSURE: 140 MMHG | OXYGEN SATURATION: 98 % | WEIGHT: 209 LBS

## 2024-09-18 DIAGNOSIS — G43.709 CHRONIC MIGRAINE W/O AURA W/O STATUS MIGRAINOSUS, NOT INTRACTABLE: ICD-10-CM

## 2024-09-18 DIAGNOSIS — M54.81 BILATERAL OCCIPITAL NEURALGIA: ICD-10-CM

## 2024-09-18 DIAGNOSIS — G43.119 INTRACTABLE MIGRAINE WITH AURA WITHOUT STATUS MIGRAINOSUS: Primary | ICD-10-CM

## 2024-09-18 PROCEDURE — 3080F DIAST BP >= 90 MM HG: CPT | Performed by: NURSE PRACTITIONER

## 2024-09-18 PROCEDURE — 3077F SYST BP >= 140 MM HG: CPT | Performed by: NURSE PRACTITIONER

## 2024-09-18 PROCEDURE — 99214 OFFICE O/P EST MOD 30 MIN: CPT | Performed by: NURSE PRACTITIONER

## 2024-09-18 RX ORDER — RIMEGEPANT SULFATE 75 MG/75MG
75 TABLET, ORALLY DISINTEGRATING ORAL EVERY OTHER DAY
Qty: 16 TABLET | Refills: 5 | Status: SHIPPED | OUTPATIENT
Start: 2024-09-18

## 2024-09-19 ENCOUNTER — TELEPHONE (OUTPATIENT)
Dept: OBGYN | Age: 49
End: 2024-09-19

## 2024-09-19 NOTE — TELEPHONE ENCOUNTER
Patient called back to update on her bleeding. She states she's not filling a pad an hour. The bleeding is \"thinner\" than a regular period but red blood and has to wear a pad all day. She's been bleeding since insertion on 9/5. This didn't happen with last IUD that's why she's a bit concerned. It has lightened some. She's willing to wait until Monday to see how things go. Please respond to patient via [employer] as she works all day until 5pm and it's hard for her to answer her phone.

## 2024-09-20 NOTE — TELEPHONE ENCOUNTER
Left detailed vm with back line number to return call if bleeding heavy and would like seen next week.

## 2024-09-23 NOTE — TELEPHONE ENCOUNTER
Spoke with patient today. Bleeding has decreased. Turning brown, red now. Changing pads every couple hours now. About every 3 times she urinates.

## 2024-09-23 NOTE — TELEPHONE ENCOUNTER
From: Antonio Galeana  To: Ann Ace  Sent: 2/6/2024 6:25 AM CST  Subject: Need Refills    Hi Dr. Ace I need refills for Synthroid and Rosuvastatin Calcium I need a 90 day supply. Please send these requests to [facility].    Thank you  Antonio Galeana   Sent a mychart message to patient advising her to keep us notified, especially if bleeding should increase instead of decrease

## 2024-10-04 DIAGNOSIS — R63.4 WEIGHT LOSS OF MORE THAN 10% BODY WEIGHT: Primary | ICD-10-CM

## 2024-10-04 DIAGNOSIS — D50.9 IRON DEFICIENCY ANEMIA, UNSPECIFIED IRON DEFICIENCY ANEMIA TYPE: ICD-10-CM

## 2024-10-08 ENCOUNTER — HOSPITAL ENCOUNTER (OUTPATIENT)
Age: 49
Discharge: HOME | End: 2024-10-08
Payer: COMMERCIAL

## 2024-10-08 LAB
ALBUMIN SERPL-MCNC: 4.4 G/DL (ref 3.5–5.2)
ALP SERPL-CCNC: 70 U/L (ref 35–104)
ALT SERPL-CCNC: 15 U/L (ref 0–32)
ANION GAP SERPL CALCULATED.3IONS-SCNC: 9 MMOL/L (ref 7–16)
AST SERPL-CCNC: 16 U/L (ref 0–31)
BASOPHILS # BLD: 0.05 K/UL (ref 0–0.2)
BASOPHILS NFR BLD: 1 % (ref 0–2)
BILIRUB SERPL-MCNC: 0.4 MG/DL (ref 0–1.2)
BUN SERPL-MCNC: 10 MG/DL (ref 6–20)
CALCIUM SERPL-MCNC: 9.1 MG/DL (ref 8.6–10.2)
CHLORIDE SERPL-SCNC: 105 MMOL/L (ref 98–107)
CO2 SERPL-SCNC: 25 MMOL/L (ref 22–29)
CREAT SERPL-MCNC: 0.8 MG/DL (ref 0.5–1)
EOSINOPHIL # BLD: 0.1 K/UL (ref 0.05–0.5)
EOSINOPHILS RELATIVE PERCENT: 1 % (ref 0–6)
ERYTHROCYTE [DISTWIDTH] IN BLOOD BY AUTOMATED COUNT: 13.4 % (ref 11.5–15)
FERRITIN SERPL-MCNC: 20 NG/ML
GFR, ESTIMATED: >90 ML/MIN/1.73M2
GLUCOSE SERPL-MCNC: 152 MG/DL (ref 74–99)
HCT VFR BLD AUTO: 46 % (ref 34–48)
HGB BLD-MCNC: 15 G/DL (ref 11.5–15.5)
IMM GRANULOCYTES # BLD AUTO: <0.03 K/UL (ref 0–0.58)
IMM GRANULOCYTES NFR BLD: 0 % (ref 0–5)
IRON SATN MFR SERPL: 41 % (ref 15–50)
IRON SERPL-MCNC: 137 UG/DL (ref 37–145)
LYMPHOCYTES NFR BLD: 2.18 K/UL (ref 1.5–4)
LYMPHOCYTES RELATIVE PERCENT: 27 % (ref 20–42)
MCH RBC QN AUTO: 30.6 PG (ref 26–35)
MCHC RBC AUTO-ENTMCNC: 32.6 G/DL (ref 32–34.5)
MCV RBC AUTO: 93.9 FL (ref 80–99.9)
MONOCYTES NFR BLD: 0.48 K/UL (ref 0.1–0.95)
MONOCYTES NFR BLD: 6 % (ref 2–12)
NEUTROPHILS NFR BLD: 65 % (ref 43–80)
NEUTS SEG NFR BLD: 5.26 K/UL (ref 1.8–7.3)
PLATELET # BLD AUTO: 285 K/UL (ref 130–450)
PMV BLD AUTO: 11.6 FL (ref 7–12)
POTASSIUM SERPL-SCNC: 4.1 MMOL/L (ref 3.5–5)
PROT SERPL-MCNC: 7 G/DL (ref 6.4–8.3)
RBC # BLD AUTO: 4.9 M/UL (ref 3.5–5.5)
SODIUM SERPL-SCNC: 139 MMOL/L (ref 132–146)
TIBC SERPL-MCNC: 337 UG/DL (ref 250–450)
WBC OTHER # BLD: 8.1 K/UL (ref 4.5–11.5)

## 2024-10-08 PROCEDURE — 85025 COMPLETE CBC W/AUTO DIFF WBC: CPT

## 2024-10-08 PROCEDURE — 36415 COLL VENOUS BLD VENIPUNCTURE: CPT

## 2024-10-08 PROCEDURE — 83540 ASSAY OF IRON: CPT

## 2024-10-08 PROCEDURE — 83550 IRON BINDING TEST: CPT

## 2024-10-08 PROCEDURE — 80053 COMPREHEN METABOLIC PANEL: CPT

## 2024-10-08 PROCEDURE — 82728 ASSAY OF FERRITIN: CPT

## 2024-10-11 ENCOUNTER — HOSPITAL ENCOUNTER (OUTPATIENT)
Dept: INFUSION THERAPY | Age: 49
Discharge: HOME | End: 2024-10-11

## 2024-10-11 ENCOUNTER — OFFICE VISIT (OUTPATIENT)
Dept: ONCOLOGY | Age: 49
End: 2024-10-11
Payer: COMMERCIAL

## 2024-10-11 VITALS
OXYGEN SATURATION: 99 % | DIASTOLIC BLOOD PRESSURE: 113 MMHG | HEIGHT: 67 IN | WEIGHT: 213 LBS | HEART RATE: 96 BPM | TEMPERATURE: 97.6 F | BODY MASS INDEX: 33.43 KG/M2 | SYSTOLIC BLOOD PRESSURE: 168 MMHG

## 2024-10-11 DIAGNOSIS — D50.9 IRON DEFICIENCY ANEMIA, UNSPECIFIED IRON DEFICIENCY ANEMIA TYPE: Primary | ICD-10-CM

## 2024-10-11 PROCEDURE — 99213 OFFICE O/P EST LOW 20 MIN: CPT | Performed by: CLINICAL NURSE SPECIALIST

## 2024-10-11 PROCEDURE — 3077F SYST BP >= 140 MM HG: CPT | Performed by: CLINICAL NURSE SPECIALIST

## 2024-10-11 PROCEDURE — 3080F DIAST BP >= 90 MM HG: CPT | Performed by: CLINICAL NURSE SPECIALIST

## 2024-10-11 NOTE — PROGRESS NOTES
Requesting Physician:      CHIEF COMPLAINT:   Chief Complaint   Patient presents with    Anemia       PRIMARY HEMATOLOGIC/ONCOLOGIC DIAGNOSES:  Iron Deficiency Anemia  Weight loss    HISTORY OF PRESENT ILLNESS:   Bailee Andrews is a 48yo female who presents for evaluation of anemia.             Component  Ref Range & Units 3/1/24 1000 2/23/23 1342 8/13/21 0959 7/29/21 0858 11/11/20 0821 10/29/20 0859 9/9/19 1152   WBC  4.5 - 11.5 k/uL 9.4 11.0 R 9.4 R 10.8 R 10.4 R 12.2 High  R 7.6 R   RBC  3.50 - 5.50 m/uL 4.86 4.64 R 4.80 R 4.81 R 4.96 R 4.99 R 4.84 R   Hemoglobin  11.5 - 15.5 g/dL 11.0 Low  11.1 Low  R 10.2 Low  10.2 Low  11.7 11.6 12.3   Hematocrit  34.0 - 48.0 % 37.8 35.6 Low  R 35.3 35.4 39.1 39.1 41.0   MCV  80.0 - 99.9 fL 77.8 Low  76.6 Low  R 73.5 Low  73.6 Low  78.8 Low  78.4 Low  84.7   MCH  26.0 - 35.0 pg 22.6 Low  23.8 Low  R 21.3 Low  21.2 Low  23.6 Low  23.2 Low  25.4 Low    MCHC  32.0 - 34.5 g/dL 29.1 Low  31.1 Low  R 28.9 Low  R 28.8 Low  R 29.9 Low  R 29.7 Low  R 30.0 Low  R   RDW  11.5 - 15.0 % 20.0 High  18.5 High  R 19.8 High  R 19.6 High  R 19.0 High  R 18.7 High  R 16.0 High  R   Platelets  130 - 450 k/uL 427 346 R 393 R 377 R 341 R 341 R 326 R   MPV  7.0 - 12.0 fL 12.3 High  9.3 R 12.6 High  12.4 High  13.1 High  13.1 High  14.1 High    Neutrophils %  43.0 - 80.0 % 59  68.7  63.5     Lymphocytes %  20.0 - 42.0 % 32 27.6 R 20.9  26.4     Monocytes %  2.0 - 12.0 % 7 7.1 R 10.4  7.3     Eosinophils %  0 - 6 % 1 1.1 R 2.0 R  1.7 R     Basophils %  0.0 - 2.0 % 1 1.2 R 0.9  0.6     Immature Granulocytes  0.0 - 5.0 % 0         Neutrophils Absolute  1.80 - 7.30 k/uL 5.55 6.9 R 6.49 R  6.60 R     Lymphocytes Absolute  1.50 - 4.00 k/uL 2.98 3.0 R 1.97 R  2.75 R     Monocytes Absolute  0.10 - 0.95 k/uL 0.64 0.8 High  R 0.94 R  0.76 R     Eosinophils Absolute  0.05 - 0.50 k/uL 0.12 0.1 R 0.00 Low  R  0.18 R     Basophils

## 2024-12-02 ENCOUNTER — OFFICE VISIT (OUTPATIENT)
Dept: PRIMARY CARE CLINIC | Age: 49
End: 2024-12-02
Payer: COMMERCIAL

## 2024-12-02 VITALS
DIASTOLIC BLOOD PRESSURE: 102 MMHG | OXYGEN SATURATION: 96 % | RESPIRATION RATE: 16 BRPM | HEIGHT: 67 IN | BODY MASS INDEX: 34.37 KG/M2 | SYSTOLIC BLOOD PRESSURE: 174 MMHG | WEIGHT: 219 LBS | HEART RATE: 95 BPM

## 2024-12-02 DIAGNOSIS — F32.0 CURRENT MILD EPISODE OF MAJOR DEPRESSIVE DISORDER WITHOUT PRIOR EPISODE (HCC): ICD-10-CM

## 2024-12-02 DIAGNOSIS — E78.49 FAMILIAL HYPERLIPIDEMIA: ICD-10-CM

## 2024-12-02 DIAGNOSIS — E11.9 TYPE 2 DIABETES MELLITUS WITHOUT COMPLICATION, WITHOUT LONG-TERM CURRENT USE OF INSULIN (HCC): Primary | ICD-10-CM

## 2024-12-02 DIAGNOSIS — I10 ESSENTIAL HYPERTENSION: ICD-10-CM

## 2024-12-02 DIAGNOSIS — E11.9 TYPE 2 DIABETES MELLITUS WITHOUT COMPLICATION, WITHOUT LONG-TERM CURRENT USE OF INSULIN (HCC): ICD-10-CM

## 2024-12-02 DIAGNOSIS — R53.83 FATIGUE, UNSPECIFIED TYPE: ICD-10-CM

## 2024-12-02 DIAGNOSIS — K21.9 GASTROESOPHAGEAL REFLUX DISEASE WITHOUT ESOPHAGITIS: ICD-10-CM

## 2024-12-02 LAB
ALBUMIN: 4.3 G/DL (ref 3.5–5.2)
ALP BLD-CCNC: 69 U/L (ref 35–104)
ALT SERPL-CCNC: 15 U/L (ref 0–32)
ANION GAP SERPL CALCULATED.3IONS-SCNC: 16 MMOL/L (ref 7–16)
AST SERPL-CCNC: 15 U/L (ref 0–31)
BILIRUB SERPL-MCNC: 0.2 MG/DL (ref 0–1.2)
BUN BLDV-MCNC: 12 MG/DL (ref 6–20)
CALCIUM SERPL-MCNC: 9.3 MG/DL (ref 8.6–10.2)
CHLORIDE BLD-SCNC: 103 MMOL/L (ref 98–107)
CHOLESTEROL, TOTAL: 139 MG/DL
CO2: 20 MMOL/L (ref 22–29)
CREAT SERPL-MCNC: 0.8 MG/DL (ref 0.5–1)
CREATININE URINE: 24.9 MG/DL (ref 29–226)
GFR, ESTIMATED: >90 ML/MIN/1.73M2
GLUCOSE BLD-MCNC: 113 MG/DL (ref 74–99)
HBA1C MFR BLD: 6.4 % (ref 4–5.6)
HCT VFR BLD CALC: 45.4 % (ref 34–48)
HDLC SERPL-MCNC: 43 MG/DL
HEMOGLOBIN: 14.5 G/DL (ref 11.5–15.5)
LDL CHOLESTEROL: 64 MG/DL
MCH RBC QN AUTO: 30 PG (ref 26–35)
MCHC RBC AUTO-ENTMCNC: 31.9 G/DL (ref 32–34.5)
MCV RBC AUTO: 93.8 FL (ref 80–99.9)
MICROALBUMIN/CREAT 24H UR: <12 MG/L (ref 0–19)
MICROALBUMIN/CREAT UR-RTO: ABNORMAL MCG/MG CREAT (ref 0–30)
PDW BLD-RTO: 13.3 % (ref 11.5–15)
PLATELET, FLUORESCENCE: 288 K/UL (ref 130–450)
PMV BLD AUTO: 13.5 FL (ref 7–12)
POTASSIUM SERPL-SCNC: 4.7 MMOL/L (ref 3.5–5)
RBC # BLD: 4.84 M/UL (ref 3.5–5.5)
SODIUM BLD-SCNC: 139 MMOL/L (ref 132–146)
TOTAL PROTEIN: 7.1 G/DL (ref 6.4–8.3)
TRIGL SERPL-MCNC: 161 MG/DL
TSH SERPL DL<=0.05 MIU/L-ACNC: 2.55 UIU/ML (ref 0.27–4.2)
VLDLC SERPL CALC-MCNC: 32 MG/DL
WBC # BLD: 10.6 K/UL (ref 4.5–11.5)

## 2024-12-02 PROCEDURE — 3077F SYST BP >= 140 MM HG: CPT | Performed by: FAMILY MEDICINE

## 2024-12-02 PROCEDURE — 3044F HG A1C LEVEL LT 7.0%: CPT | Performed by: FAMILY MEDICINE

## 2024-12-02 PROCEDURE — 3080F DIAST BP >= 90 MM HG: CPT | Performed by: FAMILY MEDICINE

## 2024-12-02 PROCEDURE — 99214 OFFICE O/P EST MOD 30 MIN: CPT | Performed by: FAMILY MEDICINE

## 2024-12-02 RX ORDER — METOPROLOL SUCCINATE 25 MG/1
25 TABLET, EXTENDED RELEASE ORAL DAILY
Qty: 90 TABLET | Refills: 1 | Status: SHIPPED | OUTPATIENT
Start: 2024-12-02

## 2024-12-02 RX ORDER — LISINOPRIL 20 MG/1
TABLET ORAL
Qty: 90 TABLET | Refills: 3 | Status: SHIPPED | OUTPATIENT
Start: 2024-12-02

## 2024-12-02 RX ORDER — VENLAFAXINE HYDROCHLORIDE 150 MG/1
150 CAPSULE, EXTENDED RELEASE ORAL DAILY
Qty: 90 CAPSULE | Refills: 3 | Status: SHIPPED | OUTPATIENT
Start: 2024-12-02

## 2024-12-02 RX ORDER — DEXLANSOPRAZOLE 60 MG/1
CAPSULE, DELAYED RELEASE ORAL
Qty: 90 CAPSULE | Refills: 3 | Status: SHIPPED | OUTPATIENT
Start: 2024-12-02

## 2024-12-02 ASSESSMENT — ENCOUNTER SYMPTOMS
APNEA: 0
COUGH: 0
BLOOD IN STOOL: 0
EYE ITCHING: 0
CHEST TIGHTNESS: 0
SORE THROAT: 0
COLOR CHANGE: 0
ABDOMINAL PAIN: 0
VOMITING: 0
RHINORRHEA: 0
CONSTIPATION: 0
DIARRHEA: 0
NAUSEA: 0
EYE REDNESS: 0
EYE PAIN: 0
SHORTNESS OF BREATH: 0
SINUS PRESSURE: 0
WHEEZING: 0
BACK PAIN: 0

## 2024-12-02 ASSESSMENT — PATIENT HEALTH QUESTIONNAIRE - PHQ9
2. FEELING DOWN, DEPRESSED OR HOPELESS: NOT AT ALL
SUM OF ALL RESPONSES TO PHQ QUESTIONS 1-9: 0
4. FEELING TIRED OR HAVING LITTLE ENERGY: NOT AT ALL
SUM OF ALL RESPONSES TO PHQ QUESTIONS 1-9: 0
1. LITTLE INTEREST OR PLEASURE IN DOING THINGS: NOT AT ALL
3. TROUBLE FALLING OR STAYING ASLEEP: NOT AT ALL
SUM OF ALL RESPONSES TO PHQ QUESTIONS 1-9: 0
10. IF YOU CHECKED OFF ANY PROBLEMS, HOW DIFFICULT HAVE THESE PROBLEMS MADE IT FOR YOU TO DO YOUR WORK, TAKE CARE OF THINGS AT HOME, OR GET ALONG WITH OTHER PEOPLE: NOT DIFFICULT AT ALL
8. MOVING OR SPEAKING SO SLOWLY THAT OTHER PEOPLE COULD HAVE NOTICED. OR THE OPPOSITE, BEING SO FIGETY OR RESTLESS THAT YOU HAVE BEEN MOVING AROUND A LOT MORE THAN USUAL: NOT AT ALL
SUM OF ALL RESPONSES TO PHQ9 QUESTIONS 1 & 2: 0
6. FEELING BAD ABOUT YOURSELF - OR THAT YOU ARE A FAILURE OR HAVE LET YOURSELF OR YOUR FAMILY DOWN: NOT AT ALL
7. TROUBLE CONCENTRATING ON THINGS, SUCH AS READING THE NEWSPAPER OR WATCHING TELEVISION: NOT AT ALL
SUM OF ALL RESPONSES TO PHQ QUESTIONS 1-9: 0
5. POOR APPETITE OR OVEREATING: NOT AT ALL
9. THOUGHTS THAT YOU WOULD BE BETTER OFF DEAD, OR OF HURTING YOURSELF: NOT AT ALL

## 2024-12-02 NOTE — PROGRESS NOTES
Dysfunction of both eustachian tubes    DUB (dysfunctional uterine bleeding)    Current mild episode of major depressive disorder without prior episode (HCC)    Migraine without aura and without status migrainosus, not intractable    Iron deficiency anemia    Multinodular goiter       Past Medical History:   Diagnosis Date    Diabetes mellitus (HCC)     Fatty liver disease, nonalcoholic     GERD (gastroesophageal reflux disease)     Migraine     +Aura    PCOS (polycystic ovarian syndrome)     Post partum depression        Past Surgical History:   Procedure Laterality Date     SECTION      x1    COLONOSCOPY N/A 2024    COLONOSCOPY BIOPSY performed by Pily Whittaker MD at [facility] ENDOSCOPY    DILATION AND CURETTAGE OF UTERUS      SAB    THYROIDECTOMY Left 2024    LEFT HEMITHYROIDECTOMY WITH NERVE INTEGRITY MONITOR performed by Hayder Peace DO at [facility] OR    TONSILLECTOMY      UPPER GASTROINTESTINAL ENDOSCOPY N/A 2024    ESOPHAGOGASTRODUODENOSCOPY BIOPSY performed by Pily Whittaker MD at [facility] ENDOSCOPY       Current Outpatient Medications   Medication Sig Dispense Refill    dexlansoprazole (DEXILANT) 60 MG CPDR delayed release capsule TAKE 1 CAPSULE BY MOUTH EVERY DAY 90 capsule 3    venlafaxine (EFFEXOR XR) 150 MG extended release capsule Take 1 capsule by mouth daily 90 capsule 3    lisinopril (PRINIVIL;ZESTRIL) 20 MG tablet TAKE 1 TABLET BY MOUTH  DAILY 90 tablet 3    metoprolol succinate (TOPROL XL) 25 MG extended release tablet Take 1 tablet by mouth daily 90 tablet 1    metroNIDAZOLE (METROCREAM) 0.75 % cream APPLY TO FACE EVERY DAY AT BEDTIME      rimegepant sulfate (NURTEC) 75 MG TBDP Take 75 mg by mouth every other day 16 tablet 5    topiramate (TOPAMAX) 50 MG tablet Take 1 tablet by mouth 2 times daily 60 tablet 5    insulin lispro (HUMALOG) 100 UNIT/ML SOLN injection vial INJECT SUBCUTANEOUSLY VIA  INSULIN PUMP MAX 75 UNITS  PER DAY 70 mL 5    Semaglutide,0.25

## 2024-12-16 ENCOUNTER — OFFICE VISIT (OUTPATIENT)
Dept: PRIMARY CARE CLINIC | Age: 49
End: 2024-12-16
Payer: COMMERCIAL

## 2024-12-16 VITALS
OXYGEN SATURATION: 98 % | HEART RATE: 85 BPM | DIASTOLIC BLOOD PRESSURE: 100 MMHG | WEIGHT: 218 LBS | BODY MASS INDEX: 34.21 KG/M2 | HEIGHT: 67 IN | SYSTOLIC BLOOD PRESSURE: 164 MMHG | RESPIRATION RATE: 16 BRPM

## 2024-12-16 DIAGNOSIS — I10 ESSENTIAL HYPERTENSION: Primary | ICD-10-CM

## 2024-12-16 DIAGNOSIS — E11.9 TYPE 2 DIABETES MELLITUS WITHOUT COMPLICATION, WITHOUT LONG-TERM CURRENT USE OF INSULIN (HCC): ICD-10-CM

## 2024-12-16 DIAGNOSIS — R53.83 FATIGUE, UNSPECIFIED TYPE: ICD-10-CM

## 2024-12-16 DIAGNOSIS — E78.49 FAMILIAL HYPERLIPIDEMIA: ICD-10-CM

## 2024-12-16 DIAGNOSIS — F32.0 CURRENT MILD EPISODE OF MAJOR DEPRESSIVE DISORDER WITHOUT PRIOR EPISODE (HCC): ICD-10-CM

## 2024-12-16 PROCEDURE — 3077F SYST BP >= 140 MM HG: CPT | Performed by: FAMILY MEDICINE

## 2024-12-16 PROCEDURE — 3044F HG A1C LEVEL LT 7.0%: CPT | Performed by: FAMILY MEDICINE

## 2024-12-16 PROCEDURE — 3080F DIAST BP >= 90 MM HG: CPT | Performed by: FAMILY MEDICINE

## 2024-12-16 PROCEDURE — 99214 OFFICE O/P EST MOD 30 MIN: CPT | Performed by: FAMILY MEDICINE

## 2024-12-16 RX ORDER — AMLODIPINE BESYLATE 2.5 MG/1
2.5 TABLET ORAL DAILY
Qty: 90 TABLET | Refills: 1 | Status: SHIPPED | OUTPATIENT
Start: 2024-12-16

## 2024-12-16 ASSESSMENT — ENCOUNTER SYMPTOMS
SINUS PRESSURE: 0
WHEEZING: 0
BACK PAIN: 0
NAUSEA: 0
EYE REDNESS: 0
COUGH: 0
SORE THROAT: 0
CONSTIPATION: 0
CHEST TIGHTNESS: 0
RHINORRHEA: 0
EYE PAIN: 0
DIARRHEA: 0
COLOR CHANGE: 0
EYE ITCHING: 0
SHORTNESS OF BREATH: 0
ABDOMINAL PAIN: 0
BLOOD IN STOOL: 0
APNEA: 0
VOMITING: 0

## 2024-12-16 NOTE — ASSESSMENT & PLAN NOTE
Chronic, not at goal (unstable), changes made today: Add Amlodipine, medication adherence emphasized, and lifestyle modifications recommended

## 2024-12-16 NOTE — PROGRESS NOTES
Chief Complaint:     Chief Complaint   Patient presents with    Hypertension    Hyperlipidemia    Diabetes    Mental Health Problem    Fatigue         Hypertension  This is a chronic problem. The current episode started more than 1 month ago. The problem is unchanged. The problem is uncontrolled. Associated symptoms include anxiety and malaise/fatigue. Pertinent negatives include no chest pain, headaches, neck pain, palpitations or shortness of breath. There are no associated agents to hypertension. Risk factors for coronary artery disease include diabetes mellitus, dyslipidemia and obesity. Past treatments include ACE inhibitors and beta blockers. The current treatment provides mild improvement. There are no compliance problems.  There is no history of CAD/MI, CVA or PVD. There is no history of a hypertension causing med, pheochromocytoma, renovascular disease, sleep apnea or a thyroid problem.   Hyperlipidemia  This is a recurrent problem. The current episode started more than 1 month ago. The problem is controlled. Exacerbating diseases include diabetes and obesity. Pertinent negatives include no chest pain, myalgias or shortness of breath. Current antihyperlipidemic treatment includes statins. The current treatment provides significant improvement of lipids. There are no compliance problems.  Risk factors for coronary artery disease include diabetes mellitus, dyslipidemia, hypertension and obesity.   Diabetes  She presents for her follow-up diabetic visit. She has type 2 diabetes mellitus. Her disease course has been stable. Pertinent negatives for hypoglycemia include no dizziness, headaches or nervousness/anxiousness. Associated symptoms include fatigue. Pertinent negatives for diabetes include no chest pain and no weakness. There are no hypoglycemic complications. Symptoms are stable. There are no diabetic complications. Pertinent negatives for diabetic complications include no CVA or PVD. Risk factors for

## 2025-01-07 DIAGNOSIS — E11.9 TYPE 2 DIABETES MELLITUS WITHOUT COMPLICATION, WITHOUT LONG-TERM CURRENT USE OF INSULIN (HCC): ICD-10-CM

## 2025-01-07 RX ORDER — METOPROLOL SUCCINATE 25 MG/1
25 TABLET, EXTENDED RELEASE ORAL DAILY
Qty: 90 TABLET | Refills: 1 | Status: SHIPPED | OUTPATIENT
Start: 2025-01-07

## 2025-01-07 RX ORDER — SEMAGLUTIDE 0.68 MG/ML
INJECTION, SOLUTION SUBCUTANEOUS
Qty: 3 ML | Refills: 0 | Status: SHIPPED | OUTPATIENT
Start: 2025-01-07

## 2025-01-22 ENCOUNTER — HOSPITAL ENCOUNTER (OUTPATIENT)
Dept: INFUSION THERAPY | Age: 50
Discharge: HOME | End: 2025-01-22
Payer: COMMERCIAL

## 2025-01-22 DIAGNOSIS — D50.9 IRON DEFICIENCY ANEMIA, UNSPECIFIED IRON DEFICIENCY ANEMIA TYPE: ICD-10-CM

## 2025-01-22 LAB
ALBUMIN SERPL-MCNC: 4.5 G/DL (ref 3.5–5.2)
ALP SERPL-CCNC: 71 U/L (ref 35–104)
ALT SERPL-CCNC: 15 U/L (ref 0–32)
ANION GAP SERPL CALCULATED.3IONS-SCNC: 10 MMOL/L (ref 7–16)
AST SERPL-CCNC: 12 U/L (ref 0–31)
BASOPHILS # BLD: 0.08 K/UL (ref 0–0.2)
BASOPHILS NFR BLD: 1 % (ref 0–2)
BILIRUB SERPL-MCNC: 0.3 MG/DL (ref 0–1.2)
BUN SERPL-MCNC: 13 MG/DL (ref 6–20)
CALCIUM SERPL-MCNC: 9.6 MG/DL (ref 8.6–10.2)
CHLORIDE SERPL-SCNC: 103 MMOL/L (ref 98–107)
CO2 SERPL-SCNC: 25 MMOL/L (ref 22–29)
CREAT SERPL-MCNC: 0.8 MG/DL (ref 0.5–1)
EOSINOPHIL # BLD: 0.13 K/UL (ref 0.05–0.5)
EOSINOPHILS RELATIVE PERCENT: 1 % (ref 0–6)
ERYTHROCYTE [DISTWIDTH] IN BLOOD BY AUTOMATED COUNT: 13.8 % (ref 11.5–15)
FERRITIN SERPL-MCNC: 26 NG/ML
GFR, ESTIMATED: >90 ML/MIN/1.73M2
GLUCOSE SERPL-MCNC: 117 MG/DL (ref 74–99)
HCT VFR BLD AUTO: 46.4 % (ref 34–48)
HGB BLD-MCNC: 15 G/DL (ref 11.5–15.5)
IMM GRANULOCYTES # BLD AUTO: 0.04 K/UL (ref 0–0.58)
IMM GRANULOCYTES NFR BLD: 0 % (ref 0–5)
IRON SATN MFR SERPL: 30 % (ref 15–50)
IRON SERPL-MCNC: 104 UG/DL (ref 37–145)
LYMPHOCYTES NFR BLD: 2.7 K/UL (ref 1.5–4)
LYMPHOCYTES RELATIVE PERCENT: 25 % (ref 20–42)
MCH RBC QN AUTO: 29.9 PG (ref 26–35)
MCHC RBC AUTO-ENTMCNC: 32.3 G/DL (ref 32–34.5)
MCV RBC AUTO: 92.6 FL (ref 80–99.9)
MONOCYTES NFR BLD: 0.79 K/UL (ref 0.1–0.95)
MONOCYTES NFR BLD: 7 % (ref 2–12)
NEUTROPHILS NFR BLD: 65 % (ref 43–80)
NEUTS SEG NFR BLD: 7.05 K/UL (ref 1.8–7.3)
PLATELET # BLD AUTO: 292 K/UL (ref 130–450)
PMV BLD AUTO: 11.8 FL (ref 7–12)
POTASSIUM SERPL-SCNC: 4.2 MMOL/L (ref 3.5–5)
PROT SERPL-MCNC: 7.7 G/DL (ref 6.4–8.3)
RBC # BLD AUTO: 5.01 M/UL (ref 3.5–5.5)
SODIUM SERPL-SCNC: 138 MMOL/L (ref 132–146)
TIBC SERPL-MCNC: 349 UG/DL (ref 250–450)
WBC OTHER # BLD: 10.8 K/UL (ref 4.5–11.5)

## 2025-01-22 PROCEDURE — 83550 IRON BINDING TEST: CPT

## 2025-01-22 PROCEDURE — 36415 COLL VENOUS BLD VENIPUNCTURE: CPT

## 2025-01-22 PROCEDURE — 83540 ASSAY OF IRON: CPT

## 2025-01-22 PROCEDURE — 82728 ASSAY OF FERRITIN: CPT

## 2025-01-22 PROCEDURE — 85025 COMPLETE CBC W/AUTO DIFF WBC: CPT

## 2025-01-22 PROCEDURE — 80053 COMPREHEN METABOLIC PANEL: CPT

## 2025-01-23 ENCOUNTER — OFFICE VISIT (OUTPATIENT)
Dept: ONCOLOGY | Age: 50
End: 2025-01-23
Payer: COMMERCIAL

## 2025-01-23 ENCOUNTER — HOSPITAL ENCOUNTER (OUTPATIENT)
Dept: INFUSION THERAPY | Age: 50
Discharge: HOME | End: 2025-01-23

## 2025-01-23 VITALS
HEART RATE: 85 BPM | HEIGHT: 67 IN | TEMPERATURE: 97.4 F | BODY MASS INDEX: 34.53 KG/M2 | WEIGHT: 220 LBS | DIASTOLIC BLOOD PRESSURE: 98 MMHG | OXYGEN SATURATION: 100 % | SYSTOLIC BLOOD PRESSURE: 142 MMHG

## 2025-01-23 DIAGNOSIS — D50.9 IRON DEFICIENCY ANEMIA, UNSPECIFIED IRON DEFICIENCY ANEMIA TYPE: Primary | ICD-10-CM

## 2025-01-23 PROCEDURE — 3077F SYST BP >= 140 MM HG: CPT | Performed by: CLINICAL NURSE SPECIALIST

## 2025-01-23 PROCEDURE — 3080F DIAST BP >= 90 MM HG: CPT | Performed by: CLINICAL NURSE SPECIALIST

## 2025-01-23 PROCEDURE — 99213 OFFICE O/P EST LOW 20 MIN: CPT | Performed by: CLINICAL NURSE SPECIALIST

## 2025-01-23 NOTE — PROGRESS NOTES
Requesting Physician:      CHIEF COMPLAINT:   Chief Complaint   Patient presents with    Anemia       PRIMARY HEMATOLOGIC/ONCOLOGIC DIAGNOSES:  Iron Deficiency Anemia      HISTORY OF PRESENT ILLNESS:   Bailee Andrews is a 48yo female who presents for evaluation of anemia.             Component  Ref Range & Units 3/1/24 1000 2/23/23 1342 8/13/21 0959 7/29/21 0858 11/11/20 0821 10/29/20 0859 9/9/19 1152   WBC  4.5 - 11.5 k/uL 9.4 11.0 R 9.4 R 10.8 R 10.4 R 12.2 High  R 7.6 R   RBC  3.50 - 5.50 m/uL 4.86 4.64 R 4.80 R 4.81 R 4.96 R 4.99 R 4.84 R   Hemoglobin  11.5 - 15.5 g/dL 11.0 Low  11.1 Low  R 10.2 Low  10.2 Low  11.7 11.6 12.3   Hematocrit  34.0 - 48.0 % 37.8 35.6 Low  R 35.3 35.4 39.1 39.1 41.0   MCV  80.0 - 99.9 fL 77.8 Low  76.6 Low  R 73.5 Low  73.6 Low  78.8 Low  78.4 Low  84.7   MCH  26.0 - 35.0 pg 22.6 Low  23.8 Low  R 21.3 Low  21.2 Low  23.6 Low  23.2 Low  25.4 Low    MCHC  32.0 - 34.5 g/dL 29.1 Low  31.1 Low  R 28.9 Low  R 28.8 Low  R 29.9 Low  R 29.7 Low  R 30.0 Low  R   RDW  11.5 - 15.0 % 20.0 High  18.5 High  R 19.8 High  R 19.6 High  R 19.0 High  R 18.7 High  R 16.0 High  R   Platelets  130 - 450 k/uL 427 346 R 393 R 377 R 341 R 341 R 326 R   MPV  7.0 - 12.0 fL 12.3 High  9.3 R 12.6 High  12.4 High  13.1 High  13.1 High  14.1 High    Neutrophils %  43.0 - 80.0 % 59  68.7  63.5     Lymphocytes %  20.0 - 42.0 % 32 27.6 R 20.9  26.4     Monocytes %  2.0 - 12.0 % 7 7.1 R 10.4  7.3     Eosinophils %  0 - 6 % 1 1.1 R 2.0 R  1.7 R     Basophils %  0.0 - 2.0 % 1 1.2 R 0.9  0.6     Immature Granulocytes  0.0 - 5.0 % 0         Neutrophils Absolute  1.80 - 7.30 k/uL 5.55 6.9 R 6.49 R  6.60 R     Lymphocytes Absolute  1.50 - 4.00 k/uL 2.98 3.0 R 1.97 R  2.75 R     Monocytes Absolute  0.10 - 0.95 k/uL 0.64 0.8 High  R 0.94 R  0.76 R     Eosinophils Absolute  0.05 - 0.50 k/uL 0.12 0.1 R 0.00 Low  R  0.18 R     Basophils Absolute  0.00 -

## 2025-01-28 DIAGNOSIS — E11.9 TYPE 2 DIABETES MELLITUS WITHOUT COMPLICATION, WITHOUT LONG-TERM CURRENT USE OF INSULIN (HCC): ICD-10-CM

## 2025-01-28 RX ORDER — SEMAGLUTIDE 0.68 MG/ML
INJECTION, SOLUTION SUBCUTANEOUS
Qty: 3 ML | Refills: 0 | Status: SHIPPED | OUTPATIENT
Start: 2025-01-28

## 2025-01-28 NOTE — TELEPHONE ENCOUNTER
LOV: 5/30/24    Sent a [employer] message for patient to schedule a follow up appointment

## 2025-02-03 ENCOUNTER — OFFICE VISIT (OUTPATIENT)
Dept: PRIMARY CARE CLINIC | Age: 50
End: 2025-02-03
Payer: COMMERCIAL

## 2025-02-03 VITALS
BODY MASS INDEX: 34.84 KG/M2 | SYSTOLIC BLOOD PRESSURE: 134 MMHG | TEMPERATURE: 97 F | DIASTOLIC BLOOD PRESSURE: 84 MMHG | OXYGEN SATURATION: 96 % | HEIGHT: 67 IN | WEIGHT: 222 LBS | HEART RATE: 93 BPM

## 2025-02-03 DIAGNOSIS — E11.9 TYPE 2 DIABETES MELLITUS WITHOUT COMPLICATION, WITHOUT LONG-TERM CURRENT USE OF INSULIN (HCC): ICD-10-CM

## 2025-02-03 DIAGNOSIS — I10 ESSENTIAL HYPERTENSION: Primary | ICD-10-CM

## 2025-02-03 PROCEDURE — 3079F DIAST BP 80-89 MM HG: CPT | Performed by: FAMILY MEDICINE

## 2025-02-03 PROCEDURE — 3075F SYST BP GE 130 - 139MM HG: CPT | Performed by: FAMILY MEDICINE

## 2025-02-03 PROCEDURE — 99213 OFFICE O/P EST LOW 20 MIN: CPT | Performed by: FAMILY MEDICINE

## 2025-02-03 RX ORDER — AMLODIPINE BESYLATE 2.5 MG/1
2.5 TABLET ORAL DAILY
Qty: 90 TABLET | Refills: 1 | Status: SHIPPED | OUTPATIENT
Start: 2025-02-03

## 2025-02-03 RX ORDER — METOPROLOL SUCCINATE 25 MG/1
25 TABLET, EXTENDED RELEASE ORAL DAILY
Qty: 90 TABLET | Refills: 1 | Status: SHIPPED | OUTPATIENT
Start: 2025-02-03

## 2025-02-03 ASSESSMENT — ENCOUNTER SYMPTOMS
BACK PAIN: 0
COLOR CHANGE: 0
EYE ITCHING: 0
ABDOMINAL PAIN: 0
EYE REDNESS: 0
RHINORRHEA: 0
APNEA: 0
NAUSEA: 0
VOMITING: 0
BLOOD IN STOOL: 0
COUGH: 0
EYE PAIN: 0
SINUS PRESSURE: 0
CONSTIPATION: 0
SHORTNESS OF BREATH: 0
CHEST TIGHTNESS: 0
DIARRHEA: 0
SORE THROAT: 0
WHEEZING: 0

## 2025-02-03 NOTE — PROGRESS NOTES
Chief Complaint:     Chief Complaint   Patient presents with    Blood Pressure Check    Hypertension    Diabetes         Hypertension  This is a chronic problem. The current episode started more than 1 month ago. The problem is unchanged. The problem is uncontrolled. Associated symptoms include anxiety and malaise/fatigue. Pertinent negatives include no chest pain, headaches, neck pain, palpitations or shortness of breath. There are no associated agents to hypertension. Risk factors for coronary artery disease include diabetes mellitus, dyslipidemia and obesity. Past treatments include ACE inhibitors and beta blockers. The current treatment provides mild improvement. There are no compliance problems.  There is no history of CAD/MI, CVA or PVD. There is no history of a hypertension causing med, pheochromocytoma, renovascular disease, sleep apnea or a thyroid problem.   Diabetes  She presents for her follow-up diabetic visit. She has type 2 diabetes mellitus. Her disease course has been stable. Pertinent negatives for hypoglycemia include no dizziness, headaches or nervousness/anxiousness. Associated symptoms include fatigue. Pertinent negatives for diabetes include no chest pain and no weakness. There are no hypoglycemic complications. Symptoms are stable. There are no diabetic complications. Pertinent negatives for diabetic complications include no CVA or PVD. Risk factors for coronary artery disease include diabetes mellitus and obesity. Current diabetic treatment includes oral agent (triple therapy). She is compliant with treatment all of the time. Her weight is stable. She is following a generally healthy diet. When asked about meal planning, she reported none. She rarely participates in exercise. There is no change in her home blood glucose trend. An ACE inhibitor/angiotensin II receptor blocker is being taken. She does not see a podiatrist.Eye exam is current.   Hyperlipidemia  This is a recurrent problem. The

## 2025-02-11 ENCOUNTER — TELEPHONE (OUTPATIENT)
Dept: ENDOCRINOLOGY | Age: 50
End: 2025-02-11

## 2025-02-11 NOTE — TELEPHONE ENCOUNTER
Patient called in today needs refills on generic for Victoza as insurance is only covering generic sent to [facility] [employer]. - [place], [place] - [address] [place] [address] C - P [phone] - F [phone]. Patient also needs to schedule follow up, next avail in July but patient reports she may need to be seen sooner. Patient request to schedule her follow up appt and leave a VM with appt details as she is in court most of the time and cannot answer. Thanks!

## 2025-02-12 DIAGNOSIS — E11.9 TYPE 2 DIABETES MELLITUS WITHOUT COMPLICATION, WITHOUT LONG-TERM CURRENT USE OF INSULIN (HCC): Primary | ICD-10-CM

## 2025-02-12 RX ORDER — LIRAGLUTIDE 6 MG/ML
1.8 INJECTION SUBCUTANEOUS DAILY
Qty: 18 ML | Refills: 1 | Status: SHIPPED | OUTPATIENT
Start: 2025-02-12

## 2025-02-12 NOTE — TELEPHONE ENCOUNTER
Refill sent. Called pt and left message letting her know. Also advised pt. That I see she has an appt in May and if she needs to reschedule unfortunately I can not get her in sooner than July. Told her she can call on days she is available to check for last min cancellations.    no chest pain, no cough, and no shortness of breath.

## 2025-02-17 ENCOUNTER — PATIENT MESSAGE (OUTPATIENT)
Dept: NEUROLOGY | Age: 50
End: 2025-02-17

## 2025-02-17 ENCOUNTER — TELEPHONE (OUTPATIENT)
Dept: NEUROLOGY | Age: 50
End: 2025-02-17

## 2025-02-17 NOTE — TELEPHONE ENCOUNTER
Approved today by [employer] 2017  [place] Case: [id number], Status: Approved, Coverage Starts on: 2/17/2025 12:00:00 AM, Coverage Ends on: 2/17/2026 12:00:00 AM.  Authorization Expiration Date: 2/16/2026  Drug  Nurtec 75MG dispersible tablets

## 2025-02-17 NOTE — TELEPHONE ENCOUNTER
Approved today by [employer] 2017  [place] Case: [id number], Status: Approved, Coverage Starts on: 2/17/2025 12:00:00 AM, Coverage Ends on: 2/17/2026 12:00:00 AM.  Authorization Expiration Date: 2/16/2026  Drug  Nurtec 75MG dispersible tablets

## 2025-02-20 ENCOUNTER — OFFICE VISIT (OUTPATIENT)
Dept: OBGYN | Age: 50
End: 2025-02-20
Payer: COMMERCIAL

## 2025-02-20 VITALS
SYSTOLIC BLOOD PRESSURE: 127 MMHG | TEMPERATURE: 98.1 F | BODY MASS INDEX: 32.89 KG/M2 | OXYGEN SATURATION: 96 % | DIASTOLIC BLOOD PRESSURE: 82 MMHG | WEIGHT: 210 LBS | HEART RATE: 87 BPM

## 2025-02-20 DIAGNOSIS — Z12.31 SCREENING MAMMOGRAM, ENCOUNTER FOR: ICD-10-CM

## 2025-02-20 DIAGNOSIS — Z30.431 IUD CHECK UP: Primary | ICD-10-CM

## 2025-02-20 PROCEDURE — 99213 OFFICE O/P EST LOW 20 MIN: CPT | Performed by: OBSTETRICS & GYNECOLOGY

## 2025-02-20 PROCEDURE — 3079F DIAST BP 80-89 MM HG: CPT | Performed by: OBSTETRICS & GYNECOLOGY

## 2025-02-20 PROCEDURE — 3074F SYST BP LT 130 MM HG: CPT | Performed by: OBSTETRICS & GYNECOLOGY

## 2025-02-20 NOTE — PROGRESS NOTES
HISTORY OF PRESENT ILLNESS:    49 y.o. female     Here today for an IUD check. Mirena inserted on 2024. Patient has random spotting off and on. Sometimes pink in color and other times brown. Getting better (previously heavier).         Past Medical History:   Past Medical History:   Diagnosis Date    Diabetes mellitus (HCC)     Fatty liver disease, nonalcoholic     GERD (gastroesophageal reflux disease)     Migraine     +Aura    PCOS (polycystic ovarian syndrome)     Post partum depression                                              OB History    Para Term  AB Living   4 1 1   3 1   SAB IAB Ectopic Molar Multiple Live Births   3         1      # Outcome Date GA Lbr Jasbir/2nd Weight Sex Type Anes PTL Lv   4 SAB            3 Term      CS-LTranv   BILLY   2 SAB            1 SAB                  Past Surgical History:   Past Surgical History:   Procedure Laterality Date     SECTION      x1    COLONOSCOPY N/A 2024    COLONOSCOPY BIOPSY performed by Pily Whittaker MD at [facility] ENDOSCOPY    DILATION AND CURETTAGE OF UTERUS      SAB    THYROIDECTOMY Left 2024    LEFT HEMITHYROIDECTOMY WITH NERVE INTEGRITY MONITOR performed by Hayder Peace DO at [facility] OR    TONSILLECTOMY      UPPER GASTROINTESTINAL ENDOSCOPY N/A 2024    ESOPHAGOGASTRODUODENOSCOPY BIOPSY performed by Pily Whittaker MD at [facility] ENDOSCOPY        Allergies: Patient has no known allergies.     Medications:   Current Outpatient Medications   Medication Sig Dispense Refill    Liraglutide (VICTOZA) 18 MG/3ML SOPN SC injection Inject 1.8 mg into the skin daily 18 mL 1    amLODIPine (NORVASC) 2.5 MG tablet Take 1 tablet by mouth daily 90 tablet 1    metoprolol succinate (TOPROL XL) 25 MG extended release tablet Take 1 tablet by mouth daily 90 tablet 1    Semaglutide,0.25 or 0.5MG/DOS, (OZEMPIC, 0.25 OR 0.5 MG/DOSE,) 2 MG/3ML SOPN INJECT 0.5 MG INTO THE SKIN ONE TIME PER WEEK 3 mL 0

## 2025-02-20 NOTE — PROGRESS NOTES
Here today for an IUD check. Mirena inserted on 9/5/2024. Patient has random spotting off and on. Sometimes pink in color and other times brown.   Discharge instructions have been discussed with the patient. Patient advised to call our office with any questions or concerns.   Voiced understanding.

## 2025-02-24 DIAGNOSIS — E11.9 TYPE 2 DIABETES MELLITUS WITHOUT COMPLICATION, WITHOUT LONG-TERM CURRENT USE OF INSULIN (HCC): ICD-10-CM

## 2025-02-24 RX ORDER — LIRAGLUTIDE 6 MG/ML
INJECTION SUBCUTANEOUS
Qty: 9 ML | Refills: 1 | Status: SHIPPED | OUTPATIENT
Start: 2025-02-24

## 2025-03-09 DIAGNOSIS — E11.9 TYPE 2 DIABETES MELLITUS WITHOUT COMPLICATION, WITHOUT LONG-TERM CURRENT USE OF INSULIN (HCC): ICD-10-CM

## 2025-03-10 RX ORDER — SEMAGLUTIDE 0.68 MG/ML
INJECTION, SOLUTION SUBCUTANEOUS
Qty: 9 ML | Refills: 0 | Status: SHIPPED | OUTPATIENT
Start: 2025-03-10

## 2025-03-18 DIAGNOSIS — G43.119 INTRACTABLE MIGRAINE WITH AURA WITHOUT STATUS MIGRAINOSUS: ICD-10-CM

## 2025-03-18 RX ORDER — TOPIRAMATE 50 MG/1
50 TABLET, FILM COATED ORAL 2 TIMES DAILY
Qty: 60 TABLET | Refills: 5 | Status: SHIPPED | OUTPATIENT
Start: 2025-03-18

## 2025-03-18 NOTE — TELEPHONE ENCOUNTER
Pharmacy requesting a refill;    Plan:      Continue Topamax 50 mg twice daily     Continue Nurtec 75 mg every other day  --- through [facility]     Continue Fioricet     Follow-up in 1 year     Call with any questions or concern

## 2025-04-07 RX ORDER — BUPROPION HYDROCHLORIDE 150 MG/1
TABLET ORAL
Qty: 90 TABLET | Refills: 3 | Status: SHIPPED | OUTPATIENT
Start: 2025-04-07

## 2025-04-07 RX ORDER — ROSUVASTATIN CALCIUM 5 MG/1
TABLET, COATED ORAL
Qty: 90 TABLET | Refills: 3 | Status: SHIPPED | OUTPATIENT
Start: 2025-04-07

## 2025-05-17 ENCOUNTER — HOSPITAL ENCOUNTER (OUTPATIENT)
Age: 50
Discharge: HOME | End: 2025-05-17
Payer: COMMERCIAL

## 2025-05-17 DIAGNOSIS — D50.9 IRON DEFICIENCY ANEMIA, UNSPECIFIED IRON DEFICIENCY ANEMIA TYPE: ICD-10-CM

## 2025-05-17 DIAGNOSIS — I10 ESSENTIAL HYPERTENSION: ICD-10-CM

## 2025-05-17 DIAGNOSIS — E11.9 TYPE 2 DIABETES MELLITUS WITHOUT COMPLICATION, WITHOUT LONG-TERM CURRENT USE OF INSULIN (HCC): ICD-10-CM

## 2025-05-17 DIAGNOSIS — E78.49 FAMILIAL HYPERLIPIDEMIA: ICD-10-CM

## 2025-05-17 LAB
ALBUMIN SERPL-MCNC: 4.2 G/DL (ref 3.5–5.2)
ALP SERPL-CCNC: 70 U/L (ref 35–104)
ALT SERPL-CCNC: 18 U/L (ref 0–32)
ANION GAP SERPL CALCULATED.3IONS-SCNC: 10 MMOL/L (ref 7–16)
AST SERPL-CCNC: 17 U/L (ref 0–31)
BASOPHILS # BLD: 0.04 K/UL (ref 0–0.2)
BASOPHILS NFR BLD: 1 % (ref 0–2)
BILIRUB SERPL-MCNC: 0.3 MG/DL (ref 0–1.2)
BUN SERPL-MCNC: 12 MG/DL (ref 6–20)
CALCIUM SERPL-MCNC: 9.3 MG/DL (ref 8.6–10.2)
CHLORIDE SERPL-SCNC: 104 MMOL/L (ref 98–107)
CHOLEST SERPL-MCNC: 127 MG/DL
CO2 SERPL-SCNC: 25 MMOL/L (ref 22–29)
CREAT SERPL-MCNC: 0.8 MG/DL (ref 0.5–1)
EOSINOPHIL # BLD: 0.13 K/UL (ref 0.05–0.5)
EOSINOPHILS RELATIVE PERCENT: 2 % (ref 0–6)
ERYTHROCYTE [DISTWIDTH] IN BLOOD BY AUTOMATED COUNT: 13.2 % (ref 11.5–15)
FERRITIN SERPL-MCNC: 12 NG/ML
GFR, ESTIMATED: >90 ML/MIN/1.73M2
GLUCOSE SERPL-MCNC: 117 MG/DL (ref 74–99)
HBA1C MFR BLD: 6.5 % (ref 4–5.6)
HCT VFR BLD AUTO: 44.9 % (ref 34–48)
HDLC SERPL-MCNC: 38 MG/DL
HGB BLD-MCNC: 14.2 G/DL (ref 11.5–15.5)
IMM GRANULOCYTES # BLD AUTO: 0.03 K/UL (ref 0–0.58)
IMM GRANULOCYTES NFR BLD: 0 % (ref 0–5)
IRON SATN MFR SERPL: 24 % (ref 15–50)
IRON SERPL-MCNC: 79 UG/DL (ref 37–145)
LDLC SERPL CALC-MCNC: 58 MG/DL
LYMPHOCYTES NFR BLD: 1.84 K/UL (ref 1.5–4)
LYMPHOCYTES RELATIVE PERCENT: 22 % (ref 20–42)
MCH RBC QN AUTO: 29.8 PG (ref 26–35)
MCHC RBC AUTO-ENTMCNC: 31.6 G/DL (ref 32–34.5)
MCV RBC AUTO: 94.1 FL (ref 80–99.9)
MONOCYTES NFR BLD: 0.67 K/UL (ref 0.1–0.95)
MONOCYTES NFR BLD: 8 % (ref 2–12)
NEUTROPHILS NFR BLD: 68 % (ref 43–80)
NEUTS SEG NFR BLD: 5.84 K/UL (ref 1.8–7.3)
PLATELET, FLUORESCENCE: 264 K/UL (ref 130–450)
PMV BLD AUTO: 12 FL (ref 7–12)
POTASSIUM SERPL-SCNC: 4.2 MMOL/L (ref 3.5–5)
PROT SERPL-MCNC: 7 G/DL (ref 6.4–8.3)
RBC # BLD AUTO: 4.77 M/UL (ref 3.5–5.5)
SODIUM SERPL-SCNC: 139 MMOL/L (ref 132–146)
TIBC SERPL-MCNC: 326 UG/DL (ref 250–450)
TRIGL SERPL-MCNC: 154 MG/DL
TSH SERPL DL<=0.05 MIU/L-ACNC: 1.28 UIU/ML (ref 0.27–4.2)
VLDLC SERPL CALC-MCNC: 31 MG/DL
WBC OTHER # BLD: 8.6 K/UL (ref 4.5–11.5)

## 2025-05-17 PROCEDURE — 85025 COMPLETE CBC W/AUTO DIFF WBC: CPT

## 2025-05-17 PROCEDURE — 84443 ASSAY THYROID STIM HORMONE: CPT

## 2025-05-17 PROCEDURE — 36415 COLL VENOUS BLD VENIPUNCTURE: CPT

## 2025-05-17 PROCEDURE — 83550 IRON BINDING TEST: CPT

## 2025-05-17 PROCEDURE — 80061 LIPID PANEL: CPT

## 2025-05-17 PROCEDURE — 83036 HEMOGLOBIN GLYCOSYLATED A1C: CPT

## 2025-05-17 PROCEDURE — 82728 ASSAY OF FERRITIN: CPT

## 2025-05-17 PROCEDURE — 83540 ASSAY OF IRON: CPT

## 2025-05-17 PROCEDURE — 80053 COMPREHEN METABOLIC PANEL: CPT

## 2025-05-22 ENCOUNTER — OFFICE VISIT (OUTPATIENT)
Dept: PRIMARY CARE CLINIC | Age: 50
End: 2025-05-22

## 2025-05-22 ENCOUNTER — OFFICE VISIT (OUTPATIENT)
Age: 50
End: 2025-05-22
Payer: COMMERCIAL

## 2025-05-22 ENCOUNTER — HOSPITAL ENCOUNTER (OUTPATIENT)
Dept: INFUSION THERAPY | Age: 50
Discharge: HOME | End: 2025-05-22

## 2025-05-22 VITALS
BODY MASS INDEX: 35 KG/M2 | HEIGHT: 67 IN | HEART RATE: 89 BPM | TEMPERATURE: 97.4 F | DIASTOLIC BLOOD PRESSURE: 110 MMHG | WEIGHT: 223 LBS | OXYGEN SATURATION: 98 % | SYSTOLIC BLOOD PRESSURE: 150 MMHG

## 2025-05-22 VITALS
BODY MASS INDEX: 35.47 KG/M2 | HEIGHT: 67 IN | TEMPERATURE: 97.2 F | HEART RATE: 92 BPM | WEIGHT: 226 LBS | DIASTOLIC BLOOD PRESSURE: 86 MMHG | RESPIRATION RATE: 18 BRPM | OXYGEN SATURATION: 98 % | SYSTOLIC BLOOD PRESSURE: 138 MMHG

## 2025-05-22 DIAGNOSIS — G43.009 MIGRAINE WITHOUT AURA AND WITHOUT STATUS MIGRAINOSUS, NOT INTRACTABLE: ICD-10-CM

## 2025-05-22 DIAGNOSIS — E78.49 FAMILIAL HYPERLIPIDEMIA: Chronic | ICD-10-CM

## 2025-05-22 DIAGNOSIS — D50.9 IRON DEFICIENCY ANEMIA, UNSPECIFIED IRON DEFICIENCY ANEMIA TYPE: Primary | ICD-10-CM

## 2025-05-22 DIAGNOSIS — F32.0 CURRENT MILD EPISODE OF MAJOR DEPRESSIVE DISORDER WITHOUT PRIOR EPISODE: ICD-10-CM

## 2025-05-22 DIAGNOSIS — R80.9 TYPE 2 DIABETES MELLITUS WITH MICROALBUMINURIA, WITH LONG-TERM CURRENT USE OF INSULIN (HCC): ICD-10-CM

## 2025-05-22 DIAGNOSIS — D50.9 IRON DEFICIENCY ANEMIA, UNSPECIFIED IRON DEFICIENCY ANEMIA TYPE: ICD-10-CM

## 2025-05-22 DIAGNOSIS — Z79.4 TYPE 2 DIABETES MELLITUS WITH MICROALBUMINURIA, WITH LONG-TERM CURRENT USE OF INSULIN (HCC): ICD-10-CM

## 2025-05-22 DIAGNOSIS — E11.29 TYPE 2 DIABETES MELLITUS WITH MICROALBUMINURIA, WITH LONG-TERM CURRENT USE OF INSULIN (HCC): ICD-10-CM

## 2025-05-22 DIAGNOSIS — I10 ESSENTIAL HYPERTENSION: Primary | ICD-10-CM

## 2025-05-22 PROCEDURE — 99213 OFFICE O/P EST LOW 20 MIN: CPT | Performed by: CLINICAL NURSE SPECIALIST

## 2025-05-22 PROCEDURE — 3077F SYST BP >= 140 MM HG: CPT | Performed by: CLINICAL NURSE SPECIALIST

## 2025-05-22 PROCEDURE — 3080F DIAST BP >= 90 MM HG: CPT | Performed by: CLINICAL NURSE SPECIALIST

## 2025-05-22 RX ORDER — LISINOPRIL AND HYDROCHLOROTHIAZIDE 20; 25 MG/1; MG/1
1 TABLET ORAL DAILY
Qty: 90 TABLET | Refills: 1 | Status: SHIPPED | OUTPATIENT
Start: 2025-05-22

## 2025-05-22 RX ORDER — MINOCYCLINE HYDROCHLORIDE 100 MG/1
1 CAPSULE ORAL
COMMUNITY
Start: 2025-02-04

## 2025-05-22 ASSESSMENT — ENCOUNTER SYMPTOMS
EYE PAIN: 0
RHINORRHEA: 0
COLOR CHANGE: 0
VOMITING: 0
DIARRHEA: 0
BACK PAIN: 0
CHEST TIGHTNESS: 0
CONSTIPATION: 0
APNEA: 0
SORE THROAT: 0
BLOOD IN STOOL: 0
WHEEZING: 0
SINUS PRESSURE: 0
COUGH: 0
ABDOMINAL PAIN: 0
NAUSEA: 0
EYE ITCHING: 0
SHORTNESS OF BREATH: 0
EYE REDNESS: 0

## 2025-05-22 NOTE — PROGRESS NOTES
25 MG extended release tablet Take 1 tablet by mouth daily 90 tablet 1    dexlansoprazole (DEXILANT) 60 MG CPDR delayed release capsule TAKE 1 CAPSULE BY MOUTH EVERY DAY 90 capsule 3    venlafaxine (EFFEXOR XR) 150 MG extended release capsule Take 1 capsule by mouth daily 90 capsule 3    lisinopril (PRINIVIL;ZESTRIL) 20 MG tablet TAKE 1 TABLET BY MOUTH  DAILY 90 tablet 3    metroNIDAZOLE (METROCREAM) 0.75 % cream APPLY TO FACE EVERY DAY AT BEDTIME      rimegepant sulfate (NURTEC) 75 MG TBDP Take 75 mg by mouth every other day 16 tablet 5    insulin lispro (HUMALOG) 100 UNIT/ML SOLN injection vial INJECT SUBCUTANEOUSLY VIA  INSULIN PUMP MAX 75 UNITS  PER DAY 70 mL 5    metFORMIN (GLUCOPHAGE) 1000 MG tablet TAKE 1 TABLET BY MOUTH  TWICE DAILY WITH MEALS 180 tablet 3    butalbital-acetaminophen-caffeine (FIORICET, ESGIC) -40 MG per tablet Take 1 tablet by mouth every 6 hours as needed for Headaches or Migraine 90 tablet 3    Insulin Pen Needle (BD ULTRA-FINE PEN NEEDLES) 29G X 12.7MM MISC 1 each by Does not apply route 2 times daily Bd ultrafine needles to use with victoza pen 200 each 3    Insulin Pen Needle (BD PEN NEEDLE WEST U/F) 32G X 4 MM MISC Uses once per day 100 each 3    Insulin Infusion Pump (MINIMED 770G INSULIN PUMP SYS) KIT To infuse insulin 1 kit 0    albuterol sulfate  (90 Base) MCG/ACT inhaler Inhale 2 puffs into the lungs every 4 hours as needed for Wheezing 1 Inhaler 0     No current facility-administered medications for this visit.       No Known Allergies    Social History     Socioeconomic History    Marital status:      Spouse name: None    Number of children: None    Years of education: None    Highest education level: None   Occupational History     Employer: [employer]   Tobacco Use    Smoking status: Every Day     Current packs/day: 0.00     Types: Cigars, Cigarettes     Start date: 2006     Last attempt to quit: 2016     Years since quittin.5

## 2025-05-29 ENCOUNTER — OFFICE VISIT (OUTPATIENT)
Dept: ENDOCRINOLOGY | Age: 50
End: 2025-05-29
Payer: COMMERCIAL

## 2025-05-29 VITALS
WEIGHT: 223.6 LBS | DIASTOLIC BLOOD PRESSURE: 90 MMHG | HEART RATE: 84 BPM | OXYGEN SATURATION: 95 % | TEMPERATURE: 98.7 F | SYSTOLIC BLOOD PRESSURE: 138 MMHG | BODY MASS INDEX: 35.01 KG/M2

## 2025-05-29 DIAGNOSIS — E04.2 MULTIPLE THYROID NODULES: ICD-10-CM

## 2025-05-29 DIAGNOSIS — E55.9 VITAMIN D DEFICIENCY: ICD-10-CM

## 2025-05-29 DIAGNOSIS — E66.09 CLASS 1 OBESITY DUE TO EXCESS CALORIES WITHOUT SERIOUS COMORBIDITY WITH BODY MASS INDEX (BMI) OF 33.0 TO 33.9 IN ADULT: ICD-10-CM

## 2025-05-29 DIAGNOSIS — E78.5 HYPERLIPIDEMIA, UNSPECIFIED HYPERLIPIDEMIA TYPE: ICD-10-CM

## 2025-05-29 DIAGNOSIS — E11.29 TYPE 2 DIABETES MELLITUS WITH MICROALBUMINURIA, WITH LONG-TERM CURRENT USE OF INSULIN (HCC): Primary | ICD-10-CM

## 2025-05-29 DIAGNOSIS — Z79.4 TYPE 2 DIABETES MELLITUS WITH MICROALBUMINURIA, WITH LONG-TERM CURRENT USE OF INSULIN (HCC): Primary | ICD-10-CM

## 2025-05-29 DIAGNOSIS — E66.811 CLASS 1 OBESITY DUE TO EXCESS CALORIES WITHOUT SERIOUS COMORBIDITY WITH BODY MASS INDEX (BMI) OF 33.0 TO 33.9 IN ADULT: ICD-10-CM

## 2025-05-29 DIAGNOSIS — R80.9 TYPE 2 DIABETES MELLITUS WITH MICROALBUMINURIA, WITH LONG-TERM CURRENT USE OF INSULIN (HCC): Primary | ICD-10-CM

## 2025-05-29 DIAGNOSIS — E11.9 TYPE 2 DIABETES MELLITUS WITHOUT COMPLICATION, WITHOUT LONG-TERM CURRENT USE OF INSULIN (HCC): ICD-10-CM

## 2025-05-29 PROCEDURE — 3080F DIAST BP >= 90 MM HG: CPT | Performed by: CLINICAL NURSE SPECIALIST

## 2025-05-29 PROCEDURE — 99214 OFFICE O/P EST MOD 30 MIN: CPT | Performed by: CLINICAL NURSE SPECIALIST

## 2025-05-29 PROCEDURE — 95251 CONT GLUC MNTR ANALYSIS I&R: CPT | Performed by: CLINICAL NURSE SPECIALIST

## 2025-05-29 PROCEDURE — 3075F SYST BP GE 130 - 139MM HG: CPT | Performed by: CLINICAL NURSE SPECIALIST

## 2025-05-29 PROCEDURE — 3044F HG A1C LEVEL LT 7.0%: CPT | Performed by: CLINICAL NURSE SPECIALIST

## 2025-05-29 RX ORDER — INSULIN LISPRO 100 [IU]/ML
INJECTION, SOLUTION INTRAVENOUS; SUBCUTANEOUS
Qty: 70 ML | Refills: 5 | Status: SHIPPED | OUTPATIENT
Start: 2025-05-29

## 2025-05-29 NOTE — PROGRESS NOTES
[facility] [employer] [facility] Department of Endocrinology Diabetes and Metabolism   [address], [place], [place], [address]   Phone: [phone]  Fax: [phone]    Date of Service: 5/29/2025    Primary Care Physician: Ellis Fisher DO  Referring physician: No ref. provider found  Provider: TONI Hurt - [facility]     Reason for the visit:  Type 2 DM     History of Present Illness:  The history is provided by the patient. No  was used. Accuracy of the patient data is excellent.  Bailee Andrews is a very pleasant 50 y.o. female seen today for diabetes management     Bailee Andrews was diagnosed with diabetes early 30s. Started as gestational diabetes and she is currently on 770g Medtronic insulin pump with CGM   On Metformin 1000 mg BID   Victoza 1.8 mg daily   Current pump settings: Basal rate 0.7, CR 12a 11, 10a 13, ISF 53, goal 100-150, active insulin time 3 hrs   Ambulatory continuous glucose monitoring of interstitial tissue fluid via a subcutaneous sensor for a minimum of 72 hours; analysis, interpretation and report  Average sensor glucose 147  Time in range 95%  Hyperglycemia 5%  Hypoglycemia 0-%    Lab Results   Component Value Date/Time    LABA1C 6.5 05/17/2025 10:48 AM    LABA1C 6.4 12/02/2024 08:32 AM    LABA1C 6.1 05/30/2024 09:48 AM     Patient has had no hypoglycemic episodes   Very good with following diabetes diet and encouraged   I reviewed current medications and the patient has no issues with diabetes medications  Bailee Andrews is up to date with eye exam and denied any history of diabetic retinopathy, retinopathy   The patient performs her own feet care and doesn't see podiatry   Microvascular complications:  No Retinopathy, Nephropathy or Neuropathy   Macrovascular complications: no CAD, PVD, or Stroke  On statin      PAST MEDICAL HISTORY   Past Medical History:   Diagnosis Date    Diabetes mellitus (HCC)     Fatty liver disease, nonalcoholic

## 2025-06-15 DIAGNOSIS — E11.9 TYPE 2 DIABETES MELLITUS WITHOUT COMPLICATION, WITHOUT LONG-TERM CURRENT USE OF INSULIN (HCC): ICD-10-CM

## 2025-06-16 RX ORDER — LISINOPRIL AND HYDROCHLOROTHIAZIDE 20; 25 MG/1; MG/1
1 TABLET ORAL DAILY
Qty: 90 TABLET | Refills: 1 | Status: SHIPPED | OUTPATIENT
Start: 2025-06-16

## 2025-06-16 RX ORDER — LISINOPRIL AND HYDROCHLOROTHIAZIDE 20; 25 MG/1; MG/1
1 TABLET ORAL DAILY
Qty: 90 TABLET | Refills: 1 | Status: SHIPPED | OUTPATIENT
Start: 2025-06-16 | End: 2025-06-16

## 2025-06-19 DIAGNOSIS — E11.9 TYPE 2 DIABETES MELLITUS WITHOUT COMPLICATION, WITHOUT LONG-TERM CURRENT USE OF INSULIN (HCC): ICD-10-CM

## 2025-06-23 ENCOUNTER — OFFICE VISIT (OUTPATIENT)
Dept: PRIMARY CARE CLINIC | Age: 50
End: 2025-06-23
Payer: COMMERCIAL

## 2025-06-23 VITALS
SYSTOLIC BLOOD PRESSURE: 122 MMHG | WEIGHT: 223 LBS | OXYGEN SATURATION: 97 % | RESPIRATION RATE: 18 BRPM | TEMPERATURE: 97.3 F | HEART RATE: 83 BPM | HEIGHT: 67 IN | BODY MASS INDEX: 35 KG/M2 | DIASTOLIC BLOOD PRESSURE: 78 MMHG

## 2025-06-23 DIAGNOSIS — I10 ESSENTIAL HYPERTENSION: ICD-10-CM

## 2025-06-23 DIAGNOSIS — I10 ESSENTIAL HYPERTENSION: Primary | ICD-10-CM

## 2025-06-23 LAB
ANION GAP SERPL CALCULATED.3IONS-SCNC: 15 MMOL/L (ref 7–16)
BUN BLDV-MCNC: 12 MG/DL (ref 6–20)
CALCIUM SERPL-MCNC: 9.5 MG/DL (ref 8.6–10)
CHLORIDE BLD-SCNC: 101 MMOL/L (ref 98–107)
CO2: 24 MMOL/L (ref 22–29)
CREAT SERPL-MCNC: 0.9 MG/DL (ref 0.5–1)
GFR, ESTIMATED: 76 ML/MIN/1.73M2
GLUCOSE BLD-MCNC: 173 MG/DL (ref 74–99)
POTASSIUM SERPL-SCNC: 3.6 MMOL/L (ref 3.5–5.1)
SODIUM BLD-SCNC: 140 MMOL/L (ref 136–145)

## 2025-06-23 PROCEDURE — 3078F DIAST BP <80 MM HG: CPT | Performed by: FAMILY MEDICINE

## 2025-06-23 PROCEDURE — 3074F SYST BP LT 130 MM HG: CPT | Performed by: FAMILY MEDICINE

## 2025-06-23 PROCEDURE — 99213 OFFICE O/P EST LOW 20 MIN: CPT | Performed by: FAMILY MEDICINE

## 2025-06-23 RX ORDER — DOXYCYCLINE 100 MG/1
CAPSULE ORAL
COMMUNITY
Start: 2025-06-19

## 2025-06-23 ASSESSMENT — ENCOUNTER SYMPTOMS
ORTHOPNEA: 0
EYE ITCHING: 0
APNEA: 0
CHEST TIGHTNESS: 0
BLOOD IN STOOL: 0
SORE THROAT: 0
SHORTNESS OF BREATH: 0
BLURRED VISION: 0
DIARRHEA: 0
WHEEZING: 0
EYE PAIN: 0
EYE REDNESS: 0
SINUS PRESSURE: 0
COLOR CHANGE: 0
ABDOMINAL PAIN: 0
RHINORRHEA: 0
VOMITING: 0
COUGH: 0
NAUSEA: 0
CONSTIPATION: 0
BACK PAIN: 0

## 2025-06-23 NOTE — PROGRESS NOTES
Chief Complaint:     Chief Complaint   Patient presents with    1 Month Follow-Up    Hypertension         Hypertension  This is a chronic problem. The current episode started more than 1 year ago. The problem has been rapidly improving since onset. The problem is controlled. Pertinent negatives include no anxiety, blurred vision, chest pain, headaches, malaise/fatigue, neck pain, orthopnea, palpitations, peripheral edema, PND, shortness of breath or sweats. There are no associated agents to hypertension. There are no known risk factors for coronary artery disease. Past treatments include nothing. There are no compliance problems.  There is no history of angina, kidney disease, CAD/MI, CVA or heart failure.       Patient Active Problem List   Diagnosis    GERD (gastroesophageal reflux disease)    Essential hypertension    Type 2 diabetes mellitus with microalbuminuria, with long-term current use of insulin (HCC)    Familial hyperlipidemia    Irritable bowel syndrome with diarrhea    Dysfunction of both eustachian tubes    DUB (dysfunctional uterine bleeding)    Current mild episode of major depressive disorder without prior episode    Migraine without aura and without status migrainosus, not intractable    Iron deficiency anemia    Multinodular goiter       Past Medical History:   Diagnosis Date    Diabetes mellitus (HCC)     Fatty liver disease, nonalcoholic     GERD (gastroesophageal reflux disease)     Migraine     +Aura    PCOS (polycystic ovarian syndrome)     Post partum depression        Past Surgical History:   Procedure Laterality Date     SECTION      x1    COLONOSCOPY N/A 2024    COLONOSCOPY BIOPSY performed by Pily Whittaker MD at [facility] ENDOSCOPY    DILATION AND CURETTAGE OF UTERUS      SAB    THYROIDECTOMY Left 2024    LEFT HEMITHYROIDECTOMY WITH NERVE INTEGRITY MONITOR performed by Hayder Peace DO at [facility] OR    TONSILLECTOMY      UPPER GASTROINTESTINAL ENDOSCOPY N/A

## 2025-06-24 ENCOUNTER — RESULTS FOLLOW-UP (OUTPATIENT)
Dept: PRIMARY CARE CLINIC | Age: 50
End: 2025-06-24

## 2025-07-24 ENCOUNTER — HOSPITAL ENCOUNTER (OUTPATIENT)
Dept: ULTRASOUND IMAGING | Age: 50
Discharge: HOME | End: 2025-07-26
Payer: COMMERCIAL

## 2025-07-24 DIAGNOSIS — E04.2 MULTIPLE THYROID NODULES: ICD-10-CM

## 2025-07-24 PROCEDURE — 76536 US EXAM OF HEAD AND NECK: CPT

## 2025-09-03 ENCOUNTER — TELEPHONE (OUTPATIENT)
Age: 50
End: 2025-09-03

## 2025-09-04 ENCOUNTER — APPOINTMENT (OUTPATIENT)
Dept: ULTRASOUND IMAGING | Age: 50
End: 2025-09-04
Payer: COMMERCIAL

## 2025-09-04 ENCOUNTER — HOSPITAL ENCOUNTER (EMERGENCY)
Age: 50
Discharge: HOME | End: 2025-09-04
Payer: COMMERCIAL

## 2025-09-04 VITALS
HEIGHT: 67 IN | WEIGHT: 219 LBS | RESPIRATION RATE: 16 BRPM | TEMPERATURE: 98.1 F | HEART RATE: 79 BPM | OXYGEN SATURATION: 100 % | BODY MASS INDEX: 34.37 KG/M2 | DIASTOLIC BLOOD PRESSURE: 84 MMHG | SYSTOLIC BLOOD PRESSURE: 121 MMHG

## 2025-09-04 DIAGNOSIS — R11.2 NAUSEA AND VOMITING, UNSPECIFIED VOMITING TYPE: ICD-10-CM

## 2025-09-04 DIAGNOSIS — K83.8 COMMON BILE DUCT DILATION: Primary | ICD-10-CM

## 2025-09-04 LAB
ALBUMIN SERPL-MCNC: 4.4 G/DL (ref 3.5–5.2)
ALP SERPL-CCNC: 75 U/L (ref 35–104)
ALT SERPL-CCNC: 17 U/L (ref 0–35)
ANION GAP SERPL CALCULATED.3IONS-SCNC: 14 MMOL/L (ref 7–16)
AST SERPL-CCNC: 17 U/L (ref 0–35)
BASOPHILS # BLD: 0.06 K/UL (ref 0–0.2)
BASOPHILS NFR BLD: 1 % (ref 0–2)
BILIRUB DIRECT SERPL-MCNC: 0.2 MG/DL (ref 0–0.2)
BILIRUB INDIRECT SERPL-MCNC: 0.3 MG/DL (ref 0–1)
BILIRUB SERPL-MCNC: 0.5 MG/DL (ref 0–1.2)
BUN SERPL-MCNC: 15 MG/DL (ref 6–20)
CALCIUM SERPL-MCNC: 9.5 MG/DL (ref 8.6–10)
CHLORIDE SERPL-SCNC: 102 MMOL/L (ref 98–107)
CO2 SERPL-SCNC: 22 MMOL/L (ref 22–29)
CREAT SERPL-MCNC: 0.9 MG/DL (ref 0.5–1)
EOSINOPHIL # BLD: 0.14 K/UL (ref 0.05–0.5)
EOSINOPHILS RELATIVE PERCENT: 1 % (ref 0–6)
ERYTHROCYTE [DISTWIDTH] IN BLOOD BY AUTOMATED COUNT: 14 % (ref 11.5–15)
GFR, ESTIMATED: 78 ML/MIN/1.73M2
GLUCOSE SERPL-MCNC: 153 MG/DL (ref 74–99)
HCG UR QL: NEGATIVE
HCT VFR BLD AUTO: 42.8 % (ref 34–48)
HGB BLD-MCNC: 14.2 G/DL (ref 11.5–15.5)
IMM GRANULOCYTES # BLD AUTO: 0.05 K/UL (ref 0–0.58)
IMM GRANULOCYTES NFR BLD: 0 % (ref 0–5)
LYMPHOCYTES NFR BLD: 1.87 K/UL (ref 1.5–4)
LYMPHOCYTES RELATIVE PERCENT: 16 % (ref 20–42)
MCH RBC QN AUTO: 30.9 PG (ref 26–35)
MCHC RBC AUTO-ENTMCNC: 33.2 G/DL (ref 32–34.5)
MCV RBC AUTO: 93 FL (ref 80–99.9)
MONOCYTES NFR BLD: 0.8 K/UL (ref 0.1–0.95)
MONOCYTES NFR BLD: 7 % (ref 2–12)
NEUTROPHILS NFR BLD: 75 % (ref 43–80)
NEUTS SEG NFR BLD: 8.61 K/UL (ref 1.8–7.3)
PLATELET # BLD AUTO: 295 K/UL (ref 130–450)
PMV BLD AUTO: 11.8 FL (ref 7–12)
POTASSIUM SERPL-SCNC: 4.1 MMOL/L (ref 3.5–5.1)
PROT SERPL-MCNC: 7.8 G/DL (ref 6.4–8.3)
RBC # BLD AUTO: 4.6 M/UL (ref 3.5–5.5)
SODIUM SERPL-SCNC: 138 MMOL/L (ref 136–145)
WBC OTHER # BLD: 11.5 K/UL (ref 4.5–11.5)

## 2025-09-04 PROCEDURE — 2580000003 HC RX 258

## 2025-09-04 PROCEDURE — 85025 COMPLETE CBC W/AUTO DIFF WBC: CPT

## 2025-09-04 PROCEDURE — 96374 THER/PROPH/DIAG INJ IV PUSH: CPT

## 2025-09-04 PROCEDURE — 6360000002 HC RX W HCPCS

## 2025-09-04 PROCEDURE — 99284 EMERGENCY DEPT VISIT MOD MDM: CPT

## 2025-09-04 PROCEDURE — 96376 TX/PRO/DX INJ SAME DRUG ADON: CPT

## 2025-09-04 PROCEDURE — 76705 ECHO EXAM OF ABDOMEN: CPT

## 2025-09-04 PROCEDURE — 96375 TX/PRO/DX INJ NEW DRUG ADDON: CPT

## 2025-09-04 PROCEDURE — 84703 CHORIONIC GONADOTROPIN ASSAY: CPT

## 2025-09-04 PROCEDURE — 80053 COMPREHEN METABOLIC PANEL: CPT

## 2025-09-04 PROCEDURE — 82248 BILIRUBIN DIRECT: CPT

## 2025-09-04 RX ORDER — 0.9 % SODIUM CHLORIDE 0.9 %
1000 INTRAVENOUS SOLUTION INTRAVENOUS ONCE
Status: COMPLETED | OUTPATIENT
Start: 2025-09-04 | End: 2025-09-04

## 2025-09-04 RX ORDER — MORPHINE SULFATE 4 MG/ML
4 INJECTION, SOLUTION INTRAMUSCULAR; INTRAVENOUS ONCE
Refills: 0 | Status: COMPLETED | OUTPATIENT
Start: 2025-09-04 | End: 2025-09-04

## 2025-09-04 RX ORDER — ONDANSETRON 4 MG/1
4 TABLET, ORALLY DISINTEGRATING ORAL 3 TIMES DAILY PRN
Qty: 21 TABLET | Refills: 0 | Status: SHIPPED | OUTPATIENT
Start: 2025-09-04

## 2025-09-04 RX ORDER — ONDANSETRON 2 MG/ML
4 INJECTION INTRAMUSCULAR; INTRAVENOUS ONCE
Status: COMPLETED | OUTPATIENT
Start: 2025-09-04 | End: 2025-09-04

## 2025-09-04 RX ORDER — HYDROCODONE BITARTRATE AND ACETAMINOPHEN 5; 325 MG/1; MG/1
1 TABLET ORAL EVERY 4 HOURS PRN
Qty: 12 TABLET | Refills: 0 | Status: SHIPPED | OUTPATIENT
Start: 2025-09-04 | End: 2025-09-07

## 2025-09-04 RX ADMIN — ONDANSETRON 4 MG: 2 INJECTION, SOLUTION INTRAMUSCULAR; INTRAVENOUS at 08:34

## 2025-09-04 RX ADMIN — SODIUM CHLORIDE 1000 ML: 0.9 INJECTION, SOLUTION INTRAVENOUS at 08:33

## 2025-09-04 RX ADMIN — MORPHINE SULFATE 4 MG: 4 INJECTION, SOLUTION INTRAMUSCULAR; INTRAVENOUS at 08:34

## 2025-09-04 RX ADMIN — MORPHINE SULFATE 4 MG: 4 INJECTION, SOLUTION INTRAMUSCULAR; INTRAVENOUS at 11:51

## 2025-09-04 ASSESSMENT — PAIN SCALES - GENERAL
PAINLEVEL_OUTOF10: 9
PAINLEVEL_OUTOF10: 5
PAINLEVEL_OUTOF10: 8
PAINLEVEL_OUTOF10: 7

## 2025-09-04 ASSESSMENT — PAIN DESCRIPTION - LOCATION
LOCATION: ABDOMEN

## 2025-09-04 ASSESSMENT — PAIN - FUNCTIONAL ASSESSMENT
PAIN_FUNCTIONAL_ASSESSMENT: 0-10

## 2025-09-04 ASSESSMENT — PAIN DESCRIPTION - ORIENTATION: ORIENTATION: RIGHT

## 2025-09-04 ASSESSMENT — LIFESTYLE VARIABLES
HOW OFTEN DO YOU HAVE A DRINK CONTAINING ALCOHOL: NEVER
HOW MANY STANDARD DRINKS CONTAINING ALCOHOL DO YOU HAVE ON A TYPICAL DAY: PATIENT DOES NOT DRINK

## 2025-09-04 ASSESSMENT — PAIN DESCRIPTION - DESCRIPTORS
DESCRIPTORS: CRAMPING;DULL
DESCRIPTORS: DISCOMFORT

## (undated) DEVICE — BLOCK BITE 60FR RUBBER ADLT DENTAL

## (undated) DEVICE — GLOVE ORTHO 7   MSG9470

## (undated) DEVICE — DOUBLE BASIN SET: Brand: MEDLINE INDUSTRIES, INC.

## (undated) DEVICE — ELECTRODE PT RET AD L9FT HI MOIST COND ADH HYDRGEL CORDED

## (undated) DEVICE — 4-PORT MANIFOLD: Brand: NEPTUNE 2

## (undated) DEVICE — SKN PREP SPNG STKS PVP PNT STR: Brand: MEDLINE INDUSTRIES, INC.

## (undated) DEVICE — TOWEL,OR,DSP,ST,BLUE,STD,6/PK,12PK/CS: Brand: MEDLINE

## (undated) DEVICE — AGENT HEMSTAT 3GM PURIFIED PLNT STARCH PWD ABSRB ARISTA AH

## (undated) DEVICE — CORD,CAUTERY,BIPOLAR,STERILE: Brand: MEDLINE

## (undated) DEVICE — SPONGE,PEANUT,XRAY,ST,SM,3/8",5/CARD: Brand: MEDLINE INDUSTRIES, INC.

## (undated) DEVICE — SURGICAL PROCEDURE PACK EENT CUST

## (undated) DEVICE — LIQUIBAND RAPID ADHESIVE 36/CS 0.8ML: Brand: MEDLINE

## (undated) DEVICE — BLADE,STAINLESS-STEEL,15,STRL,DISPOSABLE: Brand: MEDLINE

## (undated) DEVICE — MASTISOL ADHESIVE LIQ 2/3ML

## (undated) DEVICE — MAGNETIC INSTR DRAPE 20X16: Brand: MEDLINE INDUSTRIES, INC.

## (undated) DEVICE — STRIP,CLOSURE,WOUND,MEDI-STRIP,1/2X4: Brand: MEDLINE

## (undated) DEVICE — NEEDLE HYPO 25GA L1.5IN BLU POLYPR HUB S STL REG BVL STR

## (undated) DEVICE — PACK PROCEDURE SURG GEN CUST

## (undated) DEVICE — DISSECTOR ENDOSCP L21CM TIP CURVATURE 40DEG FN CRV JAW VES

## (undated) DEVICE — SYRINGE,CONTROL,LL,FINGER,GRIP: Brand: MEDLINE INDUSTRIES, INC.

## (undated) DEVICE — SPONGE GZ W4XL4IN RAYON POLY CVR W/NONWOVEN FAB STRL 2/PK

## (undated) DEVICE — GRADUATE TRIANG MEASURE 1000ML BLK PRNT

## (undated) DEVICE — SHEARS ENDOSCP L9CM CRV HARM FOCS +

## (undated) DEVICE — PROBE 8225101 5PK STD PRASS FL TIP ROHS

## (undated) DEVICE — FORCEPS BX OVL CUP FEN DISPOSABLE CAP L 160CM RAD JAW 4

## (undated) DEVICE — BLADE ES ELASTOMERIC COAT INSUL DURABLE BEND UPTO 90DEG

## (undated) DEVICE — EMG TUBE 8229708 NIM TRIVANTAGE 8.0MM ID: Brand: NIM TRIVANTAGE™